# Patient Record
Sex: FEMALE | Race: BLACK OR AFRICAN AMERICAN | Employment: FULL TIME | URBAN - METROPOLITAN AREA
[De-identification: names, ages, dates, MRNs, and addresses within clinical notes are randomized per-mention and may not be internally consistent; named-entity substitution may affect disease eponyms.]

---

## 2017-01-10 ENCOUNTER — MYC MEDICAL ADVICE (OUTPATIENT)
Dept: FAMILY MEDICINE | Facility: CLINIC | Age: 37
End: 2017-01-10

## 2017-01-10 DIAGNOSIS — I26.99 OTHER ACUTE PULMONARY EMBOLISM WITHOUT ACUTE COR PULMONALE (H): Primary | ICD-10-CM

## 2017-01-10 ASSESSMENT — ENCOUNTER SYMPTOMS
COUGH DISTURBING SLEEP: 0
DYSPNEA ON EXERTION: 1
RESPIRATORY PAIN: 0
SMELL DISTURBANCE: 0
COUGH: 0
BRUISES/BLEEDS EASILY: 1
TROUBLE SWALLOWING: 0
HOARSE VOICE: 0
SORE THROAT: 0
SWOLLEN GLANDS: 0
TASTE DISTURBANCE: 0
HOT FLASHES: 0
SHORTNESS OF BREATH: 1
SINUS CONGESTION: 0
WHEEZING: 0
SPUTUM PRODUCTION: 0
NECK MASS: 0
SINUS PAIN: 0
SNORES LOUDLY: 0
POSTURAL DYSPNEA: 0
HEMOPTYSIS: 0
DECREASED LIBIDO: 0

## 2017-01-10 ASSESSMENT — ANXIETY QUESTIONNAIRES
GAD7 TOTAL SCORE: 0
7. FEELING AFRAID AS IF SOMETHING AWFUL MIGHT HAPPEN: 0 = NOT AT ALL
GAD7 TOTAL SCORE: 0

## 2017-01-10 ASSESSMENT — PATIENT HEALTH QUESTIONNAIRE - PHQ9
10. IF YOU CHECKED OFF ANY PROBLEMS, HOW DIFFICULT HAVE THESE PROBLEMS MADE IT FOR YOU TO DO YOUR WORK, TAKE CARE OF THINGS AT HOME, OR GET ALONG WITH OTHER PEOPLE: SOMEWHAT DIFFICULT
SUM OF ALL RESPONSES TO PHQ QUESTIONS 1-9: 1

## 2017-01-11 ASSESSMENT — PATIENT HEALTH QUESTIONNAIRE - PHQ9: SUM OF ALL RESPONSES TO PHQ QUESTIONS 1-9: 1

## 2017-01-11 ASSESSMENT — ANXIETY QUESTIONNAIRES: GAD7 TOTAL SCORE: 0

## 2017-01-16 ENCOUNTER — OFFICE VISIT (OUTPATIENT)
Dept: HEMATOLOGY | Facility: CLINIC | Age: 37
End: 2017-01-16
Attending: INTERNAL MEDICINE
Payer: COMMERCIAL

## 2017-01-16 ENCOUNTER — OFFICE VISIT (OUTPATIENT)
Dept: HEMATOLOGY | Facility: CLINIC | Age: 37
End: 2017-01-16
Attending: GENETIC COUNSELOR, MS
Payer: COMMERCIAL

## 2017-01-16 VITALS
WEIGHT: 154.5 LBS | TEMPERATURE: 98.3 F | BODY MASS INDEX: 28.43 KG/M2 | HEART RATE: 87 BPM | DIASTOLIC BLOOD PRESSURE: 74 MMHG | HEIGHT: 62 IN | SYSTOLIC BLOOD PRESSURE: 131 MMHG

## 2017-01-16 DIAGNOSIS — Z86.711 HISTORY OF PULMONARY EMBOLISM: Primary | ICD-10-CM

## 2017-01-16 DIAGNOSIS — I26.99 OTHER ACUTE PULMONARY EMBOLISM WITHOUT ACUTE COR PULMONALE (H): Primary | ICD-10-CM

## 2017-01-16 PROCEDURE — 99213 OFFICE O/P EST LOW 20 MIN: CPT

## 2017-01-16 PROCEDURE — 99204 OFFICE O/P NEW MOD 45 MIN: CPT | Mod: GC | Performed by: INTERNAL MEDICINE

## 2017-01-16 ASSESSMENT — PAIN SCALES - GENERAL: PAINLEVEL: NO PAIN (0)

## 2017-01-16 NOTE — PROGRESS NOTES
2017  Presenting Information: Millicent Celaya was seen for an initial evaluation at [facility]. I met with her per the request of Dr. Bhavin Keita to obtain a family history.  Personal History:   Millicent is a 36 year old woman with a history of pulmonary embolism in . She sustained a leg injury after being chased by a dog in March the same year. She was on oral contraceptives at the time of her clot, but had been on them for about 8 years. Please see Dr. Keita's note for additional details regarding her personal history.   Family History: A three generation pedigree, specific to clotting was obtained today and scanned into the EMR. The following information is significant:     One maternal half brother, age 42, no clotting.    Mother, age 65, no history of clotting. She had used birth control pills in the past.    Maternal uncle who  from cancer in his 40s.  Millicent believes this may have been pancreatic cancer, but cannot remember exactly.      No clotting in any of her three livings maternal uncles.    Maternal grandmother is 88 and has not had clotting.    Maternal grandfather  at 80 of a heart attack.    Father  at 53 from complications of juvenile diabetes.     Paternal grandfather  of a heart attack in his 80s.     Paternal grandmother  in her 70s of a stroke.     The family history is otherwise negative for clotting.    Maternal ancestry is .  Paternal ancestry is .  There is no reported consanguinity.  Plan:   1) A three generation pedigree was obtained and scanned into the EMR.  2) She will meet with Dr. Keita today  3) Contact information was provided to Millicent and additional questions or concerns were denied.    Approximate Time Spent in Consultation: 10 minutes.   CC: No Letter   Cydney Hurt MS, [facility] [place]  Certified Genetic Counselor  P. [phone]  F. [phone]

## 2017-01-16 NOTE — MR AVS SNAPSHOT
"              After Visit Summary   1/16/2017    Millicent Celaya    MRN: [id number]           Patient Information     Date Of Birth          1980        Visit Information        Provider Department      1/16/2017 1:30 PM Bhavin Keita MD [facility]        Care Instructions    1.  Stay on Xarelto 20 mg daily long-term.  2.  See Dr. Keita back in 6 months.        Follow-ups after your visit        Who to contact     If you have questions or need follow up information about today's clinic visit or your schedule please contact [facility] directly at [phone].  Normal or non-critical lab and imaging results will be communicated to you by Kliquehart, letter or phone within 4 business days after the clinic has received the results. If you do not hear from us within 7 days, please contact the clinic through [employer] or phone. If you have a critical or abnormal lab result, we will notify you by phone as soon as possible.  Submit refill requests through [employer] or call your pharmacy and they will forward the refill request to us. Please allow 3 business days for your refill to be completed.          Additional Information About Your Visit        MyChart Information     [employer] gives you secure access to your electronic health record. If you see a primary care provider, you can also send messages to your care team and make appointments. If you have questions, please call your primary care clinic.  If you do not have a primary care provider, please call [phone] and they will assist you.        Care EveryWhere ID     This is your Care EveryWhere ID. This could be used by other organizations to access your [place] medical records  [id number]        Your Vitals Were     Pulse Temperature Height BMI (Body Mass Index)          87 98.3  F (36.8  C) (Oral) 1.575 m (5' 2\") 28.25 kg/m2         Blood Pressure from Last 3 Encounters:   01/16/17 131/74   12/13/16 112/68 "   11/18/16 108/66    Weight from Last 3 Encounters:   01/16/17 70.081 kg (154 lb 8 oz)   12/13/16 69.4 kg (153 lb)   11/18/16 69.763 kg (153 lb 12.8 oz)              Today, you had the following     No orders found for display       Primary Care Provider    None Specified       No primary provider on file.        Thank you!     Thank you for choosing [facility]  for your care. Our goal is always to provide you with excellent care. Hearing back from our patients is one way we can continue to improve our services. Please take a few minutes to complete the written survey that you may receive in the mail after your visit with us. Thank you!             Your Updated Medication List - Protect others around you: Learn how to safely use, store and throw away your medicines at www.disposemymeds.org.          This list is accurate as of: 1/16/17  2:59 PM.  Always use your most recent med list.                   Brand Name Dispense Instructions for use    rivaroxaban ANTICOAGULANT 20 MG Tabs tablet    XARELTO    30 tablet    Take 1 tablet (20 mg) by mouth daily (with dinner)

## 2017-01-16 NOTE — NURSING NOTE
"Chief Complaint   Patient presents with     Consult     consult with porsha MILES cma       Initial /74 mmHg  Pulse 87  Temp(Src) 98.3  F (36.8  C) (Oral)  Ht 1.575 m (5' 2\")  Wt 70.081 kg (154 lb 8 oz)  BMI 28.25 kg/m2 Estimated body mass index is 28.25 kg/(m^2) as calculated from the following:    Height as of this encounter: 1.575 m (5' 2\").    Weight as of this encounter: 70.081 kg (154 lb 8 oz).  BP completed using cuff size: regular    "

## 2017-01-16 NOTE — PROGRESS NOTES
[facility]  Outpatient Clinic Visit      Date: 01/16/2017     Reason for Consult: Pulmonary Embolism    HISTORY OF PRESENT ILLNESS: Ms. Celaya is a 36 year old female with no significant past medical history who had an episode of VTE in July 2016.  She states that she had a tear in her right calf muscle in March of 2016. She had been undergoing physical therapy and rehab for it and it had largely resolved by June 2016. In addition, she had been on birth control pills for 8 years prior due to heavy periods from endometriosis. On 07/23/2016 she woke up around 2AM with chest pain, she went back to bed, but when she got up again at 7AM the pain continued and progressively got worse throughout the day until she called an ambulance and went to the ED. She had an elevated D-dimer and was found to have bilateral pulmonary emboli and was admitted to initiate anticoagulation. She was switched to rivaroxiban and discharged home. She does not believe she had an ultrasound of her legs at that time.  The oral estrogen was stopped.    She has been on the rivaroxiban now for about 6 months and has tolerated it fairly well with the exception of significantly heavier menstrual periods that are quite bothersome to her. They last much longer and are heavier likely due to the combination of anticoagulation and being off estrogen. She also has occasional bruises but this is nothing new for her. She otherwise has no blood in the urine, stool, nose bleeds, or other bleeding problems.    She has noted that she has continued to have shortness of breath, particularly if she climbs stairs, she has noticed this for the last several months. She has pushed her self to do more in terms of climbing more flights, but she has to take this slower than normal. She has been seeing a pulmonologist through [facility] and states she was found to have abnormal PFTs (records not available) and there are plans to work up  "further for possible pulmonary hypertension.    A complete ROS is otherwise negative.    PMH: endometriosis    PSH: salpingectomy    FMH: no family history of bleeding or clotting disorders    SocHx: Lifetime nonsmoker, rare alcohol use, no other drug use. 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      No Known Allergies    PHYSICAL EXAM:  /74 mmHg  Pulse 87  Temp(Src) 98.3  F (36.8  C) (Oral)  Ht 1.575 m (5' 2\")  Wt 70.081 kg (154 lb 8 oz)  BMI 28.25 kg/m2  Wt Readings from Last 3 Encounters:   01/16/17 70.081 kg (154 lb 8 oz)   12/13/16 69.4 kg (153 lb)   11/18/16 69.763 kg (153 lb 12.8 oz)   Gen: well appearing female in no acute distress  HEENT: anicteric sclera, oropharynx normal  Lungs: CTA B/L, no wheezing  Heart: RRR, no murmur  Abd: soft, nontender, nondistended  Ext: no lower extremity edema  Neuro: CN II-XII grossly intact      ASSESSMENT: Ms. Celaya is a 36 year old female who presents to discuss long term management of her pulmonary embolism. I reviewed with her the biology and natural history of venous thromboembolic disease. I reviewed with her the difference between provoked and unprovoked clots. Her situation is challenging; though she has potential risk factors with her musculoskeletal injury and oral estrogen, neither of these are clear reasons for her blood clot. She had the calf injury 4 months before her clot and she had been on estrogen stably for several years. Therefore, we cannot with certainty call this a provoked clot.  I did discuss that at baseline, given her history of a blood clot, she would be at increased risk of developing another blood clot compared to the general population.  The other concern is that of her pulmonary symptoms, she has only had PFTs but she states that the pulmonologist,  " Royce, plans to also do an echocardiogram at some point in the future to further work this up. Her primary complaint from the Rivaroxiban is her heavy periods.  There has been talk of possible hysterectomy or an IUD. We did review that she will need to avoid estrogen containing products in the future.     At this point, given that she does not have a clear provoking factor I would recommend that she continue on anticoagulation at this time. She understands this. I think a priority is to get her menorrhagia under control, this would certainly improve her quality of life as it is the primary side effect she is having from the anticoagulation. From our stand point and IUD such as Mirena would be appropriate. This contains progestin and should not increase her risk of developing a blood clot. In addition, she could consider endometrial ablation or hysterectomy if deemed appropriate by her gynecologist. We did indicate to her that ibuprofen intermittently should not cause significant problems and if she does develop a headache it would be okay to take this. If she has regular use she should inform us to discuss other options.    PLAN:  - Continue Rivaroxiban 20 mg daily  - She will discuss with gynecology regarding IUD vs. Endometrial ablation  - RTC in 6 months for ongoing risk/benefit assessment of anticoagulation.    Patient seen and discussed with Dr. Keita.    Emmanuel Santamaria  Hematology/Oncology Fellow        HEMATOLOGY STAFF:  Seen with fellow, whose note reflects our joint evaluation, assessment, and plan.  Healthy 37 yo woman with no previous personal or family history of VTE.  Had bilateral PE in July 2016.  Had been on oral estrogen for several years and injured her leg 4 months prior.  We cannot confidently call this a clearly provoked clot -- injury was more than 12 weeks prior, and had been on estrogen much longer than we would expect for that as a primary provoking factor.  At this point, would strongly  favor long term anticoagulation, and she is accepting of this.  Work-up for genetic (and acquired) thrombophilia likely to be unrevealing given absent FH, and results would not alter our recommendations.  Doing well on rivaroxaban, except for heavy menstrual bleeding.  Recommended that she discuss options with GYN -- Mirena IUD or endometrial ablation both appropriate options from our perspective.  RTC 6 months to re-evaluate.    Bhavin Keita MD  Associate Professor of Medicine  Division of Hematology, Oncology, and Transplantation  Director, [facility]

## 2017-01-17 NOTE — NURSING NOTE
Millicent Celaya  MRN:  [id number]  Female, 36 year old, 1980    Teaching Flowsheet   Relevant Diagnosis: Unprovoked PE 7/23/16; taking Xarelto    Teaching Topic: High risk times for venous clots, Unprovoked vs provoked clots & duration of anticoagulation, Xarelto    Plan:  Continue on Xarelto & see Dr. Neela harvey in 6 months.     Person(s) involved in teaching:   Patient     Motivation Level: good  Asks Questions: Yes      Eager to Learn: Yes  Cooperative: Yes    Receptive (willing/able to accept information): Yes     Patient demonstrates understanding of the following:  Reason for the appointment, diagnosis and treatment plan: Yes  Knowledge of proper use of medications and conditions for which they are ordered (with special attention to potential side effects or drug interactions): Yes  Which situations necessitate calling provider and whom to contact: Yes      Nutritional needs and diet plan: NA  Pain management techniques: NA  Wound Care: NA  How and/when to access community resources: NA    Educated patient about the signs, symptoms of and risk factors for venous thrombosis (VTE) and provided the National Blood Clot [place] book francisco, which reviews these facts.    Patient educated about benefits and potential complications of anticoagulation.       Patient verbalized understanding of the above information.  Reviewed and discussed the patient instructions point by point and patient verbalized understanding.They deny further questions at this time.    Copy of the After Visit Summary (AVS) given to patient for future reference. Patient given the contact card for the [facility] and has the appropriate numbers to call with any questions.    Gladis Adrian, RN, MSN -Nurse Clinician, [facility]

## 2017-02-13 ENCOUNTER — OFFICE VISIT (OUTPATIENT)
Dept: HEMATOLOGY | Facility: CLINIC | Age: 37
End: 2017-02-13
Attending: INTERNAL MEDICINE
Payer: COMMERCIAL

## 2017-02-13 VITALS
SYSTOLIC BLOOD PRESSURE: 143 MMHG | DIASTOLIC BLOOD PRESSURE: 81 MMHG | TEMPERATURE: 98 F | HEART RATE: 99 BPM | BODY MASS INDEX: 28.95 KG/M2 | WEIGHT: 157.3 LBS | HEIGHT: 62 IN

## 2017-02-13 DIAGNOSIS — Z86.711 HISTORY OF PULMONARY EMBOLISM: Primary | ICD-10-CM

## 2017-02-13 PROCEDURE — 99213 OFFICE O/P EST LOW 20 MIN: CPT | Mod: GC | Performed by: INTERNAL MEDICINE

## 2017-02-13 PROCEDURE — 99213 OFFICE O/P EST LOW 20 MIN: CPT

## 2017-02-13 ASSESSMENT — ENCOUNTER SYMPTOMS
DECREASED LIBIDO: 0
JAUNDICE: 0
DIARRHEA: 0
NAUSEA: 0
POLYDIPSIA: 0
BLOOD IN STOOL: 0
INCREASED ENERGY: 1
BRUISES/BLEEDS EASILY: 0
SWOLLEN GLANDS: 0
BOWEL INCONTINENCE: 0
CONSTIPATION: 0
WEIGHT GAIN: 0
VOMITING: 0
NIGHT SWEATS: 0
DECREASED APPETITE: 0
ALTERED TEMPERATURE REGULATION: 1
POLYPHAGIA: 0
HEARTBURN: 0
RECTAL BLEEDING: 0
FATIGUE: 1
ABDOMINAL PAIN: 0
BLOATING: 0
RECTAL PAIN: 0
HALLUCINATIONS: 0
CHILLS: 0
FEVER: 0
WEIGHT LOSS: 0
HOT FLASHES: 0

## 2017-02-13 ASSESSMENT — PAIN SCALES - GENERAL: PAINLEVEL: NO PAIN (0)

## 2017-02-13 NOTE — MR AVS SNAPSHOT
"              After Visit Summary   2/13/2017    Millicent Celaya    MRN: [id number]           Patient Information     Date Of Birth          1980        Visit Information        Provider Department      2/13/2017 1:30 PM Bhavin Keita MD [facility] Clot[facility]        Today's Diagnoses     History of pulmonary embolism    -  1       Follow-ups after your visit        Who to contact     If you have questions or need follow up information about today's clinic visit or your schedule please contact [facility] directly at [phone].  Normal or non-critical lab and imaging results will be communicated to you by Ocarina Networkshart, letter or phone within 4 business days after the clinic has received the results. If you do not hear from us within 7 days, please contact the clinic through [employer]finance [employer] or phone. If you have a critical or abnormal lab result, we will notify you by phone as soon as possible.  Submit refill requests through [employer] or call your pharmacy and they will forward the refill request to us. Please allow 3 business days for your refill to be completed.          Additional Information About Your Visit        MyChart Information     [employer] gives you secure access to your electronic health record. If you see a primary care provider, you can also send messages to your care team and make appointments. If you have questions, please call your primary care clinic.  If you do not have a primary care provider, please call [phone] and they will assist you.        Care EveryWhere ID     This is your Care EveryWhere ID. This could be used by other organizations to access your [place] medical records  [id number]        Your Vitals Were     Pulse Temperature Height BMI (Body Mass Index)          99 98  F (36.7  C) (Oral) 1.575 m (5' 2\") 28.77 kg/m2         Blood Pressure from Last 3 Encounters:   02/13/17 143/81   01/16/17 131/74   12/13/16 112/68    Weight from Last 3 Encounters: "   02/13/17 71.4 kg (157 lb 4.8 oz)   01/16/17 70.1 kg (154 lb 8 oz)   12/13/16 69.4 kg (153 lb)              Today, you had the following     No orders found for display       Primary Care Provider    None Specified       No primary provider on file.        Thank you!     Thank you for choosing [facility]  for your care. Our goal is always to provide you with excellent care. Hearing back from our patients is one way we can continue to improve our services. Please take a few minutes to complete the written survey that you may receive in the mail after your visit with us. Thank you!             Your Updated Medication List - Protect others around you: Learn how to safely use, store and throw away your medicines at www.disposemymeds.org.          This list is accurate as of: 2/13/17 11:59 PM.  Always use your most recent med list.                   Brand Name Dispense Instructions for use    rivaroxaban ANTICOAGULANT 20 MG Tabs tablet    XARELTO    30 tablet    Take 1 tablet (20 mg) by mouth daily (with dinner)

## 2017-02-13 NOTE — PROGRESS NOTES
"[facility]  Outpatient Clinic Visit    Date: 02/13/2017     Reason for Visit: Follow for DVT.    Interval History: Ms. Celaya is a 36 year old female with a history of a DVT. See note from 01/16/2017 for further details but briefly she had an injury to her calf. Since then she has had some recollections of her prior history. She says the actual date of her injury was June 23, 2016 and not in March as she had previously thought, with the subsequent diagnosis of PE in July 2016 approximately 4 weeks after (not 4 months as previously thought).    She was seen in the ED on 01/23/2016 due to abdominal pain from a known ovarian cyst. She was noted to have a hemoglobin of 6.9 with no signs of  bleeding other than menorrhagia. This was treated with a blood transfusion and Hgb responded to 8.0. She feels significantly better since. She has been off her Xarelto since that time and does not want to get back on it. While there, repeat CT imaging and and ultrasound of her legs showed resolution of clots.    She has had no other major issues and no other bleeding while she had been on the Xarelto.     Complete ROS is otherwise negative.    PHYSICAL EXAM:  /81  Pulse 99  Temp 98  F (36.7  C) (Oral)  Ht 1.575 m (5' 2\")  Wt 71.4 kg (157 lb 4.8 oz)  BMI 28.77 kg/m2  Wt Readings from Last 3 Encounters:   02/13/17 71.4 kg (157 lb 4.8 oz)   01/16/17 70.1 kg (154 lb 8 oz)   12/13/16 69.4 kg (153 lb)   Gen: well appearing female in no acute distress  HEENT: anicteric sclera, oropharynx normal  Lungs: CTA B/L, no wheezing  Heart: RRR, no murmur  Abd: soft, nontender, nondistended  Ext: no lower extremity edema  Neuro: CN II-XII grossly intact    ASSESSMENT: Ms. Celaya is a 36 year old female with a history of a DVT.  In light of the new information she provided it makes her case of a blood clot a little more convincing for a provoked cause. She was only 4 weeks out from her injury rather than 4 " months as she had originally thought. I discussed with her in detail that given this is a provoked clot we would be more comfortable with her stopping her anticoagulation. I did review that given that she has had one clot with a somewhat minor injury, she is certainly at increased risk of clotting over the general population and if she were to have surgeries, trauma, or prolonged immobility she should inform the clinic so appropriate plans can be made for adequate prophylaxis for these situations.    She is also likely iron deficient in the setting of her menorrhagia. Her MCV decreased from 90s to 70 over the last six months. She is following with her PCP for this and has elected to increase her iron intake rather than take iron supplements. She will have repeat labs at her PCP.     PLAN:  - Okay to stay off of Xarelto  - Contact the clinic in the future if she has any surgery, procedure, trauma, or prolonged immobility in order to adequately plan prophylaxis.     Patient seen and discussed with Dr. Keita.    Emmanuel Santamaria  Hematology/Oncology Fellow      HEMATOLOGY STAFF:  Seen with fellow, whose note reflects our joint evaluation, assessment, and plan.      Bhavin Keita MD  Associate Professor of Medicine  Division of Hematology, Oncology, and Transplantation  Director, [facility]

## 2017-02-13 NOTE — NURSING NOTE
"Chief Complaint   Patient presents with     RECHECK     follow up with clotting disorder, tzimmer cma       Initial /81  Pulse 99  Temp 98  F (36.7  C) (Oral)  Ht 1.575 m (5' 2\")  Wt 71.4 kg (157 lb 4.8 oz)  BMI 28.77 kg/m2 Estimated body mass index is 28.77 kg/(m^2) as calculated from the following:    Height as of this encounter: 1.575 m (5' 2\").    Weight as of this encounter: 71.4 kg (157 lb 4.8 oz).  Medication Reconciliation: complete    "

## 2017-07-14 ENCOUNTER — OFFICE VISIT (OUTPATIENT)
Dept: FAMILY MEDICINE | Facility: CLINIC | Age: 37
End: 2017-07-14
Payer: COMMERCIAL

## 2017-07-14 VITALS
WEIGHT: 149 LBS | BODY MASS INDEX: 27.42 KG/M2 | SYSTOLIC BLOOD PRESSURE: 136 MMHG | DIASTOLIC BLOOD PRESSURE: 88 MMHG | TEMPERATURE: 98.4 F | HEART RATE: 78 BPM | RESPIRATION RATE: 16 BRPM | HEIGHT: 62 IN | OXYGEN SATURATION: 100 %

## 2017-07-14 DIAGNOSIS — L70.0 CYSTIC ACNE: Primary | ICD-10-CM

## 2017-07-14 PROBLEM — D50.9 IRON DEFICIENCY ANEMIA: Status: ACTIVE | Noted: 2017-01-27

## 2017-07-14 PROCEDURE — 99214 OFFICE O/P EST MOD 30 MIN: CPT | Performed by: FAMILY MEDICINE

## 2017-07-14 RX ORDER — SPIRONOLACTONE 25 MG/1
25 TABLET ORAL DAILY
Qty: 60 TABLET | Refills: 1 | Status: SHIPPED | OUTPATIENT
Start: 2017-07-14 | End: 2017-07-14

## 2017-07-14 RX ORDER — CLINDAMYCIN PHOSPHATE, BENZOYL PEROXIDE 25; 10 MG/G; MG/G
GEL TOPICAL DAILY
Qty: 50 G | Refills: 1 | Status: SHIPPED | OUTPATIENT
Start: 2017-07-14 | End: 2017-07-14

## 2017-07-14 RX ORDER — CLINDAMYCIN PHOSPHATE, BENZOYL PEROXIDE 25; 10 MG/G; MG/G
GEL TOPICAL DAILY
Qty: 50 G | Refills: 1 | Status: SHIPPED | OUTPATIENT
Start: 2017-07-14 | End: 2017-12-12

## 2017-07-14 RX ORDER — SPIRONOLACTONE 25 MG/1
25 TABLET ORAL DAILY
Qty: 60 TABLET | Refills: 1 | Status: SHIPPED | OUTPATIENT
Start: 2017-07-14 | End: 2017-08-15

## 2017-07-14 NOTE — MR AVS SNAPSHOT
After Visit Summary   7/14/2017    Millicent Celaya    MRN: [id number]           Patient Information     Date Of Birth          1980        Visit Information        Provider Department      7/14/2017 2:20 PM Francesca Padron MD [facility]        Today's Diagnoses     Cystic acne    -  1      Care Instructions    1) Start spironolactone once a day.  2) If you don't see an improvement in 1 month, can increase to 1 in the morning and 1 at night.  3) If no improvement then, see a dermatologist!  [facility] -[phone]  Dr. Sofi Lima    4) For spot treatment, use clindagel that I sent.  5) Continue acne.org face wash regimen.  6) NO POTASSIUM SUPPLEMENTS while on spironolactone.              Follow-ups after your visit        Who to contact     If you have questions or need follow up information about today's clinic visit or your schedule please contact [facility] directly at [phone].  Normal or non-critical lab and imaging results will be communicated to you by datapinehart, letter or phone within 4 business days after the clinic has received the results. If you do not hear from us within 7 days, please contact the clinic through Discoverly[employer] or phone. If you have a critical or abnormal lab result, we will notify you by phone as soon as possible.  Submit refill requests through [employer] [facility] [employer] [facility] or call your pharmacy and they will forward the refill request to us. Please allow 3 business days for your refill to be completed.          Additional Information About Your Visit        MyChart Information     [facility] [employer] [facility] gives you secure access to your electronic health record. If you see a primary care provider, you can also send messages to your care team and make appointments. If you have questions, please call your primary care clinic.  If you do not have a primary care provider, please call  "[phone] and they will assist you.        Care EveryWhere ID     This is your Care EveryWhere ID. This could be used by other organizations to access your [place] medical records  [id number]        Your Vitals Were     Pulse Temperature Respirations Height Last Period Pulse Oximetry    78 98.4  F (36.9  C) (Tympanic) 16 5' 2\" (1.575 m) 06/16/2017 (Approximate) 100%    BMI (Body Mass Index)                   27.25 kg/m2            Blood Pressure from Last 3 Encounters:   07/14/17 136/88   02/13/17 143/81   01/16/17 131/74    Weight from Last 3 Encounters:   07/14/17 149 lb (67.6 kg)   02/13/17 157 lb 4.8 oz (71.4 kg)   01/16/17 154 lb 8 oz (70.1 kg)              Today, you had the following     No orders found for display         Today's Medication Changes          These changes are accurate as of: 7/14/17  3:08 PM.  If you have any questions, ask your nurse or doctor.               Start taking these medicines.        Dose/Directions    Clindamycin Phos-benzoyl Perox 1.2-2.5 % Gel   Used for:  Cystic acne   Started by:  Francesca Padron MD        Externally apply topically daily   Quantity:  50 g   Refills:  1       spironolactone 25 MG tablet   Commonly known as:  ALDACTONE   Used for:  Cystic acne   Started by:  Francesca Padron MD        Dose:  25 mg   Take 1 tablet (25 mg) by mouth daily .  If no better in 1 month, increase to  25 mg BID.   Quantity:  60 tablet   Refills:  1         Stop taking these medicines if you haven't already. Please contact your care team if you have questions.     rivaroxaban ANTICOAGULANT 20 MG Tabs tablet   Commonly known as:  XARELTO   Stopped by:  Francesca Padron MD                Where to get your medicines      These medications were sent to [facility] [address] [place] [address] AT [address] [place] [id number]     Phone:  [phone]     Clindamycin Phos-benzoyl Perox 1.2-2.5 % Gel    " spironolactone 25 MG tablet                Primary Care Provider    None Specified       No primary provider on file.        Equal Access to Services     PRAMOD GUEVARA : Hadii aad ku hadjasonchaitanya Nehamelany, wabhaskarda abbyemileeha, frenchta kapollysuzette morrisrosesuzette, fatimah abadadamtien farrar. So [facility] [phone].    ATENCIÓN: Si habla español, tiene a evans disposición servicios gratuitos de asistencia lingüística. Llame al [phone].    We comply with applicable federal civil rights laws and [place] laws. We do not discriminate on the basis of race, color, national origin, age, disability sex, sexual orientation or gender identity.            Thank you!     Thank you for choosing [facility]  for your care. Our goal is always to provide you with excellent care. Hearing back from our patients is one way we can continue to improve our services. Please take a few minutes to complete the written survey that you may receive in the mail after your visit with us. Thank you!             Your Updated Medication List - Protect others around you: Learn how to safely use, store and throw away your medicines at www.disposemymeds.org.          This list is accurate as of: 7/14/17  3:08 PM.  Always use your most recent med list.                   Brand Name Dispense Instructions for use Diagnosis    Clindamycin Phos-benzoyl Perox 1.2-2.5 % Gel     50 g    Externally apply topically daily    Cystic acne       spironolactone 25 MG tablet    ALDACTONE    60 tablet    Take 1 tablet (25 mg) by mouth daily .  If no better in 1 month, increase to  25 mg BID.    Cystic acne

## 2017-07-14 NOTE — PROGRESS NOTES
"  SUBJECTIVE:                                                    Millicent Celaya is a 37 year old female who presents to clinic today for the following health issues:      Cystic acne      Duration: 2 months    Description (location/character/radiation): jaw line left side    Intensity:  moderate    Accompanying signs and symptoms: painful bumps    History (similar episodes/previous evaluation): None    Precipitating or alleviating factors: None    Therapies tried and outcome: None     37 year old with history of endometriosis, anemia, and PE last year here today for acne.  Hard dark nodules.  Could barely wash face. Never has had cystic acne in past.  Gone down now, but feels like restarting with period.  Has happened monthly for the last several months.  Uses proactiv daily (or acne.com version of this).  Not on any hormones.      Has not noted irregular periods.  No abnormal or new hair growth.    Problem list and histories reviewed & adjusted, as indicated.  Additional history: as documented    Reviewed and updated as needed this visit by clinical staff  Tobacco  Allergies  Med Hx  Surg Hx  Fam Hx  Soc Hx      Reviewed and updated as needed this visit by Provider         ROS:  Constitutional, HEENT, cardiovascular, pulmonary, gi and gu systems are negative, except as otherwise noted.    OBJECTIVE:     /88  Pulse 78  Temp 98.4  F (36.9  C) (Tympanic)  Resp 16  Ht 5' 2\" (1.575 m)  Wt 149 lb (67.6 kg)  LMP 06/16/2017 (Approximate)  SpO2 100%  BMI 27.25 kg/m2  Body mass index is 27.25 kg/(m^2).  GENERAL: healthy, alert and no distress  EYES: Eyes grossly normal to inspection, PERRL and conjunctivae and sclerae normal  MS: no gross musculoskeletal defects noted, no edema  SKIN: cystic acne left chin and jaw line on right side, skin otherwise clear.  No hirsutism noted.  No acne on back or shoulders.  NEURO: Normal strength and tone, mentation intact and speech normal  PSYCH: mentation appears " normal, affect normal/bright    ASSESSMENT/PLAN:     Millicent was seen today for derm problem.    Diagnoses and all orders for this visit:    Cystic acne  -     Discontinue: spironolactone (ALDACTONE) 25 MG tablet; Take 1 tablet (25 mg) by mouth daily .  If no better in 1 month, increase to  25 mg BID.  -     Discontinue: Clindamycin Phos-benzoyl Perox 1.2-2.5 % GEL; Externally apply topically daily  -     spironolactone (ALDACTONE) 25 MG tablet; Take 1 tablet (25 mg) by mouth daily .  If no better in 1 month, increase to  25 mg BID.  -     Clindamycin Phos-benzoyl Perox 1.2-2.5 % GEL; Externally apply topically daily        Patient Instructions   1) Start spironolactone once a day.  2) If you don't see an improvement in 1 month, can increase to 1 in the morning and 1 at night.  3) If no improvement then, see a dermatologist!  [facility] -[phone]  Dr. Sofi Lima    4) For spot treatment, use clindagel that I sent.  5) Continue acne.org face wash regimen.  6) NO POTASSIUM SUPPLEMENTS while on spironolactone.        Greater than 25 minutes total were spent on this encounter, of which more than 50% was spent in direct communication with the patient including history, exam, counseling and coordination of care.       Francesca Padron MD  [facility]

## 2017-07-14 NOTE — NURSING NOTE
"Chief Complaint   Patient presents with     Derm Problem       Initial /88  Pulse 78  Temp 98.4  F (36.9  C) (Tympanic)  Resp 16  Ht 5' 2\" (1.575 m)  Wt 149 lb (67.6 kg)  LMP 06/16/2017 (Approximate)  SpO2 100%  BMI 27.25 kg/m2 Estimated body mass index is 27.25 kg/(m^2) as calculated from the following:    Height as of this encounter: 5' 2\" (1.575 m).    Weight as of this encounter: 149 lb (67.6 kg).  Medication Reconciliation: complete   .Joie NUGENT      "

## 2017-07-14 NOTE — PATIENT INSTRUCTIONS
1) Start spironolactone once a day.  2) If you don't see an improvement in 1 month, can increase to 1 in the morning and 1 at night.  3) If no improvement then, see a dermatologist!  [facility] -[phone]  Dr. Sofi Lima    4) For spot treatment, use clindagel that I sent.  5) Continue acne.org face wash regimen.  6) NO POTASSIUM SUPPLEMENTS while on spironolactone.

## 2017-08-02 ENCOUNTER — MYC MEDICAL ADVICE (OUTPATIENT)
Dept: FAMILY MEDICINE | Facility: CLINIC | Age: 37
End: 2017-08-02

## 2017-08-02 ENCOUNTER — TELEPHONE (OUTPATIENT)
Dept: FAMILY MEDICINE | Facility: CLINIC | Age: 37
End: 2017-08-02

## 2017-08-02 NOTE — TELEPHONE ENCOUNTER
I called [facility] and unfortunately there are no formulary alternatives for this medication. Please advise.

## 2017-08-02 NOTE — TELEPHONE ENCOUNTER
Prior Authorization Request    Prior Authorization for the medication Acanya 1.2-2.5% gel  1.         Requesting Provider: No primary care provider on file.          Pt name: Millicent Celaya        Pt : 1980        Pt MRN: [id number]        Last Office Visit: Visit date not found           Insurance: Payor: BCBS / Plan: BCBS OUT OF STATE / Product Type: Indemnity /         Insurance ID Number: [id number]        Prior Auth Contact Phone number: [phone]     BIN#:   PCN#:     PA started on CMM       To be completed by provider:     2.   Refuse or Start Prior Auth:  Do not start Prior Auth, medication change required.  Call patient and have them ask their insurance company for a list of replacements on formulary.

## 2017-08-02 NOTE — TELEPHONE ENCOUNTER
Millicent has a few options:  --Pay OOP for this.  --Go see a derm and see if they can get her free samples.  Could you let her know?  I'm sorry that they won't cover this for her.  Dr. Francesca Padron MD/[facility]

## 2017-08-02 NOTE — TELEPHONE ENCOUNTER
Called patient, could not leave VM as there is no room to leave one. I sent the patient a [employer] message instead.

## 2017-08-15 ENCOUNTER — MYC MEDICAL ADVICE (OUTPATIENT)
Dept: FAMILY MEDICINE | Facility: CLINIC | Age: 37
End: 2017-08-15

## 2017-08-15 DIAGNOSIS — L70.0 CYSTIC ACNE: ICD-10-CM

## 2017-08-15 RX ORDER — SPIRONOLACTONE 25 MG/1
25 TABLET ORAL DAILY
Qty: 60 TABLET | Refills: 1 | Status: SHIPPED | OUTPATIENT
Start: 2017-08-15 | End: 2017-10-16

## 2017-08-15 NOTE — TELEPHONE ENCOUNTER
Spironolactone      Last Written Prescription Date: 7-14-17  Last Fill Quantity: 60, # refills: 0  [place] [facility] or [facility] prescribing provider: 7-14-17       No results found for: POTASSIUM  No results found for: CR  BP Readings from Last 3 Encounters:   07/14/17 136/88   02/13/17 143/81   01/16/17 131/74

## 2017-10-15 ENCOUNTER — MYC MEDICAL ADVICE (OUTPATIENT)
Dept: FAMILY MEDICINE | Facility: CLINIC | Age: 37
End: 2017-10-15

## 2017-10-15 ENCOUNTER — MYC REFILL (OUTPATIENT)
Dept: FAMILY MEDICINE | Facility: CLINIC | Age: 37
End: 2017-10-15

## 2017-10-15 DIAGNOSIS — L70.0 CYSTIC ACNE: ICD-10-CM

## 2017-10-15 RX ORDER — SPIRONOLACTONE 25 MG/1
25 TABLET ORAL DAILY
Qty: 60 TABLET | Refills: 1 | Status: CANCELLED | OUTPATIENT
Start: 2017-10-15

## 2017-10-16 RX ORDER — SPIRONOLACTONE 25 MG/1
TABLET ORAL
Qty: 60 TABLET | Refills: 5 | Status: CANCELLED | OUTPATIENT
Start: 2017-10-16

## 2017-10-16 NOTE — TELEPHONE ENCOUNTER
Please advice on refill for aldactone.  Does she need lab appointment for creatinine or potassium check or would provider like follow up OV?

## 2017-10-16 NOTE — TELEPHONE ENCOUNTER
spironolactone (ALDACTONE) 25 MG tablet     Last Written Prescription Date: 8/15/17  Last Fill Quantity: 60, # refills: 1  Last Office Visit with [facility], [facility] or [facility] prescribing provider: 7/14/17       No results found for: POTASSIUM  No results found for: CR  BP Readings from Last 3 Encounters:   07/14/17 136/88   02/13/17 143/81   01/16/17 131/74     Prescription approved per [facility] Refill Protocol for 12 months of refills since last appointment, which was 7/14/17, however, directions need to be clarified with the patient. Is she taking it QD? Or BID?

## 2017-10-17 RX ORDER — SPIRONOLACTONE 25 MG/1
25 TABLET ORAL 2 TIMES DAILY
Qty: 180 TABLET | Refills: 1 | Status: SHIPPED | OUTPATIENT
Start: 2017-10-17 | End: 2018-04-21

## 2017-10-17 NOTE — TELEPHONE ENCOUNTER
Lab only works great.  I placed order.  Thanks,  Dr. Francesca Padron MD/[facility]

## 2017-10-17 NOTE — TELEPHONE ENCOUNTER
I already did this fill - lab only, please.  Thanks!  Dr. Francesca Padron MD/[facility]

## 2017-11-07 DIAGNOSIS — L70.0 CYSTIC ACNE: ICD-10-CM

## 2017-11-07 PROCEDURE — 80048 BASIC METABOLIC PNL TOTAL CA: CPT | Performed by: FAMILY MEDICINE

## 2017-11-07 PROCEDURE — 36415 COLL VENOUS BLD VENIPUNCTURE: CPT | Performed by: FAMILY MEDICINE

## 2017-11-08 LAB
ANION GAP SERPL CALCULATED.3IONS-SCNC: 8 MMOL/L (ref 3–14)
BUN SERPL-MCNC: 14 MG/DL (ref 7–30)
CALCIUM SERPL-MCNC: 8.9 MG/DL (ref 8.5–10.1)
CHLORIDE SERPL-SCNC: 104 MMOL/L (ref 94–109)
CO2 SERPL-SCNC: 25 MMOL/L (ref 20–32)
CREAT SERPL-MCNC: 0.63 MG/DL (ref 0.52–1.04)
GFR SERPL CREATININE-BSD FRML MDRD: >90 ML/MIN/1.7M2
GLUCOSE SERPL-MCNC: 90 MG/DL (ref 70–99)
POTASSIUM SERPL-SCNC: 4 MMOL/L (ref 3.4–5.3)
SODIUM SERPL-SCNC: 137 MMOL/L (ref 133–144)

## 2017-11-08 NOTE — PROGRESS NOTES
Good news, Millicent!   Your results are normal.  Let me know if you have any questions.  Dr. Francesca Padron MD  [facility]  [phone]

## 2017-12-12 ENCOUNTER — TELEPHONE (OUTPATIENT)
Dept: FAMILY MEDICINE | Facility: CLINIC | Age: 37
End: 2017-12-12

## 2017-12-12 ENCOUNTER — OFFICE VISIT (OUTPATIENT)
Dept: FAMILY MEDICINE | Facility: CLINIC | Age: 37
End: 2017-12-12
Payer: COMMERCIAL

## 2017-12-12 VITALS
OXYGEN SATURATION: 99 % | WEIGHT: 151 LBS | BODY MASS INDEX: 27.79 KG/M2 | HEART RATE: 79 BPM | DIASTOLIC BLOOD PRESSURE: 74 MMHG | RESPIRATION RATE: 16 BRPM | HEIGHT: 62 IN | SYSTOLIC BLOOD PRESSURE: 110 MMHG

## 2017-12-12 DIAGNOSIS — Z00.00 ROUTINE GENERAL MEDICAL EXAMINATION AT A HEALTH CARE FACILITY: Primary | ICD-10-CM

## 2017-12-12 PROCEDURE — 99395 PREV VISIT EST AGE 18-39: CPT | Mod: 25 | Performed by: FAMILY MEDICINE

## 2017-12-12 PROCEDURE — 90471 IMMUNIZATION ADMIN: CPT | Performed by: FAMILY MEDICINE

## 2017-12-12 PROCEDURE — 90715 TDAP VACCINE 7 YRS/> IM: CPT | Performed by: FAMILY MEDICINE

## 2017-12-12 PROCEDURE — 80061 LIPID PANEL: CPT | Performed by: FAMILY MEDICINE

## 2017-12-12 PROCEDURE — 36415 COLL VENOUS BLD VENIPUNCTURE: CPT | Performed by: FAMILY MEDICINE

## 2017-12-12 PROCEDURE — 82947 ASSAY GLUCOSE BLOOD QUANT: CPT | Performed by: FAMILY MEDICINE

## 2017-12-12 NOTE — TELEPHONE ENCOUNTER
Reason for Call:  Form, our goal is to have forms completed with 72 hours, however, some forms may require a visit or additional information.    Type of letter, form or note:  employer    Who is the form from?: [employer]  (if other please explain)    Where did the form come from: Patient or family brought in       What clinic location was the form placed at?: [facility]    Where the form was placed: on Rula's desk    What number is listed as a contact on the form?: fax [phone]       Additional comments: physician screening form [facility]    Call taken on 12/12/2017 at 2:06 PM by Rula Busby

## 2017-12-12 NOTE — NURSING NOTE
"Chief Complaint   Patient presents with     Physical       Initial /74  Pulse 79  Resp 16  Ht 5' 2\" (1.575 m)  Wt 151 lb (68.5 kg)  SpO2 99%  BMI 27.62 kg/m2 Estimated body mass index is 27.62 kg/(m^2) as calculated from the following:    Height as of this encounter: 5' 2\" (1.575 m).    Weight as of this encounter: 151 lb (68.5 kg).  Medication Reconciliation: allie Busby CMA      "

## 2017-12-12 NOTE — MR AVS SNAPSHOT
After Visit Summary   12/12/2017    Millicent Celaya    MRN: [id number]           Patient Information     Date Of Birth          1980        Visit Information        Provider Department      12/12/2017 1:20 PM Francesca Padron MD [facility]        Today's Diagnoses     Routine general medical examination at a health care facility    -  1      Care Instructions      Preventive Health Recommendations  Female Ages 26 - 39  Yearly exam:   See your health care provider every year in order to    Review health changes.     Discuss preventive care.      Review your medicines if you your doctor has prescribed any.    Until age 30: Get a Pap test every three years (more often if you have had an abnormal result).    After age 30: Talk to your doctor about whether you should have a Pap test every 3 years or have a Pap test with HPV screening every 5 years.   You do not need a Pap test if your uterus was removed (hysterectomy) and you have not had cancer.  You should be tested each year for STDs (sexually transmitted diseases), if you're at risk.   Talk to your provider about how often to have your cholesterol checked.  If you are at risk for diabetes, you should have a diabetes test (fasting glucose).  Shots: Get a flu shot each year. Get a tetanus shot every 10 years.   Nutrition:     Eat at least 5 servings of fruits and vegetables each day.    Eat whole-grain bread, whole-wheat pasta and brown rice instead of white grains and rice.    Talk to your provider about Calcium and Vitamin D.     Lifestyle    Exercise at least 150 minutes a week (30 minutes a day, 5 days of the week). This will help you control your weight and prevent disease.    Limit alcohol to one drink per day.    No smoking.     Wear sunscreen to prevent skin cancer.    See your dentist every six months for an exam and cleaning.            Follow-ups after your visit        Who to contact     If you have  "questions or need follow up information about today's clinic visit or your schedule please contact [facility] directly at [phone].  Normal or non-critical lab and imaging results will be communicated to you by MyChart, letter or phone within 4 business days after the clinic has received the results. If you do not hear from us within 7 days, please contact the clinic through [employer] or phone. If you have a critical or abnormal lab result, we will notify you by phone as soon as possible.  Submit refill requests through [employer] [facility] or call your pharmacy and they will forward the refill request to us. Please allow 3 business days for your refill to be completed.          Additional Information About Your Visit        [employer] Information     [facility] [employer] [facility] gives you secure access to your electronic health record. If you see a primary care provider, you can also send messages to your care team and make appointments. If you have questions, please call your primary care clinic.  If you do not have a primary care provider, please call [phone] and they will assist you.        Care EveryWhere ID     This is your Care EveryWhere ID. This could be used by other organizations to access your [place] medical records  [id number]        Your Vitals Were     Pulse Respirations Height Pulse Oximetry BMI (Body Mass Index)       79 16 5' 2\" (1.575 m) 99% 27.62 kg/m2        Blood Pressure from Last 3 Encounters:   12/12/17 110/74   07/14/17 136/88   02/13/17 143/81    Weight from Last 3 Encounters:   12/12/17 151 lb (68.5 kg)   07/14/17 149 lb (67.6 kg)   02/13/17 157 lb 4.8 oz (71.4 kg)              We Performed the Following     Glucose     Lipid panel reflex to direct LDL Fasting     TDAP VACCINE (ADACEL)          Today's Medication Changes          These changes are accurate as of: 12/12/17  2:14 PM.  If you have any questions, ask your nurse or doctor.               Stop taking these medicines if " you haven't already. Please contact your care team if you have questions.     Clindamycin Phos-benzoyl Perox 1.2-2.5 % Gel   Stopped by:  Francesca Padron MD                    Primary Care Provider Office Phone # Fax #    Francesca Padron [phone] [address] [facility] [address]        Equal Access to Services     [facility]: Hadii aad ku hadasho Soomaali, waaxda luqadaha, qaybta kaalmada adeegyada, waxay idiin hayaan adeeg kharash la'aan ah. So [facility] [phone].    ATENCIÓN: Si habla español, tiene a evans disposición servicios gratuitos de asistencia lingüística. Kaylah al [phone].    We comply with applicable federal civil rights laws and [place] laws. We do not discriminate on the basis of race, color, national origin, age, disability, sex, sexual orientation, or gender identity.            Thank you!     Thank you for choosing [facility]  for your care. Our goal is always to provide you with excellent care. Hearing back from our patients is one way we can continue to improve our services. Please take a few minutes to complete the written survey that you may receive in the mail after your visit with us. Thank you!             Your Updated Medication List - Protect others around you: Learn how to safely use, store and throw away your medicines at www.disposemymeds.org.          This list is accurate as of: 12/12/17  2:14 PM.  Always use your most recent med list.                   Brand Name Dispense Instructions for use Diagnosis    spironolactone 25 MG tablet    ALDACTONE    180 tablet    Take 1 tablet (25 mg) by mouth 2 times daily    Cystic acne

## 2017-12-12 NOTE — PROGRESS NOTES
SUBJECTIVE:   CC: Millicent Celaya is an 37 year old woman who presents for preventive health visit.     Physical   Annual:     Getting at least 3 servings of Calcium per day::  Yes    Bi-annual eye exam::  Yes    Dental care twice a year::  NO    Sleep apnea or symptoms of sleep apnea::  None    Diet::  Regular (no restrictions)    Frequency of exercise::  None    Taking medications regularly::  Yes    Medication side effects::  None    Additional concerns today::  No      Today's PHQ-2 Score:   PHQ-2 ( 1999 Pf[employer]) 12/12/2017   Q1: Little interest or pleasure in doing things 0   Q2: Feeling down, depressed or hopeless 0   PHQ-2 Score 0   Q1: Little interest or pleasure in doing things Not at all   Q2: Feeling down, depressed or hopeless Not at all   PHQ-2 Score 0       Abuse: Current or Past(Physical, Sexual or Emotional)- No  Do you feel safe in your environment - Yes    Social History   Substance Use Topics     Smoking status: Never Smoker     Smokeless tobacco: Never Used     Alcohol use Yes     The patient does not drink >3 drinks per day nor >7 drinks per week.    Reviewed orders with patient.  Reviewed health maintenance and updated orders accordingly - Yes  BP Readings from Last 3 Encounters:   12/12/17 110/74   07/14/17 136/88   02/13/17 143/81    Wt Readings from Last 3 Encounters:   12/12/17 151 lb (68.5 kg)   07/14/17 149 lb (67.6 kg)   02/13/17 157 lb 4.8 oz (71.4 kg)             Mammogram not appropriate for this patient based on age.    Pertinent mammograms are reviewed under the imaging tab.  History of abnormal Pap smear: NO - age 30-65 PAP every 5 years with negative HPV co-testing recommended    Reviewed and updated as needed this visit by clinical staff  Tobacco  Allergies  Meds  Med Hx  Surg Hx  Fam Hx  Soc Hx        Reviewed and updated as needed this visit by Provider          Review of Systems  C: NEGATIVE for fever, chills, change in weight  I: NEGATIVE for worrisome rashes, moles  "or lesions  E: NEGATIVE for vision changes or irritation  ENT: NEGATIVE for ear, mouth and throat problems  R: NEGATIVE for significant cough or SOB  B: NEGATIVE for masses, tenderness or discharge  CV: NEGATIVE for chest pain, palpitations or peripheral edema  GI: NEGATIVE for nausea, abdominal pain, heartburn, or change in bowel habits  : NEGATIVE for unusual urinary or vaginal symptoms. Periods are regular.  M: NEGATIVE for significant arthralgias or myalgia  N: NEGATIVE for weakness, dizziness or paresthesias  P: NEGATIVE for changes in mood or affect     OBJECTIVE:   /74  Pulse 79  Resp 16  Ht 5' 2\" (1.575 m)  Wt 151 lb (68.5 kg)  SpO2 99%  BMI 27.62 kg/m2  Physical Exam  GENERAL: healthy, alert and no distress  EYES: Eyes grossly normal to inspection, PERRL and conjunctivae and sclerae normal  HENT: ear canals and TM's normal, nose and mouth without ulcers or lesions  NECK: no adenopathy, no asymmetry, masses, or scars and thyroid normal to palpation  RESP: lungs clear to auscultation - no rales, rhonchi or wheezes  CV: regular rate and rhythm, normal S1 S2, no S3 or S4, no murmur, click or rub, no peripheral edema and peripheral pulses strong  ABDOMEN: soft, nontender, no hepatosplenomegaly, no masses and bowel sounds normal  MS: no gross musculoskeletal defects noted, no edema  SKIN: no suspicious lesions or rashes  NEURO: Normal strength and tone, mentation intact and speech normal  PSYCH: mentation appears normal, affect normal/bright    ASSESSMENT/PLAN:       ICD-10-CM    1. Routine general medical examination at a health care facility Z00.00 Lipid panel reflex to direct LDL Fasting     Glucose     TDAP VACCINE (ADACEL)       COUNSELING:  Reviewed preventive health counseling, as reflected in patient instructions         reports that she has never smoked. She has never used smokeless tobacco.    Estimated body mass index is 27.62 kg/(m^2) as calculated from the following:    Height as of " "this encounter: 5' 2\" (1.575 m).    Weight as of this encounter: 151 lb (68.5 kg).   Weight management plan: Discussed healthy diet and exercise guidelines and patient will follow up in 12 months in clinic to re-evaluate.    Counseling Resources:  ATP IV Guidelines  Pooled Cohorts Equation Calculator  Breast Cancer Risk Calculator  FRAX Risk Assessment  ICSI Preventive Guidelines  Dietary Guidelines for Americans, 2010  USDA's MyPlate  ASA Prophylaxis  Lung CA Screening    Francesca Padron MD  [facility]  "

## 2017-12-13 LAB
CHOLEST SERPL-MCNC: 135 MG/DL
GLUCOSE SERPL-MCNC: 82 MG/DL (ref 70–99)
HDLC SERPL-MCNC: 72 MG/DL
LDLC SERPL CALC-MCNC: 57 MG/DL
NONHDLC SERPL-MCNC: 63 MG/DL
TRIGL SERPL-MCNC: 31 MG/DL

## 2017-12-13 NOTE — TELEPHONE ENCOUNTER
Form faxed to [phone] and email to pt at pt request to [email or web address].  Put in provider fax folder.

## 2017-12-13 NOTE — PROGRESS NOTES
Good news, Millicent!   Your results are normal.  Let me know if you have any questions.  Dr. Francesca Padron MD  [facility]  [phone]

## 2018-04-22 ENCOUNTER — MYC MEDICAL ADVICE (OUTPATIENT)
Dept: FAMILY MEDICINE | Facility: CLINIC | Age: 38
End: 2018-04-22

## 2018-07-25 ENCOUNTER — OFFICE VISIT (OUTPATIENT)
Dept: FAMILY MEDICINE | Facility: CLINIC | Age: 38
End: 2018-07-25
Payer: COMMERCIAL

## 2018-07-25 VITALS
SYSTOLIC BLOOD PRESSURE: 112 MMHG | DIASTOLIC BLOOD PRESSURE: 70 MMHG | WEIGHT: 164 LBS | HEART RATE: 69 BPM | TEMPERATURE: 97.7 F | OXYGEN SATURATION: 100 % | BODY MASS INDEX: 30 KG/M2

## 2018-07-25 DIAGNOSIS — I26.99 OTHER ACUTE PULMONARY EMBOLISM WITHOUT ACUTE COR PULMONALE (H): ICD-10-CM

## 2018-07-25 DIAGNOSIS — N92.4 EXCESSIVE BLEEDING IN PREMENOPAUSAL PERIOD: Primary | ICD-10-CM

## 2018-07-25 DIAGNOSIS — D50.0 IRON DEFICIENCY ANEMIA DUE TO CHRONIC BLOOD LOSS: ICD-10-CM

## 2018-07-25 DIAGNOSIS — I82.4Y9 DEEP VEIN THROMBOSIS (DVT) OF PROXIMAL LOWER EXTREMITY, UNSPECIFIED CHRONICITY, UNSPECIFIED LATERALITY (H): ICD-10-CM

## 2018-07-25 LAB
ERYTHROCYTE [DISTWIDTH] IN BLOOD BY AUTOMATED COUNT: 14.1 % (ref 10–15)
HCT VFR BLD AUTO: 34.5 % (ref 35–47)
HGB BLD-MCNC: 11 G/DL (ref 11.7–15.7)
MCH RBC QN AUTO: 28.6 PG (ref 26.5–33)
MCHC RBC AUTO-ENTMCNC: 31.9 G/DL (ref 31.5–36.5)
MCV RBC AUTO: 90 FL (ref 78–100)
PLATELET # BLD AUTO: 234 10E9/L (ref 150–450)
RBC # BLD AUTO: 3.84 10E12/L (ref 3.8–5.2)
WBC # BLD AUTO: 7.5 10E9/L (ref 4–11)

## 2018-07-25 PROCEDURE — 82728 ASSAY OF FERRITIN: CPT | Performed by: FAMILY MEDICINE

## 2018-07-25 PROCEDURE — 36415 COLL VENOUS BLD VENIPUNCTURE: CPT | Performed by: FAMILY MEDICINE

## 2018-07-25 PROCEDURE — 85027 COMPLETE CBC AUTOMATED: CPT | Performed by: FAMILY MEDICINE

## 2018-07-25 PROCEDURE — 99214 OFFICE O/P EST MOD 30 MIN: CPT | Performed by: FAMILY MEDICINE

## 2018-07-25 NOTE — MR AVS SNAPSHOT
After Visit Summary   7/25/2018    Millicent Celaya    MRN: [id number]           Patient Information     Date Of Birth          1980        Visit Information        Provider Department      7/25/2018 4:20 PM Francesca Padron MD [facility]        Care Instructions    1. Your periods are unsustainable!  I agree with you.  We can help make this better.    2. I'd like you to see Dr. Purnima Chapman.  She can talk to you about your options including the pill versus an ablation procedure (safer!).    [facility]:  [phone].    3. I'll send your question about restarting the pill to Dr. Chapman and your hematologist.  I'll let you know if they give you the okay and send a prescription.            Follow-ups after your visit        Who to contact     If you have questions or need follow up information about today's clinic visit or your schedule please contact [facility] directly at [phone].  Normal or non-critical lab and imaging results will be communicated to you by MyChart, letter or phone within 4 business days after the clinic has received the results. If you do not hear from us within 7 days, please contact the clinic through HAULhart or phone. If you have a critical or abnormal lab result, we will notify you by phone as soon as possible.  Submit refill requests through Mamaherb or call your pharmacy and they will forward the refill request to us. Please allow 3 business days for your refill to be completed.          Additional Information About Your Visit        HAULhart Information     Mamaherb gives you secure access to your electronic health record. If you see a primary care provider, you can also send messages to your care team and make appointments. If you have questions, please call your primary care clinic.  If you do not have a primary care provider, please call [phone] and they will  assist you.        Care EveryWhere ID     This is your Care EveryWhere ID. This could be used by other organizations to access your [facility] medical records  [id number]        Your Vitals Were     Pulse Temperature Last Period Pulse Oximetry BMI (Body Mass Index)       69 97.7  F (36.5  C) (Tympanic) 07/21/2018 (Approximate) 100% 30 kg/m2        Blood Pressure from Last 3 Encounters:   07/25/18 112/70   12/12/17 110/74   07/14/17 136/88    Weight from Last 3 Encounters:   07/25/18 164 lb (74.4 kg)   12/12/17 151 lb (68.5 kg)   07/14/17 149 lb (67.6 kg)              Today, you had the following     No orders found for display       Primary Care Provider Office Phone # Fax #    Francesca Padron [phone] [address] [facility] [address]        Equal Access to Services     PRAMOD GUEVARA : Hadii lynn schneider hadasho Soomaali, waaxda luqadaha, qaybta kaalmada adeegyada, waxay pagein francheska reynolds . So [facility] [phone].    ATENCIÓN: Si habla español, tiene a evans disposición servicios gratuitos de asistencia lingüística. Llame al [phone].    We comply with applicable federal civil rights laws and [place] laws. We do not discriminate on the basis of race, color, national origin, age, disability, sex, sexual orientation, or gender identity.            Thank you!     Thank you for choosing [facility]  for your care. Our goal is always to provide you with excellent care. Hearing back from our patients is one way we can continue to improve our services. Please take a few minutes to complete the written survey that you may receive in the mail after your visit with us. Thank you!             Your Updated Medication List - Protect others around you: Learn how to safely use, store and throw away your medicines at www.disposemymeds.org.          This list is accurate as of 7/25/18  4:33 PM.  Always use your most recent med list.                   Brand  Name Dispense Instructions for use Diagnosis    spironolactone 25 MG tablet    ALDACTONE    180 tablet    TAKE 1 TABLET(25 MG) BY MOUTH TWICE DAILY    Cystic acne

## 2018-07-25 NOTE — PATIENT INSTRUCTIONS
1. Your periods are unsustainable!  I agree with you.  We can help make this better.    2. I'd like you to see Dr. Purnima Chapman.  She can talk to you about your options including the pill versus an ablation procedure (safer!).    [facility]:  [phone].    3. I'll send your question about restarting the pill to Dr. Chapman and your hematologist.  I'll let you know if they give you the okay to send a prescription for oral contraceptive pills (unlikely).

## 2018-07-25 NOTE — Clinical Note
Andrea Delcid and Katie: This patient will be establishing care with you Katie, and saw you last year Bhavin.   Quick recap - ganga 38 year old with provoked bilateral PE after calf injury and immobilization while on oral contraceptive pills.  She also has OPAL and menorrhagia.  She saw me today asking to go back on combined OCPs - I told her I didn't feel comfortable given her history of PE.   Quick question for you Bhavin: I also told her I'd run her case by you Bhavin as you saw her, in case given that her PEs were provoked you thought it would be okay to restart her oral contraceptive pills (I told her unlikely you would say that).  Can you just give me a quick yes or no to this? And for you Katie: I'm sending her to you see what your thoughts for her menorrhagia are (she doesn't want IUD or depo), and if she'd be a good candidate for ablation.  Thanks for you help!    Francesca Sylvester Dr., MD/[facility]

## 2018-07-25 NOTE — PROGRESS NOTES
"  SUBJECTIVE:   Millicent Celaya is a 38 year old female who presents to clinic today for the following health issues:      Pt here requesting birth control wants to discuss.    38-year-old female with history of menorrhagia and DVT and pulmonary embolus.  She had suspected provoked bilateral PE in July 2016 after calf injury.  She had been on the birth control pill for 8 years at that time and so after finding PE this was discontinued.  Now she wants to get back on the pill.  Essentially at this point she feels it is not worth it to have such heavy bleeding every month, and she liked the way the pill made her feel and regulated her cycles.  She is aware that she has not would have an increased risk of blood clots with the birth control pill but she says \"I am willing to risk it at this point because my periods have been so helacious\".    Doesn't want to do hysterectomy.  Does not want to do IUD.  Doesn't like the idea of something inside her.  Life was great on the pill; regulated it.  Lasted no more than 4 days.  Clotting insane with period now.  Has to change pad 9+ times a day; and OPAL with menorrhagia despite iron rich diet.      Problem list and histories reviewed & adjusted, as indicated.  Additional history: as documented    Reviewed and updated as needed this visit by clinical staff  Tobacco  Allergies  Meds  Med Hx  Surg Hx  Fam Hx  Soc Hx      Reviewed and updated as needed this visit by Provider         ROS:  Constitutional, HEENT, cardiovascular, pulmonary, gi and gu systems are negative, except as otherwise noted.    OBJECTIVE:     /70 (Cuff Size: Adult Large)  Pulse 69  Temp 97.7  F (36.5  C) (Tympanic)  Wt 164 lb (74.4 kg)  LMP 07/21/2018 (Approximate)  SpO2 100%  BMI 30 kg/m2  Body mass index is 30 kg/(m^2).  GENERAL: healthy, alert and no distress  MS: no gross musculoskeletal defects noted, no edema  SKIN: no suspicious lesions or rashes  PSYCH: mentation appears normal, affect " normal/bright, judgement and insight intact, appearance well groomed and tearful when we discuss her periods    ASSESSMENT/PLAN:       ICD-10-CM    1. Excessive bleeding in premenopausal period N92.4 CBC with platelets     Ferritin   2. Other acute pulmonary embolism without acute cor pulmonale (H) I26.99    3. Deep vein thrombosis (DVT) of proximal lower extremity, unspecified chronicity, unspecified laterality (H) I82.4Y9    4. Iron deficiency anemia due to chronic blood loss D50.0      38-year-old with history of provoked bilateral  pulmonary embolus and menorrhagia with associated iron deficiency anemia here today in the hopes that I will put her back on the birth control pill despite her increased risks of blood clots.  After long and somewhat difficult discussion she was in agreement that this is not the best way to go forward at this point.  We agreed that instead I would double check with her hematologist about the risk profile of restarting OCPs (I don't think this is an option for her), and that she should see an gynecologist to discuss other options for managing her heavy menstrual flow.  As she does not plan on pregnancy she might be a great candidate for an ablation which would be much safer for her overall.   She is not interested in IUD or Depo shots so these are not an option.    Discussed with patient, all questions answered, in agreement with this plan, will return or seek further care if not improving or worsening.    Patient Instructions   1. Your periods are unsustainable!  I agree with you.  We can help make this better.    2. I'd like you to see Dr. Purnima Chapman.  She can talk to you about your options including the pill versus an ablation procedure (safer!).    [facility]:  [phone].    3. I'll send your question about restarting the pill to Dr. Chapman and your hematologist.  I'll let you know if they give you the okay and send a prescription.        Francesca  Gianna Padron MD  [facility]

## 2018-07-26 LAB — FERRITIN SERPL-MCNC: 8 NG/ML (ref 12–150)

## 2018-07-27 NOTE — PROGRESS NOTES
Barrett Ricks:  Your ferritin and hemoglobin are low!  You do need additional iron - I know you don't like the supplements but have you tried taking a Marks's vitamin with iron daily?  Or a prenatal with iron daily?  These are often much easier on your stomach and could help bring your iron stores up to normal.    Also, I heard back from your hematologist Dr. Keita and Dr. Chapman (the gynecologist I recommended for you).  Unfortunately they both agree that restarting birth control pills would be too dangerous for you.  But Dr. Chapman is looking forward to meeting you and discussing options to help control your bleeding.    Let me know if you have any questions about this.  Best,  Dr. Francesca Padron MD  [facility]  [phone]

## 2018-09-21 ENCOUNTER — OFFICE VISIT (OUTPATIENT)
Dept: OBGYN | Facility: CLINIC | Age: 38
End: 2018-09-21
Payer: COMMERCIAL

## 2018-09-21 VITALS
DIASTOLIC BLOOD PRESSURE: 79 MMHG | BODY MASS INDEX: 29.26 KG/M2 | SYSTOLIC BLOOD PRESSURE: 114 MMHG | OXYGEN SATURATION: 100 % | WEIGHT: 160 LBS | HEART RATE: 66 BPM

## 2018-09-21 DIAGNOSIS — N92.4 EXCESSIVE BLEEDING IN PREMENOPAUSAL PERIOD: Primary | ICD-10-CM

## 2018-09-21 PROCEDURE — 99243 OFF/OP CNSLTJ NEW/EST LOW 30: CPT | Performed by: OBSTETRICS & GYNECOLOGY

## 2018-09-21 NOTE — PATIENT INSTRUCTIONS
Call [phone] to schedule ultrasound for check uterus for polyps or fibroids.  Listen to prompts for the surgery and ultrasound .    mirena IUD--check the web site    [place] single mothers by choice

## 2018-09-21 NOTE — MR AVS SNAPSHOT
After Visit Summary   9/21/2018    Millicent Celaya    MRN: [id number]           Patient Information     Date Of Birth          1980        Visit Information        Provider Department      9/21/2018 9:30 AM Purnima Chapman MD [facility]        Today's Diagnoses     Excessive bleeding in premenopausal period    -  1      Care Instructions    Call [phone] to schedule ultrasound for check uterus for polyps or fibroids.  Listen to prompts for the surgery and ultrasound .    mirena IUD--check the web site    [place] single mothers by choice          Follow-ups after your visit        Future tests that were ordered for you today     Open Future Orders        Priority Expected Expires Ordered    US Pelvic Complete with Transvaginal Routine 12/20/2018 3/20/2019 9/21/2018            Who to contact     If you have questions or need follow up information about today's clinic visit or your schedule please contact [facility] directly at [phone].  Normal or non-critical lab and imaging results will be communicated to you by MySQLhart, letter or phone within 4 business days after the clinic has received the results. If you do not hear from us within 7 days, please contact the clinic through [employer] or phone. If you have a critical or abnormal lab result, we will notify you by phone as soon as possible.  Submit refill requests through [employer] or call your pharmacy and they will forward the refill request to us. Please allow 3 business days for your refill to be completed.          Additional Information About Your Visit        MyChart Information     [facility] gives you secure access to your electronic health record. If you see a primary care provider, you can also send messages to your care team and make appointments. If you have questions, please call your primary care clinic.  If you do not have a primary care provider, please call [phone] and they  will assist you.        Care EveryWhere ID     This is your Care EveryWhere ID. This could be used by other organizations to access your [place] medical records  [id number]        Your Vitals Were     Pulse Pulse Oximetry BMI (Body Mass Index)             66 100% 29.26 kg/m2          Blood Pressure from Last 3 Encounters:   09/21/18 114/79   07/25/18 112/70   12/12/17 110/74    Weight from Last 3 Encounters:   09/21/18 160 lb (72.6 kg)   07/25/18 164 lb (74.4 kg)   12/12/17 151 lb (68.5 kg)               Primary Care Provider Office Phone # Fax #    Francesca Padron [phone] [address] [facility] [address]        Equal Access to Services     PRAMOD GUEVARA : Hadii lynn webbero Somelany, waaxda luqadaha, qaybta kaalmada adeegyada, fatimah reynolds . So [facility] [phone].    ATENCIÓN: Si habla español, tiene a evans disposición servicios gratuitos de asistencia lingüística. Llame al [phone].    We comply with applicable federal civil rights laws and [place] laws. We do not discriminate on the basis of race, color, national origin, age, disability, sex, sexual orientation, or gender identity.            Thank you!     Thank you for choosing [facility]  for your care. Our goal is always to provide you with excellent care. Hearing back from our patients is one way we can continue to improve our services. Please take a few minutes to complete the written survey that you may receive in the mail after your visit with us. Thank you!             Your Updated Medication List - Protect others around you: Learn how to safely use, store and throw away your medicines at www.disposemymeds.org.          This list is accurate as of 9/21/18 10:35 AM.  Always use your most recent med list.                   Brand Name Dispense Instructions for use Diagnosis    spironolactone 25 MG tablet    ALDACTONE    180 tablet    TAKE 1 TABLET(25 MG) BY MOUTH TWICE  DAILY    Cystic acne

## 2018-09-21 NOTE — PROGRESS NOTES
CC:  Consult from Dr. Padron for mennorhagia  HPI:  Millicent Celaya is a 38 year old female P0. No LMP recorded. Menses are monthly, lasting 10 days.  She has 5 heavy days per cycle where she uses pads and can be changing every hour and passing large clots from size.  She can go through one pack of pads in 3 days.. Her menstrual flow is limiting her clothing choices and lifestyle/activites.  Periods have been heavy her entire life, she went on birth control pill and that helped a lot.  Very upset that she cannot take the pill at this point.  Had an ultrasound last done in September 2010 that was normal without any fibroids seen.    Patients records are available and reviewed at today's visit.  Cannot take birth control pill due to history of pulmonary embolism and really prefers not to have an IUD, Depo-Provera or hysterectomy done.    Tearful at today's visit in discussing possible surgical options as she currently does not have a partner, but did not want to rule out possibility of having children in the future.  Aware she is older and unlikely to have children.  Is having a lot of trouble dealing with this possibility.      Past Medical History:   Diagnosis Date     Endometriosis      Menorrhagia     whole life     PE (pulmonary thromboembolism) (H) 2016       Past Surgical History:   Procedure Laterality Date     LAPAROSCOPIC SALPINGECTOMY  9/23/2010    left side       Family History   Problem Relation Age of Onset     Diabetes Father        Current Outpatient Prescriptions   Medication Sig Dispense Refill     spironolactone (ALDACTONE) 25 MG tablet TAKE 1 TABLET(25 MG) BY MOUTH TWICE DAILY 180 tablet 2       Allergies: Review of patient's allergies indicates no known allergies.    ROS:  C: NEGATIVE for fever, chills, change in weight  R: NEGATIVE for significant cough or SOB  CV: NEGATIVE for chest pain, palpitations or peripheral edema  GI: NEGATIVE for nausea, abdominal pain, heartburn, or change in bowel  habits  : NEGATIVE for frequency, dysuria, hematuria, vaginal discharge  P: NEGATIVE for changes in mood or affect      EXAM:  Blood pressure 114/79, pulse 66, weight 160 lb (72.6 kg), SpO2 100 %.  BMI= Body mass index is 29.26 kg/(m^2).  No LMP recorded.  General - pleasant female in no acute distress.  Neurological - alert and oriented X 3  Psychiatric - normal mood and affect    No other physical examination was performed today as we spent over 50% of today's 30 minute visit in face-to-face discussion and counseling about menorrhagia and treatment options.    ASSESSMENT/PLAN:  (N92.4) Excessive bleeding in premenopausal period  (primary encounter diagnosis)  Comment: No prior children  Plan: US Pelvic Complete with Transvaginal        Since has not had an ultrasound since 2010, will order an ultrasound and then I will plan to see her back after this.  Spent a long time discussing Mirena IUD as this would be a great option to help with her menorrhagia and is reversible so could still have children in the future if she desires.  Discussed the [place] single mother's by choice group and sperm donor possibility.  With her history of endometriosis she is not even sure if she can get pregnant.  Discussed if we did an endometrial ablation, pregnancy is not an option.    Mirena IUD was briefly discussed today and side effects.  Also discussed ablation, the procedure and recovery time.  Questions were answered about both.    A copy of the chart will be sent to the referring provider.    Purnima Chapman MD

## 2018-09-21 NOTE — Clinical Note
Barrett Ma!  Thanks for the referral.  She is a really nice lady and I will get an ultrasound and then probably do the Mirena IUD. :) I am trying to convince her.... Katie

## 2019-01-09 DIAGNOSIS — L70.0 CYSTIC ACNE: ICD-10-CM

## 2019-01-09 NOTE — TELEPHONE ENCOUNTER
"Requested Prescriptions   Pending Prescriptions Disp Refills     spironolactone (ALDACTONE) 25 MG tablet [Pharmacy Med Name: SPIRONOLACTONE 25MG TABLETS]  Last Written Prescription Date:  4/23/2018  Last Fill Quantity: 180 tablet,  # refills: 2   Last office visit: 7/25/2018 with prescribing provider:  LEA Padron   Future Office Visit:     180 tablet 0     Sig: TAKE 1 TABLET(25 MG) BY MOUTH TWICE DAILY    Diuretics (Including Combos) Protocol Failed - 1/9/2019 10:13 AM       Failed - Normal serum creatinine on file in past 12 months    Recent Labs   Lab Test 11/07/17  1310   CR 0.63             Failed - Normal serum potassium on file in past 12 months    Recent Labs   Lab Test 11/07/17  1310   POTASSIUM 4.0                   Failed - Normal serum sodium on file in past 12 months    Recent Labs   Lab Test 11/07/17  1310                Passed - Blood pressure under 140/90 in past 12 months    BP Readings from Last 3 Encounters:   09/21/18 114/79   07/25/18 112/70   12/12/17 110/74                Passed - Recent (12 mo) or future (30 days) visit within the authorizing provider's specialty    Patient had office visit in the last 12 months or has a visit in the next 30 days with authorizing provider or within the authorizing provider's specialty.  See \"Patient Info\" tab in inbasket, or \"Choose Columns\" in Meds & Orders section of the refill encounter.             Passed - Medication is active on med list       Passed - Patient is age 18 or older       Passed - No active pregancy on record       Passed - No positive pregnancy test in past 12 months           "

## 2019-01-14 RX ORDER — SPIRONOLACTONE 25 MG/1
TABLET ORAL
Qty: 180 TABLET | Refills: 1 | Status: SHIPPED | OUTPATIENT
Start: 2019-01-14 | End: 2019-03-12

## 2019-01-14 NOTE — TELEPHONE ENCOUNTER
Routing refill request to provider for review/approval because:  Labs not current:  ESTEPHANIA Salvador, Na    DARIANA FloresN, RN  [employer] [facility]

## 2019-01-15 NOTE — TELEPHONE ENCOUNTER
Prescription approved for short term supply, as labs due.  Could you let pt know, and have them schedule lab only?    Thank you!  SJ

## 2019-03-12 ENCOUNTER — OFFICE VISIT (OUTPATIENT)
Dept: FAMILY MEDICINE | Facility: CLINIC | Age: 39
End: 2019-03-12
Payer: COMMERCIAL

## 2019-03-12 VITALS
TEMPERATURE: 97.6 F | HEART RATE: 75 BPM | DIASTOLIC BLOOD PRESSURE: 80 MMHG | BODY MASS INDEX: 30 KG/M2 | OXYGEN SATURATION: 97 % | SYSTOLIC BLOOD PRESSURE: 120 MMHG | WEIGHT: 164 LBS

## 2019-03-12 DIAGNOSIS — L70.0 CYSTIC ACNE: ICD-10-CM

## 2019-03-12 DIAGNOSIS — D50.0 IRON DEFICIENCY ANEMIA DUE TO CHRONIC BLOOD LOSS: ICD-10-CM

## 2019-03-12 DIAGNOSIS — Z83.3 FAMILY HISTORY OF DIABETES MELLITUS: ICD-10-CM

## 2019-03-12 DIAGNOSIS — N92.4 EXCESSIVE BLEEDING IN PREMENOPAUSAL PERIOD: ICD-10-CM

## 2019-03-12 DIAGNOSIS — R20.0 NUMBNESS OF RIGHT FOOT: Primary | ICD-10-CM

## 2019-03-12 DIAGNOSIS — I26.99 OTHER ACUTE PULMONARY EMBOLISM WITHOUT ACUTE COR PULMONALE (H): ICD-10-CM

## 2019-03-12 LAB — HGB BLD-MCNC: 12 G/DL (ref 11.7–15.7)

## 2019-03-12 PROCEDURE — 99214 OFFICE O/P EST MOD 30 MIN: CPT | Performed by: FAMILY MEDICINE

## 2019-03-12 PROCEDURE — 85018 HEMOGLOBIN: CPT | Performed by: FAMILY MEDICINE

## 2019-03-12 PROCEDURE — 36415 COLL VENOUS BLD VENIPUNCTURE: CPT | Performed by: FAMILY MEDICINE

## 2019-03-12 PROCEDURE — 82728 ASSAY OF FERRITIN: CPT | Performed by: FAMILY MEDICINE

## 2019-03-12 PROCEDURE — 80048 BASIC METABOLIC PNL TOTAL CA: CPT | Performed by: FAMILY MEDICINE

## 2019-03-12 RX ORDER — SPIRONOLACTONE 50 MG/1
TABLET, FILM COATED ORAL
Qty: 90 TABLET | Refills: 3 | Status: SHIPPED | OUTPATIENT
Start: 2019-03-12 | End: 2019-03-12

## 2019-03-12 NOTE — PROGRESS NOTES
SUBJECTIVE:   Millicent Celaya is a 38 year old female who presents to clinic today for the following health issues:      numbness      Duration: off/on few weeks    Description (location/character/radiation): right foot    Intensity:  moderate    Accompanying signs and symptoms: pins and needles, low sensation    History (similar episodes/previous evaluation): None    Precipitating or alleviating factors: None    Therapies tried and outcome: None       Pt needs potasium for medication.    Very pleasant 38-year-old with history embolus, menorrhagia, endometriosis, iron deficiency anemia and strong family history of diabetes here today for several concerns.    1.  Patient takes spironolactone for acne.  This is worked very well for her.  She would like to continue it.  She is hoping for a refill of this today.  She is taking 25 mg twice a day.  Wonders if it can be adjusted specialist to take it once a day.    2.  Patient has noted numbness of her right foot.  It started when she was at work.  Got worse throughout the day.  Lasted for about 4 days.  Was just in her last 3 toes.  Now has been better for the last few days.  No history of similar.  No weakness.  No new shoes or injury.    3.  Patient has a history of menorrhagia and iron deficiency anemia.  Also has family history of diabetes.  Would like some lab test to evaluate this.    Problem list and histories reviewed & adjusted, as indicated.  Additional history: as documented    Reviewed and updated as needed this visit by clinical staff  Tobacco  Allergies  Meds  Med Hx  Surg Hx  Fam Hx  Soc Hx      Reviewed and updated as needed this visit by Provider         ROS:  Constitutional, HEENT, cardiovascular, pulmonary, gi and gu systems are negative, except as otherwise noted.    OBJECTIVE:     /80 (Cuff Size: Adult Large)   Pulse 75   Temp 97.6  F (36.4  C) (Tympanic)   Wt 74.4 kg (164 lb)   SpO2 97%   BMI 30.00 kg/m    Body mass index is 30  kg/m .  GENERAL: healthy, alert and no distress  MS: no gross musculoskeletal defects noted, no edema  SKIN: no suspicious lesions or rashes  NEURO: Normal strength and tone, mentation intact and speech normal  NEURO: Normal strength and tone, sensory exam grossly normal, mentation intact, DTR's normal and symmetric FOOT: normal monofilament exam, 2+ pedal pulses, no signs of ulcers or other lesions    ASSESSMENT/PLAN:       ICD-10-CM    1. Numbness of right foot R20.0    2. Family history of diabetes mellitus Z83.3 Basic metabolic panel     Hemoglobin     Ferritin   3. Cystic acne L70.0 spironolactone (ALDACTONE) 50 MG tablet   4. History of acute pulmonary embolism without acute cor pulmonale (H) I26.99    5. Excessive bleeding in premenopausal period N92.4    6. Iron deficiency anemia due to chronic blood loss D50.0      1. For numbness in right foot.  I suspect this is a nerve injury.  It has resolved on its own.  As she has had no systemic or long-term problems and is unilateral I do not feel concerned about this at this point.  If it returns or she develops weakness or other issues let us know and we can do further evaluation.  2. For family history of diabetes.  Patient is concerned that numbness represents diabetes.  I reassured her that unilateral numbness does not represent diabetes.  However she would feel better with a blood test today we will do this today.  3. For acne well controlled on spironolactone.  We will go ahead and switch her to the 50 mg tablet.  She will take this once a day.  4. For history of pulmonary embolus.  She has had no recurrence.  Is watchful of symptoms.  5. For history of iron deficiency anemia.  We will go ahead and check a hemoglobin and ferritin.  She is continuing to bleed rather heavily.  She is not followed up on treatment for this.  She is not interested in most of her options.    Discussed with patient, all questions answered, in agreement with this plan, will return or  seek further care if not improving or worsening.      Francesca Padron MD  [facility]

## 2019-03-13 LAB
ANION GAP SERPL CALCULATED.3IONS-SCNC: 5 MMOL/L (ref 3–14)
BUN SERPL-MCNC: 14 MG/DL (ref 7–30)
CALCIUM SERPL-MCNC: 9.3 MG/DL (ref 8.5–10.1)
CHLORIDE SERPL-SCNC: 106 MMOL/L (ref 94–109)
CO2 SERPL-SCNC: 27 MMOL/L (ref 20–32)
CREAT SERPL-MCNC: 0.68 MG/DL (ref 0.52–1.04)
FERRITIN SERPL-MCNC: 11 NG/ML (ref 12–150)
GFR SERPL CREATININE-BSD FRML MDRD: >90 ML/MIN/{1.73_M2}
GLUCOSE SERPL-MCNC: 88 MG/DL (ref 70–99)
POTASSIUM SERPL-SCNC: 3.7 MMOL/L (ref 3.4–5.3)
SODIUM SERPL-SCNC: 138 MMOL/L (ref 133–144)

## 2019-03-13 NOTE — TELEPHONE ENCOUNTER
"Requested Prescriptions   Pending Prescriptions Disp Refills     spironolactone (ALDACTONE) 50 MG tablet [Pharmacy Med Name: SPIRONOLACTONE 50MG TABLETS]  Last Written Prescription Date:  3/12/2019  Last Fill Quantity: 90 tablet,  # refills: 3   Last office visit: 3/12/2019 with prescribing provider:  LEA Padron   Future Office Visit:     180 tablet 3     Sig: TAKE 1 TABLET BY MOUTH TWICE DAILY    Diuretics (Including Combos) Protocol Failed - 3/12/2019  5:17 PM       Failed - Normal serum creatinine on file in past 12 months    Recent Labs   Lab Test 11/07/17  1310   CR 0.63             Failed - Normal serum potassium on file in past 12 months    Recent Labs   Lab Test 11/07/17  1310   POTASSIUM 4.0                   Failed - Normal serum sodium on file in past 12 months    Recent Labs   Lab Test 11/07/17  1310                Passed - Blood pressure under 140/90 in past 12 months    BP Readings from Last 3 Encounters:   03/12/19 120/80   09/21/18 114/79   07/25/18 112/70                Passed - Recent (12 mo) or future (30 days) visit within the authorizing provider's specialty    Patient had office visit in the last 12 months or has a visit in the next 30 days with authorizing provider or within the authorizing provider's specialty.  See \"Patient Info\" tab in inbasket, or \"Choose Columns\" in Meds & Orders section of the refill encounter.             Passed - Medication is active on med list       Passed - Patient is age 18 or older       Passed - No active pregancy on record       Passed - No positive pregnancy test in past 12 months           "

## 2019-03-14 RX ORDER — SPIRONOLACTONE 50 MG/1
TABLET, FILM COATED ORAL
Qty: 60 TABLET | Refills: 0 | Status: SHIPPED | OUTPATIENT
Start: 2019-03-14 | End: 2019-07-10

## 2019-03-16 NOTE — RESULT ENCOUNTER NOTE
Barrett Ricks:  Your hemoglobin is improved, although your iron stores (ferritin) are still low.  Keep up the increased dietary iron intake!  Everything else is normal.  Let me know if you have any questions about this.  Dr. Francesca Padron MD  [facility]  [phone]

## 2019-06-21 ENCOUNTER — TELEPHONE (OUTPATIENT)
Dept: FAMILY MEDICINE | Facility: CLINIC | Age: 39
End: 2019-06-21

## 2019-06-21 NOTE — LETTER
June 21, 2019    Millicent Celaya  [address]  [place] [address]    Dear Gerson Izquierdo cares about your health and your health plan.  I have reviewed your medical conditions, medication list and lab results, and am making recommendations based on this review to better manage your health.    You are in particular need of attention regarding:  -Wellness (Physical) Visit     I am recommending that you:     -schedule a WELLNESS (Physical) APPOINTMENT with me.   I will check fasting labs the same day - nothing to eat except water and meds for 8-10 hours prior.    -schedule a PAP SMEAR EXAM which is due.  Please disregard this reminder if you have had this exam elsewhere within the last year.  It would be helpful for us to have a copy of your recent pap smear report in our file so that we can best coordinate your care.    If you are under/uninsured, we recommend you contact the [facility]. They offer pap smears at no charge or on a sliding fee charge. You can schedule with them at [phone]. Please have them send us the results.      Please call us at the [place] location:  [phone] or use [employer] to address the above recommendations.     Thank you for trusting [facility].  We appreciate the opportunity to serve you and look forward to supporting your healthcare in the future.    If you have (or plan to have) any of these tests done at a facility other than a [facility] or a [facility], please have the results sent to the [facility] location noted above.      Best Regards,    Francesca Padron MD

## 2019-06-21 NOTE — TELEPHONE ENCOUNTER
Panel Management Review      Patient has the following on her problem list: None      Composite cancer screening  Chart review shows that this patient is due/due soon for the following Pap Smear  Summary:    Patient is due/failing the following:   PAP and PHYSICAL    Action needed:   Patient needs office visit for physical.    Type of outreach:    Sent letter.    Questions for provider review:    None                                                                                                                                    .Joie NUGENT       Chart routed to  .

## 2019-07-10 ENCOUNTER — TELEPHONE (OUTPATIENT)
Dept: NURSING | Facility: CLINIC | Age: 39
End: 2019-07-10

## 2019-07-10 ENCOUNTER — NURSE TRIAGE (OUTPATIENT)
Dept: NURSING | Facility: CLINIC | Age: 39
End: 2019-07-10

## 2019-07-10 DIAGNOSIS — L70.0 CYSTIC ACNE: ICD-10-CM

## 2019-07-10 RX ORDER — SPIRONOLACTONE 50 MG/1
50 TABLET, FILM COATED ORAL DAILY
Qty: 90 TABLET | Refills: 0 | Status: SHIPPED | OUTPATIENT
Start: 2019-07-10 | End: 2019-07-12

## 2019-07-10 NOTE — TELEPHONE ENCOUNTER
Spoke to pt and let her know she should be taking 50mg once daily.  Pt doesn't get physicals done elsewhere[facility]

## 2019-07-10 NOTE — TELEPHONE ENCOUNTER
Patient wants to take the spironolactone medicaiton once a day, not tiwce a day.  She was previously taking it once a day and just saw that the bottle says twice a day.  Should she take 50 mg or 100 mg? Can she take it all at once?   She's unclear based on last discussion with MD and thinks the MD may have meant it to be 50mg once a day. Please call patient.  June Huang RN-[facility]

## 2019-07-10 NOTE — TELEPHONE ENCOUNTER
Patient wants to take the spironolactone medicaiton once a day, not tiwce a day.  She was previously taking it once a day and just saw that the bottle says twice a day.  Should she take 50 mg or 100 mg? Can she take it all at once?   She's unclear based on last discussion with MD and thinks the MD may have meant it to be 50mg once a day. Please call patient.  Epic encounter sent to the patient's clinic.    June Huang RN-[facility]

## 2019-07-10 NOTE — TELEPHONE ENCOUNTER
She's correct - it should read 50 mg once daily.  I'll correct her med list, can you call her and let her know?  Also, while you have her on the phone, can you let her know she's due for her yearly physical and pap, or if she gets at GYN find out where and we can send for records?  Thanks,  Dr. Francesca Padron MD/[facility]

## 2019-07-11 ENCOUNTER — TELEPHONE (OUTPATIENT)
Dept: FAMILY MEDICINE | Facility: CLINIC | Age: 39
End: 2019-07-11

## 2019-07-11 NOTE — TELEPHONE ENCOUNTER
Order from 3/12/2019    Fax from [facility] this afternoon. Please clarify order.     Should they be on 50 mg once a day  Should they be on 25 mg twice a day   Or another form of the medication?

## 2019-07-12 DIAGNOSIS — L70.0 CYSTIC ACNE: ICD-10-CM

## 2019-07-12 RX ORDER — SPIRONOLACTONE 50 MG/1
50 TABLET, FILM COATED ORAL DAILY
Qty: 90 TABLET | Refills: 0 | Status: SHIPPED | OUTPATIENT
Start: 2019-07-12 | End: 2020-05-11

## 2019-07-12 NOTE — TELEPHONE ENCOUNTER
Sorry, the old sig just got tacked on to the end when I refilled.  50 mg once a day.  Dr. Francesca Padron MD/[facility]

## 2019-09-29 ENCOUNTER — HEALTH MAINTENANCE LETTER (OUTPATIENT)
Age: 39
End: 2019-09-29

## 2020-03-15 ENCOUNTER — HEALTH MAINTENANCE LETTER (OUTPATIENT)
Age: 40
End: 2020-03-15

## 2020-05-11 ENCOUNTER — TELEPHONE (OUTPATIENT)
Dept: PODIATRY | Facility: CLINIC | Age: 40
End: 2020-05-11

## 2020-05-11 ENCOUNTER — VIRTUAL VISIT (OUTPATIENT)
Dept: PODIATRY | Facility: CLINIC | Age: 40
End: 2020-05-11
Payer: COMMERCIAL

## 2020-05-11 ENCOUNTER — VIRTUAL VISIT (OUTPATIENT)
Dept: FAMILY MEDICINE | Facility: CLINIC | Age: 40
End: 2020-05-11
Payer: COMMERCIAL

## 2020-05-11 DIAGNOSIS — Z83.3 FAMILY HISTORY OF DIABETES MELLITUS: ICD-10-CM

## 2020-05-11 DIAGNOSIS — L70.0 CYSTIC ACNE: ICD-10-CM

## 2020-05-11 DIAGNOSIS — R20.0 NUMBNESS OF FOOT: Primary | ICD-10-CM

## 2020-05-11 DIAGNOSIS — M79.672 LEFT FOOT PAIN: ICD-10-CM

## 2020-05-11 DIAGNOSIS — M25.552 PAIN OF LEFT HIP JOINT: ICD-10-CM

## 2020-05-11 DIAGNOSIS — R20.0 NUMBNESS OF LEFT FOOT: Primary | ICD-10-CM

## 2020-05-11 DIAGNOSIS — Z00.00 PREVENTATIVE HEALTH CARE: ICD-10-CM

## 2020-05-11 PROCEDURE — 99213 OFFICE O/P EST LOW 20 MIN: CPT | Mod: 95 | Performed by: FAMILY MEDICINE

## 2020-05-11 PROCEDURE — 99203 OFFICE O/P NEW LOW 30 MIN: CPT | Mod: TEL | Performed by: PODIATRIST

## 2020-05-11 RX ORDER — SPIRONOLACTONE 50 MG/1
50 TABLET, FILM COATED ORAL DAILY
Qty: 90 TABLET | Refills: 0 | Status: SHIPPED | OUTPATIENT
Start: 2020-05-11 | End: 2020-07-20

## 2020-05-11 NOTE — PATIENT INSTRUCTIONS
Follow up with Neurology.  Continue use of NSAIDs, topical pain reliever cream, stretches as needed.

## 2020-05-11 NOTE — PROGRESS NOTES
"Millicent Celaya is a 39 year old female who is being evaluated via a billable telephone visit.      The patient has been notified of following:     \"This telephone visit will be conducted via a call between you and your physician/provider. We have found that certain health care needs can be provided without the need for a physical exam.  This service lets us provide the care you need with a short phone conversation.  If a prescription is necessary we can send it directly to your pharmacy.  If lab work is needed we can place an order for that and you can then stop by our lab to have the test done at a later time.    Telephone visits are billed at different rates depending on your insurance coverage. During this emergency period, for some insurers they may be billed the same as an in-person visit.  Please reach out to your insurance provider with any questions.    If during the course of the call the physician/provider feels a telephone visit is not appropriate, you will not be charged for this service.\"    Patient has given verbal consent for Telephone visit?  Yes     What phone number would you like to be contacted at?     How would you like to obtain your AVS? 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  Mom and Grandmother are at a senior care facility in [place] - that's stressful.  But otherwise doing okay.    So appointment today is for numbness of foot.  " Excruciating pain at night and intermittent during the day.  Makes it hard to sleep.  Just on left foot.      Worries about diabetes.  Strong on her family - dad, grandparents on that side.    Just recent flare.  Started this past Thursday night.  Took aleve PM which helped.  Got aspercreme with lidocaine.  Foot massage.  Also found exercises on youtube which helped.    Better on Friday.  Okay for the weekend. Comes and goes.      Not in right foot at all.    Numbness is on side of big toe and on the top of foot.  Pain radiates up leg a bit.  Gets it in heel as well.      Reviewed and updated as needed this visit by Provider  Tobacco  Allergies  Meds  Problems  Med Hx  Surg Hx  Fam Hx         Review of Systems   Constitutional, HEENT, cardiovascular, pulmonary, gi and gu systems are negative, except as otherwise noted.       Objective   Reported vitals:  There were no vitals taken for this visit.   healthy, alert and no distress  PSYCH: Alert and oriented times 3; coherent speech, normal   rate and volume, able to articulate logical thoughts, able   to abstract reason, no tangential thoughts, no hallucinations   or delusions  Her affect is normal  RESP: No cough, no audible wheezing, able to talk in full sentences  Remainder of exam unable to be completed due to telephone visits    Diagnostic Test Results:  Labs reviewed in Epic        Assessment/Plan:  Millicent was seen today for musculoskeletal problem.    Diagnoses and all orders for this visit:    Numbness of left foot  Comments:  1. Waxing and waning - now worse and waking at night due to pain.  2. I'd like her to see podiatry.  3. They have appt today at 11 - made appt    Family history of diabetes mellitus  Comments:  1. Pt worried with numbness of foot.  2. Reassured as unilateral less likely.  3. However, with strong fam hx is due for check.  4. Ordered lab only A1C and glu  Orders:  -     **A1C FUTURE anytime; Future  -     **Basic metabolic panel  FUTURE anytime; Future    Cystic acne  Comments:  1. Sprironolactone helps.  2. Due for BMP.  3. Ordered both today, will come in lab only  Orders:  -     spironolactone (ALDACTONE) 50 MG tablet; Take 1 tablet (50 mg) by mouth daily    Preventative health care  Comments:  1. Overdue for CPE and pap.  2. We'll call to make appt for post-covid - July          Return in about 1 month (around 6/11/2020) for Post-Covid, Preventative Annual Visit, Pap.      Phone call duration:  19 minutes    Francesca Padron MD

## 2020-05-11 NOTE — PROGRESS NOTES
"Millicent Celaya is a 39 year old female who is being evaluated via a billable telephone visit.      The patient has been notified of following:     \"This telephone visit will be conducted via a call between you and your physician/provider. We have found that certain health care needs can be provided without the need for a physical exam.  This service lets us provide the care you need with a short phone conversation.  If a prescription is necessary we can send it directly to your pharmacy.  If lab work is needed we can place an order for that and you can then stop by our lab to have the test done at a later time.    Telephone visits are billed at different rates depending on your insurance coverage. During this emergency period, for some insurers they may be billed the same as an in-person visit.  Please reach out to your insurance provider with any questions.    If during the course of the call the physician/provider feels a telephone visit is not appropriate, you will not be charged for this service.\"    Patient has given verbal consent for Telephone visit?  Yes    How would you like to obtain your AVS? Codefasthart    Phone call duration: 14 minutes  Video visit attempt failed, so this was converted to telephone visit.      PATIENT HISTORY:  Millicent Celaya is a 39 year old female who presents as a phone visit for L foot numbness, pain.  Pt reports 1 week of L foot intense pain at night when laying down.  L hip pain also reported. No pain during day and with activity.  Reports 6-7 months of L foot dorsal aspect and medial 1st toe numbness.  No weakness/foot drop.  Pain is difficult to localize per pt but sometimes in plantar heel.  Mostly \"all over\" the foot.  Some radiation into lower leg.  Denies low back pain, radiating pain up or down entire leg.  NSAIDs, exercises, and Aspercreme have helped a bit.  Denies current pain while on the phone.    I was requested to see this patient for this issue by Dr Cydney Padron, " added on today.    Review of Systems:  No injury, fevers, swelling.       PAST MEDICAL HISTORY:   Past Medical History:   Diagnosis Date     Endometriosis      Menorrhagia     whole life     PE (pulmonary thromboembolism) (H) 2016        PAST SURGICAL HISTORY:   Past Surgical History:   Procedure Laterality Date     LAPAROSCOPIC SALPINGECTOMY  9/23/2010    left side        MEDICATIONS:   Current Outpatient Medications:      spironolactone (ALDACTONE) 50 MG tablet, Take 1 tablet (50 mg) by mouth daily, Disp: 90 tablet, Rfl: 0     ALLERGIES:  No Known Allergies     SOCIAL HISTORY:   Social History     Socioeconomic History     Marital status: Single     Spouse name: Not on file     Number of children: 0     Years of education: Not on file     Highest education level: Not on file   Occupational History     Occupation: manager     Employer: [employer]   Social Needs     Financial resource strain: Not on file     Food insecurity     Worry: Not on file     Inability: Not on file     Transportation needs     Medical: Not on file     Non-medical: Not on file   Tobacco Use     Smoking status: Never Smoker     Smokeless tobacco: Never Used   Substance and Sexual Activity     Alcohol use: Yes     Drug use: No     Sexual activity: Yes     Partners: Male     Birth control/protection: Condom   Lifestyle     Physical activity     Days per week: Not on file     Minutes per session: Not on file     Stress: Not on file   Relationships     Social connections     Talks on phone: Not on file     Gets together: Not on file     Attends Restorationism service: Not on file     Active member of club or organization: Not on file     Attends meetings of clubs or organizations: Not on file     Relationship status: Not on file     Intimate partner violence     Fear of current or ex partner: Not on file     Emotionally abused: Not on file     Physically abused: Not on file     Forced sexual activity: Not on file   Other Topics Concern      Parent/sibling w/ CABG, MI or angioplasty before 65F 55M? Not Asked   Social History Narrative     Not on file        FAMILY HISTORY:   Family History   Problem Relation Age of Onset     Diabetes Father         OBJECTIVE:  No vitals obtained.  GEN:  NAD.  PSYCH: Coherent speech, normal   rate and volume, able to articulate logical thoughts, able   to abstract reason, no tangential thoughts, no hallucinations   or delusions   RESP: No cough, no audible wheezing, able to talk in full sentences  Remainder of exam unable to be completed due to telephone visit    ASSESSMENT: L foot pain, numbness, L hip pain     PLAN:  Reviewed patient's chart in epic.  Discussed condition and treatment options including pros and cons.    Etiology of foot pain/symptoms not entirely clear.  DM neuropathy less likely (pt being screened for DM).  Pt has difficulty localizing problem.  ? Plantar fasciitis component, but no pain with activity or after rest.  This seems positional, worse at night.  This combined with concurrent numbness/hip pain suggests nerve issue, possibly proximal impingement.  No low back pain reported however.      I am doubtful x-ray of her foot would be helpful, but this was offered.  Pt deferred for now.    I advised f/u with Neurology, referral placed.  Consider office visit with imaging if Neurology visit is not revealing, if pt is not improving.  Sports Med may also be helpful.  Continue NSAIDs, stretching, Aspercreme prn.  Also discussed experimenting with sleeping position such as varying pillow height to see if this helps alleviate symptoms.    Pt agrees with plan and was appreciative.      F/u prn with me.    Clive Jenkins, DONAVON, FACFAS

## 2020-07-20 ENCOUNTER — MYC MEDICAL ADVICE (OUTPATIENT)
Dept: FAMILY MEDICINE | Facility: CLINIC | Age: 40
End: 2020-07-20

## 2020-07-20 DIAGNOSIS — L70.0 CYSTIC ACNE: ICD-10-CM

## 2020-07-20 RX ORDER — SPIRONOLACTONE 50 MG/1
50 TABLET, FILM COATED ORAL DAILY
Qty: 30 TABLET | Refills: 0 | Status: SHIPPED | OUTPATIENT
Start: 2020-07-20 | End: 2020-08-24

## 2020-07-20 NOTE — TELEPHONE ENCOUNTER
Patient needs updated blood work--had to reschedule    Patient asking for one more refill of medication in the interim.    Please advise.    Thanks! Aditi Cotton RN

## 2020-07-31 ENCOUNTER — MYC MEDICAL ADVICE (OUTPATIENT)
Dept: FAMILY MEDICINE | Facility: CLINIC | Age: 40
End: 2020-07-31

## 2020-08-05 DIAGNOSIS — Z83.3 FAMILY HISTORY OF DIABETES MELLITUS: ICD-10-CM

## 2020-08-05 LAB
ANION GAP SERPL CALCULATED.3IONS-SCNC: 4 MMOL/L (ref 3–14)
BUN SERPL-MCNC: 11 MG/DL (ref 7–30)
CALCIUM SERPL-MCNC: 8.5 MG/DL (ref 8.5–10.1)
CHLORIDE SERPL-SCNC: 107 MMOL/L (ref 94–109)
CO2 SERPL-SCNC: 27 MMOL/L (ref 20–32)
CREAT SERPL-MCNC: 0.75 MG/DL (ref 0.52–1.04)
GFR SERPL CREATININE-BSD FRML MDRD: >90 ML/MIN/{1.73_M2}
GLUCOSE SERPL-MCNC: 86 MG/DL (ref 70–99)
HBA1C MFR BLD: 4.7 % (ref 0–5.6)
POTASSIUM SERPL-SCNC: 4.1 MMOL/L (ref 3.4–5.3)
SODIUM SERPL-SCNC: 138 MMOL/L (ref 133–144)

## 2020-08-05 PROCEDURE — 83036 HEMOGLOBIN GLYCOSYLATED A1C: CPT | Performed by: FAMILY MEDICINE

## 2020-08-05 PROCEDURE — 80048 BASIC METABOLIC PNL TOTAL CA: CPT | Performed by: FAMILY MEDICINE

## 2020-08-05 PROCEDURE — 36415 COLL VENOUS BLD VENIPUNCTURE: CPT | Performed by: FAMILY MEDICINE

## 2020-08-05 NOTE — RESULT ENCOUNTER NOTE
Barrett Izquierdo,  Good news!  Your results are normal.  Please let me know if you have any further questions or concerns.  Sincerely,  Dr. Francesca Padron MD    Thank you for choosing the [facility] as your health care provider. Please don't hesitate to call with any questions or concerns [phone].

## 2020-08-22 ENCOUNTER — MYC MEDICAL ADVICE (OUTPATIENT)
Dept: FAMILY MEDICINE | Facility: CLINIC | Age: 40
End: 2020-08-22

## 2020-08-22 DIAGNOSIS — L70.0 CYSTIC ACNE: ICD-10-CM

## 2020-08-24 DIAGNOSIS — L70.0 CYSTIC ACNE: ICD-10-CM

## 2020-08-24 RX ORDER — SPIRONOLACTONE 50 MG/1
50 TABLET, FILM COATED ORAL DAILY
Qty: 30 TABLET | Refills: 1 | Status: SHIPPED | OUTPATIENT
Start: 2020-08-24 | End: 2020-11-09

## 2020-08-24 NOTE — TELEPHONE ENCOUNTER
I sent spironolactone for her.  The biggest thing is that she is overdue for her pelvic exam/pap smear which is a really important cancer screening test!  If she does that with me, can you help her make appointment?  If she sees OB/GYN we can just remind her and ask her to send records.    Thanks,  Dr. Francesca Padron MD / [facility]

## 2020-08-25 ENCOUNTER — OFFICE VISIT (OUTPATIENT)
Dept: OBGYN | Facility: CLINIC | Age: 40
End: 2020-08-25
Payer: COMMERCIAL

## 2020-08-25 VITALS
DIASTOLIC BLOOD PRESSURE: 92 MMHG | SYSTOLIC BLOOD PRESSURE: 131 MMHG | BODY MASS INDEX: 29.39 KG/M2 | WEIGHT: 159.7 LBS | HEIGHT: 62 IN | OXYGEN SATURATION: 99 % | HEART RATE: 94 BPM

## 2020-08-25 DIAGNOSIS — N92.4 EXCESSIVE BLEEDING IN PREMENOPAUSAL PERIOD: Primary | ICD-10-CM

## 2020-08-25 LAB
ERYTHROCYTE [DISTWIDTH] IN BLOOD BY AUTOMATED COUNT: 13.6 % (ref 10–15)
HCT VFR BLD AUTO: 33.8 % (ref 35–47)
HGB BLD-MCNC: 10.9 G/DL (ref 11.7–15.7)
MCH RBC QN AUTO: 28.5 PG (ref 26.5–33)
MCHC RBC AUTO-ENTMCNC: 32.2 G/DL (ref 31.5–36.5)
MCV RBC AUTO: 89 FL (ref 78–100)
PLATELET # BLD AUTO: 287 10E9/L (ref 150–450)
RBC # BLD AUTO: 3.82 10E12/L (ref 3.8–5.2)
WBC # BLD AUTO: 6 10E9/L (ref 4–11)

## 2020-08-25 PROCEDURE — 85027 COMPLETE CBC AUTOMATED: CPT | Performed by: OBSTETRICS & GYNECOLOGY

## 2020-08-25 PROCEDURE — 99214 OFFICE O/P EST MOD 30 MIN: CPT | Performed by: OBSTETRICS & GYNECOLOGY

## 2020-08-25 PROCEDURE — 36415 COLL VENOUS BLD VENIPUNCTURE: CPT | Performed by: OBSTETRICS & GYNECOLOGY

## 2020-08-25 PROCEDURE — 84443 ASSAY THYROID STIM HORMONE: CPT | Performed by: OBSTETRICS & GYNECOLOGY

## 2020-08-25 ASSESSMENT — MIFFLIN-ST. JEOR: SCORE: 1342.64

## 2020-08-26 LAB — TSH SERPL DL<=0.005 MIU/L-ACNC: 2.56 MU/L (ref 0.4–4)

## 2020-08-26 RX ORDER — SPIRONOLACTONE 50 MG/1
TABLET, FILM COATED ORAL
Qty: 90 TABLET | OUTPATIENT
Start: 2020-08-26

## 2020-08-27 NOTE — PROGRESS NOTES
"CC: Discuss hysterectomy  HPI:  Millicent Celaya is a 40 year old female Patient's last menstrual period was 08/20/2020.  I last saw patient September 2018 and at that time her cycles were monthly lasting up to 10 days.  She had been tearful discussing surgical options because she did not want to rule out possibility of children. (Does not want examination today as she is bleeding)    She returns to clinic today to discuss hysterectomy as she is tired of hemorrhaging each month and is no longer interested in childbearing.  Over the last 6 months, cycles are lasting 7 to 8 days where she cannot leave the house and has cramps that doubled her over, sometimes cannot get out of bed.  During this time she will go through a box and a half of pads, 45?  She also passes golf ball size clots and has waterfall bleeding.  She is decided she does not ever want to have another menses again.    Did not get ultrasound after her last visit and is due for a Pap smear.    Has history of pulmonary embolism so cannot take birth control pill and she does not want to have endometrial ablation, IUD or any hormones.    Has history of endometriosis with laparoscopic left salpingectomy, lysis of adhesions and fulguration of endometriosis performed September 23, 2010.  Op note is reviewed with the patient at today's visit in [facility].  She was told \"it is a real mass in there\".  Wants to discuss how hysterectomy could take place?    Allergies: Patient has no known allergies.    The patient's past medical history, social history and family history is reviewed at our visit and updated in EPIC.    EXAM:  Blood pressure (!) 131/92, pulse 94, height 1.575 m (5' 2\"), weight 72.4 kg (159 lb 11.2 oz), last menstrual period 08/20/2020, SpO2 99 %.  BMI= Body mass index is 29.21 kg/m .  Patient's last menstrual period was 08/20/2020.  General - pleasant female in no acute distress.  Neurological - alert and oriented X 3  Psychiatric - normal mood and " affect    No other physical examination was performed today as we spent over 50% of today's 30 minute visit in face-to-face discussion and counseling about hysterectomy and her history of endometriosis.    ASSESSMENT/PLAN:  (N92.4) Excessive bleeding in premenopausal period  (primary encounter diagnosis)  Comment: History of endometriosis with prior op note describing adhesions  Plan: US Pelvic Complete with Transvaginal, CBC with         platelets, TSH with free T4 reflex        Reviewed that she still needs to get ultrasound done so that we can determine size and shape of the uterus.  We will check labs today and I will plan to see her after the ultrasound for Pap smear and endometrial biopsy.    Discussed laparoscopic versus abdominal hysterectomy.  I think we can start laparoscopically and looking through her prior operative note, may need to convert to open if there is severe adhesions.  Discussed leaving the ovaries in place and possibly needing to do supracervical if bowel is attached to the lower part of the cervix.  Discussed higher risk for complications including bowel, bladder and ureter injury.  Will give preoperative Pyridium and discussed cystoscopy.  Briefly discussed recovery period and need for 6 weeks of no heavy lifting.  Even if able to complete this laparoscopically, expect her pain to be more given the adhesions and endometriosis.      When I see her back, we can discuss routes of surgery after her examination-- reviewed the uterus may be fixed or frozen in place versus mobile.  ,    Purnima Chapman MD

## 2020-09-01 ENCOUNTER — MYC MEDICAL ADVICE (OUTPATIENT)
Dept: OBGYN | Facility: CLINIC | Age: 40
End: 2020-09-01

## 2020-09-01 NOTE — TELEPHONE ENCOUNTER
Patient sent Lenda message. Saw you on 8/25 to discuss excessive bleeding and cramping with cycles. Plan is for US, pap, and EMB, then likely hyst.     She c/o severe pelvic pains starting late last week (towards the end of her cycle), lasted 2 days and now pain is back again. Moving and walking hurt. She has a hx of a ruptured cyst in her 20's. No fever or discharge. Pain is acute and localized to her pelvic region. US is scheduled for 10/7, followed by appointment with you. Patient wanted to get your take on her symptoms.     She is also wondering how soon she will be able to schedule her hysterectomy following her US. Please advise. Veronica Ngo RN    **After asking a few more questions to patient, she says she usually takes Aleve and that typically help with the cramps, but hasn't done anything for this pain. Patient doesn't really feel the pain when she is sitting, it is primarily when she moves. Most uncomfortable when she walks. Veronica Ngo RN

## 2020-09-01 NOTE — TELEPHONE ENCOUNTER
Definitely could be ovarian cyst rupture as those are very common.  They happen every month you are not on birth control.    Can take ibuprofen and use heat as needed.  Should resolve.    We will be doing a Pap smear and endometrial biopsy the same day as her ultrasound.  Then I will enter the order for her to schedule hysterectomy.  Depends on Madeline schedule if it will be scheduled that day or the following day... :)    Purnima Chapman MD

## 2020-09-01 NOTE — TELEPHONE ENCOUNTER
Patient notified of recommendation for heat and Ibuprofen. Should resolve on it's own. Will keep appointments as scheduled. Veronica Ngo RN

## 2020-10-07 ENCOUNTER — OFFICE VISIT (OUTPATIENT)
Dept: OBGYN | Facility: CLINIC | Age: 40
End: 2020-10-07
Attending: OBSTETRICS & GYNECOLOGY
Payer: COMMERCIAL

## 2020-10-07 ENCOUNTER — ANCILLARY PROCEDURE (OUTPATIENT)
Dept: ULTRASOUND IMAGING | Facility: CLINIC | Age: 40
End: 2020-10-07
Attending: OBSTETRICS & GYNECOLOGY
Payer: COMMERCIAL

## 2020-10-07 VITALS
SYSTOLIC BLOOD PRESSURE: 125 MMHG | HEART RATE: 96 BPM | WEIGHT: 160.3 LBS | BODY MASS INDEX: 29.5 KG/M2 | OXYGEN SATURATION: 97 % | HEIGHT: 62 IN | DIASTOLIC BLOOD PRESSURE: 82 MMHG

## 2020-10-07 DIAGNOSIS — N92.4 EXCESSIVE BLEEDING IN PREMENOPAUSAL PERIOD: Primary | ICD-10-CM

## 2020-10-07 DIAGNOSIS — N92.4 EXCESSIVE BLEEDING IN PREMENOPAUSAL PERIOD: ICD-10-CM

## 2020-10-07 DIAGNOSIS — Z12.4 PAP SMEAR FOR CERVICAL CANCER SCREENING: ICD-10-CM

## 2020-10-07 PROCEDURE — 58100 BIOPSY OF UTERUS LINING: CPT | Performed by: OBSTETRICS & GYNECOLOGY

## 2020-10-07 PROCEDURE — 76830 TRANSVAGINAL US NON-OB: CPT

## 2020-10-07 PROCEDURE — 88305 TISSUE EXAM BY PATHOLOGIST: CPT | Performed by: PATHOLOGY

## 2020-10-07 PROCEDURE — G0145 SCR C/V CYTO,THINLAYER,RESCR: HCPCS | Performed by: OBSTETRICS & GYNECOLOGY

## 2020-10-07 PROCEDURE — 87624 HPV HI-RISK TYP POOLED RSLT: CPT | Performed by: OBSTETRICS & GYNECOLOGY

## 2020-10-07 PROCEDURE — 99213 OFFICE O/P EST LOW 20 MIN: CPT | Mod: 25 | Performed by: OBSTETRICS & GYNECOLOGY

## 2020-10-07 ASSESSMENT — MIFFLIN-ST. JEOR: SCORE: 1345.37

## 2020-10-08 ENCOUNTER — TELEPHONE (OUTPATIENT)
Dept: OBGYN | Facility: CLINIC | Age: 40
End: 2020-10-08

## 2020-10-08 PROBLEM — N92.4 EXCESSIVE BLEEDING IN PREMENOPAUSAL PERIOD: Status: ACTIVE | Noted: 2020-10-08

## 2020-10-08 NOTE — PROGRESS NOTES
"CC: Discuss ultrasound results  HPI:  Millicent Celaya is a 40 year old female Patient's last menstrual period was 10/07/2020.  Patient was last seen in August in clinic and note is reviewed from that time.  Her cycles are monthly lasting up to 10 days and cannot leave the house for approximately 1 week.  Has a towel and does get up at night to change her pad multiple times.  Wants hysterectomy at this point.    Ultrasound today shows a fibroid uterus with at least 3 small fibroids measured.  Still images are reviewed with the patient at today's visit.  Normal ovaries.      Allergies: Patient has no known allergies.    The patient's past medical history, social history and family history is reviewed at our visit and updated in EPIC.    EXAM:  Blood pressure 125/82, pulse 96, height 1.575 m (5' 2\"), weight 72.7 kg (160 lb 4.8 oz), last menstrual period 10/07/2020, SpO2 97 %.  General - pleasant female in no acute distress.  Abdomen - soft, nontender, nondistended, no hepatosplenomegaly.  Pelvic - EG: normal adult female, BUS: within normal limits, Vagina: well rugated, no discharge, Cervix: no lesions or CMT, Uterus: firm, slightly enlarged and globular but feels mobile  Rectovaginal - deferred.  Psychiactric - appropriate mood and affect  Neurological - alert and oriented X 3    PROCEDURE:  After informed consent was obtained from the patient, a speculum was placed in the vagina to visualize the cervix.  The cervix was then swabbed with a betadine prep and Xylocaine jelly applied.  Endometrial biopsy pipelle was passed through the cervical os and tissue obtained.  Uterus sounded to 8 cm.  There were no complications. The patient tolerated the procedure well with a minimal amount of cramping noted.  Specimen was sent to pathology.    ASSESSMENT/PLAN:  (N92.4) Excessive bleeding in premenopausal period  (primary encounter diagnosis)  Comment: Fibroids and endometriosis  Plan: Case Request: HYSTERECTOMY, TOTAL,         " LAPAROSCOPIC, WITH RIGHT  SALPINGECTOMY,         LAPAROTOMY, Surgical pathology exam,         ENDOMETRIAL BIOPSY W/O CERVICAL DILATION        Hysterectomy was discussed with the patient in detail.  A cog brochure was given.  laprascopic and abdominal hysterectomy were reviewed. We discussed keeping the ovaries in place and pt is agreeable. Discussed decreased risk of ovarian cancer if fallopian tubes are removed.  Risks and recovery period were discussed, specifically risk of bleeding, infection, damage to bowel, bladder, ureters, nerves, blood vessels. Outpatient surgery was discussed. Supracervical hyst was discussed.  Pt questions were answered.      As the uterus is not large, discussed laparoscopic approach and if many adhesions noted would then convert to laparotomy.  Plan to remove the cervix but if bowel is adherent and this seems dangerous, will leave cervix.  Discussed removing fallopian tubes but again if dangerous, would leave the tubes.  Discussed outpatient versus observation pending route of hysterectomy.  Discussed 6-week no heavy lifting regardless.  All questions were answered and she will schedule.  Discussed possible cystoscopy.    (Z12.4) Pap smear for cervical cancer screening  Plan: HPV High Risk Types DNA Cervical, Pap imaged         thin layer screen with HPV - recommended age 30        - 65 years (select HPV order below)        Pap smear was done today preop and plan to remove cervix.  As we are not sure about adhesions from prior surgery and endometriosis, did discuss possibility of supracervical hysterectomy as well.      Purnima Chapman MD

## 2020-10-08 NOTE — TELEPHONE ENCOUNTER
Type of surgery: gyn   Location of surgery: [facility] OR  Date and time of surgery: 12/4/20 730a  Surgeon: Ari  Pre-Op Appt Date: 11/27/20  Post-Op Appt Date: 6 weeks, pt will call to schedule   Packet sent out: Yes  Pre-cert/Authorization completed:  No  Date: 10/08/20  Madeline Padron, Surgery Coordinator

## 2020-10-10 DIAGNOSIS — Z11.59 ENCOUNTER FOR SCREENING FOR OTHER VIRAL DISEASES: Primary | ICD-10-CM

## 2020-10-10 LAB — COPATH REPORT: NORMAL

## 2020-10-12 LAB
COPATH REPORT: NORMAL
PAP: NORMAL

## 2020-10-13 LAB
FINAL DIAGNOSIS: ABNORMAL
HPV HR 12 DNA CVX QL NAA+PROBE: POSITIVE
HPV16 DNA SPEC QL NAA+PROBE: NEGATIVE
HPV18 DNA SPEC QL NAA+PROBE: NEGATIVE
SPECIMEN DESCRIPTION: ABNORMAL
SPECIMEN SOURCE CVX/VAG CYTO: ABNORMAL

## 2020-10-14 PROBLEM — R87.810 CERVICAL HIGH RISK HPV (HUMAN PAPILLOMAVIRUS) TEST POSITIVE: Status: ACTIVE | Noted: 2020-10-07

## 2020-10-15 ENCOUNTER — PATIENT OUTREACH (OUTPATIENT)
Dept: OBGYN | Facility: CLINIC | Age: 40
End: 2020-10-15

## 2020-10-15 NOTE — TELEPHONE ENCOUNTER
2006: NIL Pap  2009: NIL Pap  08/31/12: NIL Pap, Neg HR HPV.  above in care everywhere  10/07/20: NIL Pap, + HR HPV (not 16 or 18), EMB neg for dysplasia. Plan cotest in 1 year due 10/07/21.

## 2020-10-20 ENCOUNTER — MYC MEDICAL ADVICE (OUTPATIENT)
Dept: OBGYN | Facility: CLINIC | Age: 40
End: 2020-10-20

## 2020-10-20 NOTE — TELEPHONE ENCOUNTER
7 min phone call with pt about NIL pap with other hr HPV    She had thought this was possibly herpes.  Discussed HPV is much different than HSV and answered questions.  We plan to remove the cervix with her hysterectomy in December and then pathology would examine it.  Discussed if cervix has to remain due to adhesions, etc. then she would need repeat Pap smear in a year and typically the HPV resolves within 6 years.  Questions were answered and she felt very reassured after our phone call.    Purnima Chapman MD

## 2020-10-20 NOTE — TELEPHONE ENCOUNTER
Patient sent Freenomt message. She is hoping to have a short discussion with you in regards to her HPV results. After trying to ask what specific questions she has, patient states she hasn't really formulated them yet. Was hoping for a phone call today. Let me know if you'd rather I schedule her for a telephone appointment. Veronica Ngo RN

## 2020-10-21 ENCOUNTER — MYC MEDICAL ADVICE (OUTPATIENT)
Dept: OBGYN | Facility: CLINIC | Age: 40
End: 2020-10-21

## 2020-10-22 NOTE — TELEPHONE ENCOUNTER
Please let the patient know Dr. Chapman will be in touch next week when she returns.  Thanks    REMY GUERRA MD

## 2020-10-22 NOTE — TELEPHONE ENCOUNTER
"Patient aware.  Will need to send to [facility] again so that she is able to review as [employer] messages have been marked \"done\" before provider is able to see. IT working on issue. Veronica Ngo RN    "

## 2020-10-22 NOTE — TELEPHONE ENCOUNTER
For Dr. Chapman only:    Do you want patient to get compression stockings prior to her hysterectomy in December? History of PE a few years ago. Any other recs? Veronica Ngo RN

## 2020-10-26 NOTE — TELEPHONE ENCOUNTER
I think regular compression hose (thigh high) would be good and I think we will do lovenox daily shots for 4-6 weeks after surgery to make sure no clotting issues.    Purnima Chapman MD

## 2020-11-08 ENCOUNTER — MYC MEDICAL ADVICE (OUTPATIENT)
Dept: FAMILY MEDICINE | Facility: CLINIC | Age: 40
End: 2020-11-08

## 2020-11-08 DIAGNOSIS — L70.0 CYSTIC ACNE: ICD-10-CM

## 2020-11-09 DIAGNOSIS — L70.0 CYSTIC ACNE: ICD-10-CM

## 2020-11-09 RX ORDER — SPIRONOLACTONE 50 MG/1
50 TABLET, FILM COATED ORAL DAILY
Qty: 30 TABLET | Refills: 0 | Status: SHIPPED | OUTPATIENT
Start: 2020-11-09 | End: 2021-03-01

## 2020-11-11 ENCOUNTER — TELEPHONE (OUTPATIENT)
Dept: OBGYN | Facility: CLINIC | Age: 40
End: 2020-11-11

## 2020-11-11 RX ORDER — SPIRONOLACTONE 50 MG/1
TABLET, FILM COATED ORAL
Qty: 90 TABLET | OUTPATIENT
Start: 2020-11-11

## 2020-11-11 NOTE — TELEPHONE ENCOUNTER
Forms received and filled out. Placed in provider basket to be reviewed and signed.  Madeline Padron, Surgery Coordinator

## 2020-11-12 NOTE — TELEPHONE ENCOUNTER
Forms signed and returned. Faxed to number provided and sent to HIM.  Madeline Padron, Surgery Coordinator

## 2020-11-27 ENCOUNTER — OFFICE VISIT (OUTPATIENT)
Dept: FAMILY MEDICINE | Facility: CLINIC | Age: 40
End: 2020-11-27
Payer: COMMERCIAL

## 2020-11-27 VITALS
WEIGHT: 162 LBS | DIASTOLIC BLOOD PRESSURE: 85 MMHG | BODY MASS INDEX: 29.63 KG/M2 | SYSTOLIC BLOOD PRESSURE: 133 MMHG | HEART RATE: 93 BPM | TEMPERATURE: 98.3 F | RESPIRATION RATE: 16 BRPM

## 2020-11-27 DIAGNOSIS — Z01.818 PREOP GENERAL PHYSICAL EXAM: Primary | ICD-10-CM

## 2020-11-27 DIAGNOSIS — N92.4 EXCESSIVE BLEEDING IN PREMENOPAUSAL PERIOD: ICD-10-CM

## 2020-11-27 LAB
ANION GAP SERPL CALCULATED.3IONS-SCNC: 4 MMOL/L (ref 3–14)
BASOPHILS # BLD AUTO: 0 10E9/L (ref 0–0.2)
BASOPHILS NFR BLD AUTO: 0.2 %
BUN SERPL-MCNC: 13 MG/DL (ref 7–30)
CALCIUM SERPL-MCNC: 9.2 MG/DL (ref 8.5–10.1)
CHLORIDE SERPL-SCNC: 106 MMOL/L (ref 94–109)
CO2 SERPL-SCNC: 27 MMOL/L (ref 20–32)
CREAT SERPL-MCNC: 0.73 MG/DL (ref 0.52–1.04)
DIFFERENTIAL METHOD BLD: ABNORMAL
EOSINOPHIL # BLD AUTO: 0.2 10E9/L (ref 0–0.7)
EOSINOPHIL NFR BLD AUTO: 1.8 %
ERYTHROCYTE [DISTWIDTH] IN BLOOD BY AUTOMATED COUNT: 13.6 % (ref 10–15)
GFR SERPL CREATININE-BSD FRML MDRD: >90 ML/MIN/{1.73_M2}
GLUCOSE SERPL-MCNC: 91 MG/DL (ref 70–99)
HCT VFR BLD AUTO: 34.1 % (ref 35–47)
HGB BLD-MCNC: 10.7 G/DL (ref 11.7–15.7)
LYMPHOCYTES # BLD AUTO: 1.4 10E9/L (ref 0.8–5.3)
LYMPHOCYTES NFR BLD AUTO: 16.3 %
MCH RBC QN AUTO: 26.8 PG (ref 26.5–33)
MCHC RBC AUTO-ENTMCNC: 31.4 G/DL (ref 31.5–36.5)
MCV RBC AUTO: 85 FL (ref 78–100)
MONOCYTES # BLD AUTO: 0.7 10E9/L (ref 0–1.3)
MONOCYTES NFR BLD AUTO: 7.8 %
NEUTROPHILS # BLD AUTO: 6.5 10E9/L (ref 1.6–8.3)
NEUTROPHILS NFR BLD AUTO: 73.9 %
PLATELET # BLD AUTO: 250 10E9/L (ref 150–450)
POTASSIUM SERPL-SCNC: 4.3 MMOL/L (ref 3.4–5.3)
RBC # BLD AUTO: 4 10E12/L (ref 3.8–5.2)
SODIUM SERPL-SCNC: 137 MMOL/L (ref 133–144)
WBC # BLD AUTO: 8.8 10E9/L (ref 4–11)

## 2020-11-27 PROCEDURE — 99214 OFFICE O/P EST MOD 30 MIN: CPT | Performed by: PHYSICIAN ASSISTANT

## 2020-11-27 PROCEDURE — 36415 COLL VENOUS BLD VENIPUNCTURE: CPT | Performed by: PHYSICIAN ASSISTANT

## 2020-11-27 PROCEDURE — 80048 BASIC METABOLIC PNL TOTAL CA: CPT | Performed by: PHYSICIAN ASSISTANT

## 2020-11-27 PROCEDURE — 85025 COMPLETE CBC W/AUTO DIFF WBC: CPT | Performed by: PHYSICIAN ASSISTANT

## 2020-11-27 NOTE — PATIENT INSTRUCTIONS

## 2020-11-27 NOTE — PROGRESS NOTES
[facility] [address] [place] [address]  Phone: [phone]  Primary Provider: Francesca Padron  Pre-op Performing Provider: VERONICA BOLANOS    PREOPERATIVE EVALUATION:  Today's date: 11/27/2020    Millicent Celaya is a 40 year old female who presents for a preoperative evaluation.    Surgical Information:  Surgery/Procedure: Hysterctomy  Surgery Location: [facility]  Surgeon: Ari  Surgery Date: 12-4-20  Time of Surgery: 7:30   Where patient plans to recover: At home with family  Fax number for surgical facility: Note does not need to be faxed, will be available electronically in Epic.    Type of Anesthesia Anticipated: General    Subjective     HPI related to upcoming procedure:     Millicent Celaya is a pleasant 40 year old female with a history of acne, provoked pulmonary embolism, menorrhagia, and endometriosis presenting for preoperative exam. She reports heavy, painful menstrual cycles each month for years. She is tired of dealing with hemorrhaging and living in discomfort. No longer interested in childbearing. Feeling excited and optimistic about upcoming procedure.       Preop Questions 11/26/2020   1. Have you ever had a heart attack or stroke? No   2. Have you ever had surgery on your heart or blood vessels, such as a stent placement, a coronary artery bypass, or surgery on an artery in your head, neck, heart, or legs? No   3. Do you have chest pain with activity? No   4. Do you have a history of  heart failure? No   5. Do you currently have a cold, bronchitis or symptoms of other infection? No   6. Do you have a cough, shortness of breath, or wheezing? No   7. Do you or anyone in your family have previous history of blood clots? YES - history of PE in 2016 thought to be provoked due to left calf injury. Was on Xarelto for 6-12 months (cannot recall entirely) then off without further issues.    8. Do you or does anyone in your family have a serious bleeding  problem such as prolonged bleeding following surgeries or cuts? No   9. Have you ever had problems with anemia or been told to take iron pills? YES - heavy menstrual bleeding   10. Have you had any abnormal blood loss such as black, tarry or bloody stools, or abnormal vaginal bleeding? YES - heavy menstrual bleeding   11. Have you ever had a blood transfusion? YES    11a. Have you ever had a transfusion reaction? No   12. Are you willing to have a blood transfusion if it is medically needed before, during, or after your surgery? Yes   13. Have you or any of your relatives ever had problems with anesthesia? No   14. Do you have sleep apnea, excessive snoring or daytime drowsiness? No   15. Do you have any artifical heart valves or other implanted medical devices like a pacemaker, defibrillator, or continuous glucose monitor? No   16. Do you have artificial joints? No   17. Are you allergic to latex? No   18. Is there any chance that you may be pregnant? No       Health Care Directive:  Patient does not have a Health Care Directive or Living Will: Discussed advance care planning with patient; however, patient declined at this time.    Preoperative Review of :   reviewed - no record of controlled substances prescribed.        Review of Systems  CONSTITUTIONAL: NEGATIVE for fever, chills, change in weight  INTEGUMENTARY/SKIN: NEGATIVE for worrisome rashes, moles or lesions  EYES: NEGATIVE for vision changes or irritation  ENT/MOUTH: NEGATIVE for ear, mouth and throat problems  RESP: NEGATIVE for significant cough or SOB  BREAST: NEGATIVE for masses, tenderness or discharge  CV: NEGATIVE for chest pain, palpitations or peripheral edema  GI: NEGATIVE for nausea, abdominal pain, heartburn, or change in bowel habits  : NEGATIVE for frequency, dysuria, or hematuria  MUSCULOSKELETAL: POSITIVE for right hand tingling managed with splint has been diagnosed with possible carpal tunnel syndrome on the right   NEURO:  NEGATIVE for weakness, dizziness or paresthesias  ENDOCRINE: NEGATIVE for temperature intolerance, skin/hair changes  HEME: POSITIVE for heavy menstrual bleeding and history of PE in 2016  PSYCHIATRIC: NEGATIVE for changes in mood or affect    Patient Active Problem List    Diagnosis Date Noted     Excessive bleeding in premenopausal period 10/08/2020     Priority: Medium     Added automatically from request for surgery [id number]       Cervical high risk HPV (human papillomavirus) test positive 10/07/2020     Priority: Medium     2006: NIL Pap  2009: NIL Pap  08/31/12: NIL Pap, Neg HR HPV.  above in care everywhere  10/07/20: NIL Pap, + HR HPV (not 16 or 18), EMB neg for dysplasia. Plan cotest in 1 year due 10/07/21.  10/15/20:Msg left.  10/19/20:Pt was advised.         Cystic acne 03/12/2019     Priority: Medium     Iron deficiency anemia 01/27/2017     Priority: Medium     Other acute pulmonary embolism without acute cor pulmonale (H) 11/18/2016     Priority: Medium     Endometriosis 11/18/2016     Priority: Medium     Family history of diabetes mellitus 11/18/2016     Priority: Medium     mom/grandma type 2 (darnell)  dad type 1       Hydrosalpinx 09/16/2010     Priority: Medium     Menorrhagia 05/11/2009     Priority: Medium      Past Medical History:   Diagnosis Date     Cervical high risk HPV (human papillomavirus) test positive 10/07/2020    10/07/20     Endometriosis      Menorrhagia     whole life     PE (pulmonary thromboembolism) (H) 2016     Past Surgical History:   Procedure Laterality Date     LAPAROSCOPIC SALPINGECTOMY  9/23/2010    left side     Current Outpatient Medications   Medication Sig Dispense Refill     spironolactone (ALDACTONE) 50 MG tablet Take 1 tablet (50 mg) by mouth daily 30 tablet 0       No Known Allergies     Social History     Tobacco Use     Smoking status: Never Smoker     Smokeless tobacco: Never Used   Substance Use Topics     Alcohol use: Yes       History   Drug Use No          Objective     There were no vitals taken for this visit.    Physical Exam    GENERAL APPEARANCE: healthy, alert and no distress     EYES: EOMI, PERRL     HENT: ear canals and TM's normal and nose and mouth without ulcers or lesions     NECK: no adenopathy, no asymmetry, masses, or scars and thyroid normal to palpation     RESP: lungs clear to auscultation - no rales, rhonchi or wheezes     CV: regular rates and rhythm, normal S1 S2, no S3 or S4 and no murmur, click or rub     ABDOMEN:  soft, nontender, no HSM or masses and bowel sounds normal     MS: extremities normal- no gross deformities noted, no evidence of inflammation in joints, FROM in all extremities.     SKIN: no suspicious lesions or rashes     NEURO: Normal strength and tone, sensory exam grossly normal, mentation intact and speech normal     PSYCH: mentation appears normal. and affect normal/bright     LYMPHATICS: No cervical adenopathy    Recent Labs   Lab Test 08/25/20  1528 08/05/20  0804 03/12/19  1704 03/12/19  1701   HGB 10.9*  --  12.0  --      --   --   --    NA  --  138  --  138   POTASSIUM  --  4.1  --  3.7   CR  --  0.75  --  0.68   A1C  --  4.7  --   --         Diagnostics:  Recent Results (from the past 24 hour(s))   CBC with platelets and differential    Collection Time: 11/27/20  2:45 PM   Result Value Ref Range    WBC 8.8 4.0 - 11.0 10e9/L    RBC Count 4.00 3.8 - 5.2 10e12/L    Hemoglobin 10.7 (L) 11.7 - 15.7 g/dL    Hematocrit 34.1 (L) 35.0 - 47.0 %    MCV 85 78 - 100 fl    MCH 26.8 26.5 - 33.0 pg    MCHC 31.4 (L) 31.5 - 36.5 g/dL    RDW 13.6 10.0 - 15.0 %    Platelet Count 250 150 - 450 10e9/L    Diff Method Automated Method     % Neutrophils 73.9 %    % Lymphocytes 16.3 %    % Monocytes 7.8 %    % Eosinophils 1.8 %    % Basophils 0.2 %    Absolute Neutrophil 6.5 1.6 - 8.3 10e9/L    Absolute Lymphocytes 1.4 0.8 - 5.3 10e9/L    Absolute Monocytes 0.7 0.0 - 1.3 10e9/L    Absolute Eosinophils 0.2 0.0 - 0.7 10e9/L    Absolute  Basophils 0.0 0.0 - 0.2 10e9/L      No EKG required, no history of coronary heart disease, significant arrhythmia, peripheral arterial disease or other structural heart disease.    Revised Cardiac Risk Index (RCRI):  The patient has the following serious cardiovascular risks for perioperative complications:   - No serious cardiac risks = 0 points     RCRI Interpretation: 0 points: Class I (very low risk - 0.4% complication rate)           Assessment & Plan   The proposed surgical procedure is considered INTERMEDIATE risk.    Problem List Items Addressed This Visit        Urinary    Excessive bleeding in premenopausal period    Relevant Orders    CBC with platelets and differential (Completed)    Basic metabolic panel  (Ca, Cl, CO2, Creat, Gluc, K, Na, BUN) (Completed)      Other Visit Diagnoses     Preop general physical exam    -  Primary    Relevant Orders    CBC with platelets and differential (Completed)    Basic metabolic panel  (Ca, Cl, CO2, Creat, Gluc, K, Na, BUN) (Completed)             Risks and Recommendations:  The patient has the following additional risks and recommendations for perioperative complications:   - Consult Hospitalist / IM to assist with post-op medical management  Anemia/Bleeding/Clotting:    - Significant bleeding history   - History of DVT or PE, consider DVT prevention postoperatively   - Anemia and does not require treatment prior to surgery. Monitor hemoglobin postoperatively    Medication Instructions:  Patient is to take all scheduled medications on the day of surgery EXCEPT for modifications listed below:   - naproxen (Aleve, Naprosyn): HOLD 4 days before surgery.     RECOMMENDATION:  APPROVAL GIVEN to proceed with proposed procedure, without further diagnostic evaluation.    Signed Electronically by: Vaibhav Montoya PA-C    Copy of this evaluation report is provided to requesting physician.    Preop [facility] Preop Guidelines    Revised Cardiac Risk Index

## 2020-11-30 DIAGNOSIS — Z01.818 PRE-OP EXAM: Primary | ICD-10-CM

## 2020-11-30 DIAGNOSIS — N92.4 EXCESSIVE BLEEDING IN PREMENOPAUSAL PERIOD: ICD-10-CM

## 2020-12-01 DIAGNOSIS — N92.4 EXCESSIVE BLEEDING IN PREMENOPAUSAL PERIOD: ICD-10-CM

## 2020-12-01 DIAGNOSIS — Z01.818 PRE-OP EXAM: ICD-10-CM

## 2020-12-01 DIAGNOSIS — Z11.59 ENCOUNTER FOR SCREENING FOR OTHER VIRAL DISEASES: ICD-10-CM

## 2020-12-01 LAB
ABO + RH BLD: NORMAL
ABO + RH BLD: NORMAL
BLD GP AB SCN SERPL QL: NORMAL
BLOOD BANK CMNT PATIENT-IMP: NORMAL
ERYTHROCYTE [DISTWIDTH] IN BLOOD BY AUTOMATED COUNT: 14.6 % (ref 10–15)
HCT VFR BLD AUTO: 34 % (ref 35–47)
HGB BLD-MCNC: 10.8 G/DL (ref 11.7–15.7)
MCH RBC QN AUTO: 27.1 PG (ref 26.5–33)
MCHC RBC AUTO-ENTMCNC: 31.8 G/DL (ref 31.5–36.5)
MCV RBC AUTO: 85 FL (ref 78–100)
PLATELET # BLD AUTO: 253 10E9/L (ref 150–450)
RBC # BLD AUTO: 3.98 10E12/L (ref 3.8–5.2)
SPECIMEN EXP DATE BLD: NORMAL
WBC # BLD AUTO: 4.9 10E9/L (ref 4–11)

## 2020-12-01 PROCEDURE — 36415 COLL VENOUS BLD VENIPUNCTURE: CPT | Performed by: OBSTETRICS & GYNECOLOGY

## 2020-12-01 PROCEDURE — 86900 BLOOD TYPING SEROLOGIC ABO: CPT | Performed by: OBSTETRICS & GYNECOLOGY

## 2020-12-01 PROCEDURE — U0003 INFECTIOUS AGENT DETECTION BY NUCLEIC ACID (DNA OR RNA); SEVERE ACUTE RESPIRATORY SYNDROME CORONAVIRUS 2 (SARS-COV-2) (CORONAVIRUS DISEASE [COVID-19]), AMPLIFIED PROBE TECHNIQUE, MAKING USE OF HIGH THROUGHPUT TECHNOLOGIES AS DESCRIBED BY CMS-2020-01-R: HCPCS | Performed by: OBSTETRICS & GYNECOLOGY

## 2020-12-01 PROCEDURE — 86850 RBC ANTIBODY SCREEN: CPT | Performed by: OBSTETRICS & GYNECOLOGY

## 2020-12-01 PROCEDURE — 86901 BLOOD TYPING SEROLOGIC RH(D): CPT | Performed by: OBSTETRICS & GYNECOLOGY

## 2020-12-01 PROCEDURE — 85027 COMPLETE CBC AUTOMATED: CPT | Performed by: OBSTETRICS & GYNECOLOGY

## 2020-12-02 LAB
SARS-COV-2 RNA SPEC QL NAA+PROBE: NOT DETECTED
SPECIMEN SOURCE: NORMAL

## 2020-12-04 ENCOUNTER — ANESTHESIA (OUTPATIENT)
Dept: SURGERY | Facility: CLINIC | Age: 40
End: 2020-12-04
Payer: COMMERCIAL

## 2020-12-04 ENCOUNTER — HOSPITAL ENCOUNTER (OUTPATIENT)
Facility: CLINIC | Age: 40
Discharge: HOME | End: 2020-12-04
Attending: OBSTETRICS & GYNECOLOGY | Admitting: OBSTETRICS & GYNECOLOGY
Payer: COMMERCIAL

## 2020-12-04 ENCOUNTER — ANESTHESIA EVENT (OUTPATIENT)
Dept: SURGERY | Facility: CLINIC | Age: 40
End: 2020-12-04
Payer: COMMERCIAL

## 2020-12-04 VITALS
TEMPERATURE: 97.9 F | HEART RATE: 97 BPM | BODY MASS INDEX: 29.82 KG/M2 | WEIGHT: 162.04 LBS | RESPIRATION RATE: 16 BRPM | SYSTOLIC BLOOD PRESSURE: 127 MMHG | OXYGEN SATURATION: 97 % | DIASTOLIC BLOOD PRESSURE: 83 MMHG | HEIGHT: 62 IN

## 2020-12-04 DIAGNOSIS — N92.4 EXCESSIVE BLEEDING IN PREMENOPAUSAL PERIOD: ICD-10-CM

## 2020-12-04 DIAGNOSIS — Z90.710 S/P HYSTERECTOMY: Primary | ICD-10-CM

## 2020-12-04 LAB
GLUCOSE SERPL-MCNC: 95 MG/DL (ref 70–99)
HCG UR QL: NEGATIVE
POTASSIUM SERPL-SCNC: 3.6 MMOL/L (ref 3.4–5.3)

## 2020-12-04 PROCEDURE — 88309 TISSUE EXAM BY PATHOLOGIST: CPT | Mod: 26 | Performed by: PATHOLOGY

## 2020-12-04 PROCEDURE — 250N000011 HC RX IP 250 OP 636: Performed by: OBSTETRICS & GYNECOLOGY

## 2020-12-04 PROCEDURE — 84132 ASSAY OF SERUM POTASSIUM: CPT | Performed by: ANESTHESIOLOGY

## 2020-12-04 PROCEDURE — 360N000029 HC SURGERY LEVEL 4 EA 15 ADDTL MIN - UMMC: Performed by: OBSTETRICS & GYNECOLOGY

## 2020-12-04 PROCEDURE — 761N000007 HC RECOVERY PHASE 2 EACH 15 MINS: Performed by: OBSTETRICS & GYNECOLOGY

## 2020-12-04 PROCEDURE — 250N000013 HC RX MED GY IP 250 OP 250 PS 637: Performed by: OBSTETRICS & GYNECOLOGY

## 2020-12-04 PROCEDURE — 250N000009 HC RX 250: Performed by: ANESTHESIOLOGY

## 2020-12-04 PROCEDURE — 761N000003 HC RECOVERY PHASE 1 LEVEL 2 FIRST HR: Performed by: OBSTETRICS & GYNECOLOGY

## 2020-12-04 PROCEDURE — 272N000001 HC OR GENERAL SUPPLY STERILE: Performed by: OBSTETRICS & GYNECOLOGY

## 2020-12-04 PROCEDURE — 88309 TISSUE EXAM BY PATHOLOGIST: CPT | Mod: TC | Performed by: OBSTETRICS & GYNECOLOGY

## 2020-12-04 PROCEDURE — 58571 TLH W/T/O 250 G OR LESS: CPT | Mod: 62 | Performed by: OBSTETRICS & GYNECOLOGY

## 2020-12-04 PROCEDURE — 82947 ASSAY GLUCOSE BLOOD QUANT: CPT | Performed by: ANESTHESIOLOGY

## 2020-12-04 PROCEDURE — 370N000001 HC ANESTHESIA TECHNICAL FEE, 1ST 30 MIN: Performed by: OBSTETRICS & GYNECOLOGY

## 2020-12-04 PROCEDURE — 250N000011 HC RX IP 250 OP 636: Performed by: ANESTHESIOLOGY

## 2020-12-04 PROCEDURE — 250N000011 HC RX IP 250 OP 636: Performed by: NURSE ANESTHETIST, CERTIFIED REGISTERED

## 2020-12-04 PROCEDURE — 250N000009 HC RX 250: Performed by: NURSE ANESTHETIST, CERTIFIED REGISTERED

## 2020-12-04 PROCEDURE — C1765 ADHESION BARRIER: HCPCS | Performed by: OBSTETRICS & GYNECOLOGY

## 2020-12-04 PROCEDURE — 370N000002 HC ANESTHESIA TECHNICAL FEE, EACH ADDTL 15 MIN: Performed by: OBSTETRICS & GYNECOLOGY

## 2020-12-04 PROCEDURE — 58571 TLH W/T/O 250 G OR LESS: CPT | Mod: 62 | Performed by: SURGERY

## 2020-12-04 PROCEDURE — 761N000004 HC RECOVERY PHASE 1 LEVEL 2 EA ADDTL HR: Performed by: OBSTETRICS & GYNECOLOGY

## 2020-12-04 PROCEDURE — 258N000003 HC RX IP 258 OP 636: Performed by: NURSE ANESTHETIST, CERTIFIED REGISTERED

## 2020-12-04 PROCEDURE — 250N000013 HC RX MED GY IP 250 OP 250 PS 637: Performed by: STUDENT IN AN ORGANIZED HEALTH CARE EDUCATION/TRAINING PROGRAM

## 2020-12-04 PROCEDURE — 36415 COLL VENOUS BLD VENIPUNCTURE: CPT | Performed by: ANESTHESIOLOGY

## 2020-12-04 PROCEDURE — 58571 TLH W/T/O 250 G OR LESS: CPT | Mod: 80 | Performed by: OBSTETRICS & GYNECOLOGY

## 2020-12-04 PROCEDURE — 81025 URINE PREGNANCY TEST: CPT | Performed by: OBSTETRICS & GYNECOLOGY

## 2020-12-04 PROCEDURE — 258N000003 HC RX IP 258 OP 636: Performed by: ANESTHESIOLOGY

## 2020-12-04 PROCEDURE — 360N000028 HC SURGERY LEVEL 4 1ST 30 MIN - UMMC: Performed by: OBSTETRICS & GYNECOLOGY

## 2020-12-04 PROCEDURE — 999N000139 HC STATISTIC PRE-PROCEDURE ASSESSMENT II: Performed by: OBSTETRICS & GYNECOLOGY

## 2020-12-04 PROCEDURE — 258N000001 HC RX 258: Performed by: OBSTETRICS & GYNECOLOGY

## 2020-12-04 PROCEDURE — 250N000003 HC SEVOFLURANE, EA 15 MIN: Performed by: OBSTETRICS & GYNECOLOGY

## 2020-12-04 RX ORDER — ACETAMINOPHEN 325 MG/1
650 TABLET ORAL EVERY 6 HOURS PRN
Qty: 100 TABLET | Refills: 0 | Status: SHIPPED | OUTPATIENT
Start: 2020-12-04 | End: 2021-12-04

## 2020-12-04 RX ORDER — LIDOCAINE 40 MG/G
CREAM TOPICAL
Status: DISCONTINUED | OUTPATIENT
Start: 2020-12-04 | End: 2020-12-04 | Stop reason: HOSPADM

## 2020-12-04 RX ORDER — CEFAZOLIN SODIUM 2 G/100ML
2 INJECTION, SOLUTION INTRAVENOUS
Status: COMPLETED | OUTPATIENT
Start: 2020-12-04 | End: 2020-12-04

## 2020-12-04 RX ORDER — SODIUM CHLORIDE, SODIUM LACTATE, POTASSIUM CHLORIDE, CALCIUM CHLORIDE 600; 310; 30; 20 MG/100ML; MG/100ML; MG/100ML; MG/100ML
INJECTION, SOLUTION INTRAVENOUS CONTINUOUS
Status: DISCONTINUED | OUTPATIENT
Start: 2020-12-04 | End: 2020-12-04 | Stop reason: HOSPADM

## 2020-12-04 RX ORDER — AMOXICILLIN 250 MG
1 CAPSULE ORAL DAILY
Qty: 100 TABLET | Refills: 0 | Status: SHIPPED | OUTPATIENT
Start: 2020-12-04 | End: 2021-01-13

## 2020-12-04 RX ORDER — DEXAMETHASONE SODIUM PHOSPHATE 4 MG/ML
INJECTION, SOLUTION INTRA-ARTICULAR; INTRALESIONAL; INTRAMUSCULAR; INTRAVENOUS; SOFT TISSUE PRN
Status: DISCONTINUED | OUTPATIENT
Start: 2020-12-04 | End: 2020-12-04

## 2020-12-04 RX ORDER — ONDANSETRON 2 MG/ML
INJECTION INTRAMUSCULAR; INTRAVENOUS PRN
Status: DISCONTINUED | OUTPATIENT
Start: 2020-12-04 | End: 2020-12-04

## 2020-12-04 RX ORDER — PHENAZOPYRIDINE HYDROCHLORIDE 200 MG/1
200 TABLET, FILM COATED ORAL ONCE
Status: COMPLETED | OUTPATIENT
Start: 2020-12-04 | End: 2020-12-04

## 2020-12-04 RX ORDER — HYDROMORPHONE HYDROCHLORIDE 1 MG/ML
.3-.5 INJECTION, SOLUTION INTRAMUSCULAR; INTRAVENOUS; SUBCUTANEOUS EVERY 5 MIN PRN
Status: DISCONTINUED | OUTPATIENT
Start: 2020-12-04 | End: 2020-12-04 | Stop reason: HOSPADM

## 2020-12-04 RX ORDER — LIDOCAINE HYDROCHLORIDE 20 MG/ML
INJECTION, SOLUTION INFILTRATION; PERINEURAL PRN
Status: DISCONTINUED | OUTPATIENT
Start: 2020-12-04 | End: 2020-12-04

## 2020-12-04 RX ORDER — NALOXONE HYDROCHLORIDE 0.4 MG/ML
0.2 INJECTION, SOLUTION INTRAMUSCULAR; INTRAVENOUS; SUBCUTANEOUS
Status: DISCONTINUED | OUTPATIENT
Start: 2020-12-04 | End: 2020-12-04 | Stop reason: HOSPADM

## 2020-12-04 RX ORDER — DEXAMETHASONE SODIUM PHOSPHATE 10 MG/ML
INJECTION, SOLUTION INTRAMUSCULAR; INTRAVENOUS PRN
Status: DISCONTINUED | OUTPATIENT
Start: 2020-12-04 | End: 2020-12-04

## 2020-12-04 RX ORDER — IBUPROFEN 600 MG/1
600 TABLET, FILM COATED ORAL EVERY 6 HOURS PRN
Qty: 60 TABLET | Refills: 0 | Status: SHIPPED | OUTPATIENT
Start: 2020-12-04 | End: 2021-12-04

## 2020-12-04 RX ORDER — PROPOFOL 10 MG/ML
INJECTION, EMULSION INTRAVENOUS PRN
Status: DISCONTINUED | OUTPATIENT
Start: 2020-12-04 | End: 2020-12-04

## 2020-12-04 RX ORDER — KETOROLAC TROMETHAMINE 30 MG/ML
INJECTION, SOLUTION INTRAMUSCULAR; INTRAVENOUS PRN
Status: DISCONTINUED | OUTPATIENT
Start: 2020-12-04 | End: 2020-12-04

## 2020-12-04 RX ORDER — BUPIVACAINE HYDROCHLORIDE AND EPINEPHRINE 2.5; 5 MG/ML; UG/ML
INJECTION, SOLUTION INFILTRATION; PERINEURAL PRN
Status: DISCONTINUED | OUTPATIENT
Start: 2020-12-04 | End: 2020-12-04

## 2020-12-04 RX ORDER — CEFAZOLIN SODIUM 1 G/3ML
1 INJECTION, POWDER, FOR SOLUTION INTRAMUSCULAR; INTRAVENOUS SEE ADMIN INSTRUCTIONS
Status: DISCONTINUED | OUTPATIENT
Start: 2020-12-04 | End: 2020-12-04 | Stop reason: HOSPADM

## 2020-12-04 RX ORDER — OXYCODONE HYDROCHLORIDE 5 MG/1
5 TABLET ORAL EVERY 4 HOURS PRN
Status: DISCONTINUED | OUTPATIENT
Start: 2020-12-04 | End: 2020-12-04 | Stop reason: HOSPADM

## 2020-12-04 RX ORDER — FENTANYL CITRATE 50 UG/ML
INJECTION, SOLUTION INTRAMUSCULAR; INTRAVENOUS PRN
Status: DISCONTINUED | OUTPATIENT
Start: 2020-12-04 | End: 2020-12-04

## 2020-12-04 RX ORDER — ONDANSETRON 4 MG/1
4 TABLET, ORALLY DISINTEGRATING ORAL EVERY 6 HOURS PRN
Qty: 20 TABLET | Refills: 0 | Status: SHIPPED | OUTPATIENT
Start: 2020-12-04 | End: 2021-01-13

## 2020-12-04 RX ORDER — PROPOFOL 10 MG/ML
INJECTION, EMULSION INTRAVENOUS CONTINUOUS PRN
Status: DISCONTINUED | OUTPATIENT
Start: 2020-12-04 | End: 2020-12-04

## 2020-12-04 RX ORDER — OXYCODONE HYDROCHLORIDE 5 MG/1
5 TABLET ORAL EVERY 6 HOURS PRN
Qty: 6 TABLET | Refills: 0 | Status: SHIPPED | OUTPATIENT
Start: 2020-12-04 | End: 2020-12-07

## 2020-12-04 RX ORDER — ONDANSETRON 4 MG/1
4 TABLET, ORALLY DISINTEGRATING ORAL
Status: DISCONTINUED | OUTPATIENT
Start: 2020-12-04 | End: 2020-12-04 | Stop reason: HOSPADM

## 2020-12-04 RX ORDER — ONDANSETRON 4 MG/1
4 TABLET, ORALLY DISINTEGRATING ORAL EVERY 30 MIN PRN
Status: DISCONTINUED | OUTPATIENT
Start: 2020-12-04 | End: 2020-12-04 | Stop reason: HOSPADM

## 2020-12-04 RX ORDER — NALOXONE HYDROCHLORIDE 0.4 MG/ML
0.4 INJECTION, SOLUTION INTRAMUSCULAR; INTRAVENOUS; SUBCUTANEOUS
Status: DISCONTINUED | OUTPATIENT
Start: 2020-12-04 | End: 2020-12-04 | Stop reason: HOSPADM

## 2020-12-04 RX ORDER — MEPERIDINE HYDROCHLORIDE 25 MG/ML
12.5 INJECTION INTRAMUSCULAR; INTRAVENOUS; SUBCUTANEOUS ONCE
Status: COMPLETED | OUTPATIENT
Start: 2020-12-04 | End: 2020-12-04

## 2020-12-04 RX ORDER — SODIUM CHLORIDE, SODIUM LACTATE, POTASSIUM CHLORIDE, CALCIUM CHLORIDE 600; 310; 30; 20 MG/100ML; MG/100ML; MG/100ML; MG/100ML
INJECTION, SOLUTION INTRAVENOUS CONTINUOUS PRN
Status: DISCONTINUED | OUTPATIENT
Start: 2020-12-04 | End: 2020-12-04

## 2020-12-04 RX ORDER — ONDANSETRON 2 MG/ML
4 INJECTION INTRAMUSCULAR; INTRAVENOUS EVERY 30 MIN PRN
Status: DISCONTINUED | OUTPATIENT
Start: 2020-12-04 | End: 2020-12-04 | Stop reason: HOSPADM

## 2020-12-04 RX ORDER — FENTANYL CITRATE 50 UG/ML
25-50 INJECTION, SOLUTION INTRAMUSCULAR; INTRAVENOUS
Status: DISCONTINUED | OUTPATIENT
Start: 2020-12-04 | End: 2020-12-04 | Stop reason: HOSPADM

## 2020-12-04 RX ORDER — OXYCODONE HYDROCHLORIDE 5 MG/1
5 TABLET ORAL
Status: COMPLETED | OUTPATIENT
Start: 2020-12-04 | End: 2020-12-04

## 2020-12-04 RX ORDER — ACETAMINOPHEN 325 MG/1
650 TABLET ORAL
Status: DISCONTINUED | OUTPATIENT
Start: 2020-12-04 | End: 2020-12-04 | Stop reason: HOSPADM

## 2020-12-04 RX ADMIN — HYDROMORPHONE HYDROCHLORIDE 0.25 MG: 1 INJECTION, SOLUTION INTRAMUSCULAR; INTRAVENOUS; SUBCUTANEOUS at 09:37

## 2020-12-04 RX ADMIN — DEXAMETHASONE SODIUM PHOSPHATE 2 MG: 10 INJECTION, SOLUTION INTRAMUSCULAR; INTRAVENOUS at 12:15

## 2020-12-04 RX ADMIN — FENTANYL CITRATE 25 MCG: 50 INJECTION, SOLUTION INTRAMUSCULAR; INTRAVENOUS at 11:17

## 2020-12-04 RX ADMIN — FENTANYL CITRATE 25 MCG: 50 INJECTION, SOLUTION INTRAMUSCULAR; INTRAVENOUS at 08:13

## 2020-12-04 RX ADMIN — MIDAZOLAM 2 MG: 1 INJECTION INTRAMUSCULAR; INTRAVENOUS at 07:38

## 2020-12-04 RX ADMIN — CEFAZOLIN 2 G: 10 INJECTION, POWDER, FOR SOLUTION INTRAVENOUS at 07:52

## 2020-12-04 RX ADMIN — ROCURONIUM BROMIDE 20 MG: 10 INJECTION INTRAVENOUS at 09:22

## 2020-12-04 RX ADMIN — FENTANYL CITRATE 25 MCG: 50 INJECTION, SOLUTION INTRAMUSCULAR; INTRAVENOUS at 09:31

## 2020-12-04 RX ADMIN — ROCURONIUM BROMIDE 30 MG: 10 INJECTION INTRAVENOUS at 08:50

## 2020-12-04 RX ADMIN — SODIUM CHLORIDE, POTASSIUM CHLORIDE, SODIUM LACTATE AND CALCIUM CHLORIDE: 600; 310; 30; 20 INJECTION, SOLUTION INTRAVENOUS at 08:32

## 2020-12-04 RX ADMIN — KETOROLAC TROMETHAMINE 30 MG: 30 INJECTION, SOLUTION INTRAMUSCULAR at 11:38

## 2020-12-04 RX ADMIN — FENTANYL CITRATE 75 MCG: 50 INJECTION, SOLUTION INTRAMUSCULAR; INTRAVENOUS at 07:46

## 2020-12-04 RX ADMIN — ONDANSETRON 4 MG: 2 INJECTION INTRAMUSCULAR; INTRAVENOUS at 11:08

## 2020-12-04 RX ADMIN — DEXMEDETOMIDINE HYDROCHLORIDE 40 MCG: 100 INJECTION, SOLUTION INTRAVENOUS at 12:15

## 2020-12-04 RX ADMIN — CEFAZOLIN 1 G: 10 INJECTION, POWDER, FOR SOLUTION INTRAVENOUS at 09:52

## 2020-12-04 RX ADMIN — ROCURONIUM BROMIDE 10 MG: 10 INJECTION INTRAVENOUS at 08:42

## 2020-12-04 RX ADMIN — LIDOCAINE HYDROCHLORIDE 30 MG: 20 INJECTION, SOLUTION INFILTRATION; PERINEURAL at 07:45

## 2020-12-04 RX ADMIN — FENTANYL CITRATE 25 MCG: 50 INJECTION, SOLUTION INTRAMUSCULAR; INTRAVENOUS at 08:05

## 2020-12-04 RX ADMIN — FENTANYL CITRATE 25 MCG: 50 INJECTION, SOLUTION INTRAMUSCULAR; INTRAVENOUS at 09:27

## 2020-12-04 RX ADMIN — MEPERIDINE HYDROCHLORIDE 12.5 MG: 25 INJECTION INTRAMUSCULAR; INTRAVENOUS; SUBCUTANEOUS at 13:33

## 2020-12-04 RX ADMIN — ROCURONIUM BROMIDE 40 MG: 10 INJECTION INTRAVENOUS at 07:49

## 2020-12-04 RX ADMIN — PROPOFOL 10 MCG/KG/MIN: 10 INJECTION, EMULSION INTRAVENOUS at 07:49

## 2020-12-04 RX ADMIN — SUGAMMADEX 200 MG: 100 INJECTION, SOLUTION INTRAVENOUS at 11:43

## 2020-12-04 RX ADMIN — ACETAMINOPHEN 650 MG: 325 TABLET, FILM COATED ORAL at 14:43

## 2020-12-04 RX ADMIN — DEXAMETHASONE SODIUM PHOSPHATE 6 MG: 4 INJECTION, SOLUTION INTRAMUSCULAR; INTRAVENOUS at 07:45

## 2020-12-04 RX ADMIN — OXYCODONE HYDROCHLORIDE 5 MG: 5 TABLET ORAL at 14:43

## 2020-12-04 RX ADMIN — SODIUM CHLORIDE, POTASSIUM CHLORIDE, SODIUM LACTATE AND CALCIUM CHLORIDE: 600; 310; 30; 20 INJECTION, SOLUTION INTRAVENOUS at 07:41

## 2020-12-04 RX ADMIN — PHENAZOPYRIDINE HYDROCHLORIDE 200 MG: 200 TABLET, FILM COATED ORAL at 06:00

## 2020-12-04 RX ADMIN — PROPOFOL 170 MG: 10 INJECTION, EMULSION INTRAVENOUS at 07:46

## 2020-12-04 RX ADMIN — ONDANSETRON 4 MG: 2 INJECTION INTRAMUSCULAR; INTRAVENOUS at 13:47

## 2020-12-04 RX ADMIN — BUPIVACAINE HYDROCHLORIDE AND EPINEPHRINE BITARTRATE 60 ML: 2.5; .005 INJECTION, SOLUTION INFILTRATION; PERINEURAL at 12:15

## 2020-12-04 ASSESSMENT — MIFFLIN-ST. JEOR: SCORE: 1353.25

## 2020-12-04 NOTE — DISCHARGE INSTRUCTIONS
Same-Day Surgery   Adult Discharge Orders & Instructions     For 24 hours after surgery:  1. Get plenty of rest.  A responsible adult must stay with you for at least 24 hours after you leave the hospital.   2. Pain medication can slow your reflexes. Do not drive or use heavy equipment.  If you have weakness or tingling, don't drive or use heavy equipment until this feeling goes away.  3. Mixing alcohol and pain medication can cause dizziness and slow your breathing. It can even be fatal. Do not drink alcohol while taking pain medication.  4. Avoid strenuous or risky activities.  Ask for help when climbing stairs.   5. You may feel lightheaded.  If so, sit for a few minutes before standing.  Have someone help you get up.   6. If you have nausea (feel sick to your stomach), drink only clear liquids such as apple juice, ginger ale, broth or 7-Up.  Rest may also help.  Be sure to drink enough fluids.  Move to a regular diet as you feel able. Take pain medications with a small amount of solid food, such as toast or crackers, to avoid nausea.   7. A slight fever is normal. Call the doctor if your fever is over 100 F (37.7 C) (taken under the tongue) or lasts longer than 24 hours.  8. You may have a dry mouth, muscle aches, trouble sleeping or a sore throat.  These symptoms should go away after 24 hours.  9. Do not make important or legal decisions.   Pain Management:      1. Take pain medication (if prescribed) for pain as directed by your physician.        2. WARNING: If the pain medication you have been prescribed contains Tylenol  (acetaminophen), DO NOT take additional doses of Tylenol (acetaminophen).     Call your doctor for any of the followin.  Signs of infection (fever, growing tenderness at the surgery site, severe pain, a large amount of drainage or bleeding, foul-smelling drainage, redness, swelling).    2.  It has been over 8 to 10 hours since surgery and you are still not able to urinate (pee).    3.   Headache for over 24 hours.    4.  If you experience a metallic taste in your mouth, numbness around your mouth, blurry vision or ringing in your ears call [phone] and ask for the Anesthesia provider on call. These can be signs of local anesthetic toxicity from the nerve block (TAP block)  you received to help with pain control.   To contact a doctor, call __Dr. Chapman's clinic [phone]______________ or:      [phone] and ask for the Resident On Call for:          ____OBGyn_______________________ (answered 24 hours a day)      Emergency Department:  [place] [facility]: [phone]  [place] [facility]: [phone]      Discharge Instructions: Vaginal or Abdominal Hysterectomy   Healing takes time. How much time depends on your health and the type of surgery you had. During your recovery time, you can do a lot to make sure that you regain your health and energy.    Diet    Eat a well-balanced diet with lots of protein, fruits, vegetables, and whole grains.  Avoid spicy or greasy foods.     Drink plenty of fluids - at least 8 tall glasses of water a day. Water is best. Limit caffeine (coffee, tea, soda) to help prevent constipation (hard stools that are difficult to pass).    If you are constipated, you may take one of these medications from the drug store: Docusate (Colace), Docusate with Casanthranol (Valarie-Colace), Psyllium (Metamucil), or Milk of Magnesia. Follow to directions on the label.  Activity    Get plenty of rest at first. Slowly return to your normal routine. Several short walks each day will help. It is okay to climb stairs, but use the handrail in case you feel dizzy.     After 2 weeks, you may start gentle exercises. Listen to your body. If you feel tired, sore, or have backaches, you may be doing too much too soon.     Do not drive until you can step on the brakes without pain.     Do not use tampons, douche, or have sex (intercourse) until you see your  doctor.     For the next 6 weeks, do not lift anything greater than 15 pounds and avoid heavy exercise.  Pain    Your pain should decrease over the next 2-3 weeks.     You may feel sore after mild exercise.    Take your pain medication as prescribed by your doctor.   Caring for your Incisions (if applicable)    You may see some fluid draining from your incision(s). Wear the bandage(s) until it stops.     Keep the area clean and dry. Wash with soap and water.    Do not use lotions or powders near the incision(s).    If you have:  o Dermabond (medical glue) - Leave it in place until it wears off.   Bathing  Take care to avoid slips and falls. Gently pat your incisions dry after bathing.    After Abdominal Hysterectomy (surgery through the belly): You may shower. Avoid tub baths and swimming for two weeks, of until your incision heals.   What to expect after surgery    A small amount of blood or fluid coming from your vagina for several weeks. Wear pads as needed.     Stitches poking out of the vagina.     Stitches passing out of the vagina (they will look like tiny threads).    Most stitches dissolve within 3 months.     Feeling numb around your stitches. This should go away in less than a year.     Feeling dizzy or light-headed. Hot flashes, trouble sleeping, sudden mood swings, and irritability. If side effects become a problem, notify your doctor.     If you had a vaginal repair, you may feel tugging in the vagina. This is a normal part of healing.   Call your doctor if you have:    Severe chills and a fever of 100.4 degrees F or higher, taken under the tongue.     Bright red blood coming out of the vagina - enough to soak one pad an hour.     Large clots coming out of the vagina.     Urine or vaginal fluid that smells bad.     Trouble urinating (peeing), burning when you go, or the need to go more often.     Calf pain and/or swelling in both legs.    Nausea (feeling sick to your stomach) or vomiting (throwing  up).    Pain that you cannot control with the pain medication prescribed by your doctor.   Follow up with your doctor and return to the clinic as directed  Make this appointment after you get home if it has not already been scheduled.     Important numbers  [facility] ([address]) - [place]: [phone]   [facility] ([address]) : [phone]

## 2020-12-04 NOTE — ANESTHESIA POSTPROCEDURE EVALUATION
Anesthesia POST Procedure Evaluation    Patient: Millicent Celaya   MRN:     [id number] Gender:   female   Age:    40 year old :      1980        Preoperative Diagnosis: Excessive bleeding in premenopausal period [N92.4]   Procedure(s):  LAPAROSCOPIC LYSIS OF ADHESIONS,HYSTERECTOMY, TOTAL, LAPAROSCOPIC, WITH RIGHT  SALPINGECTOMY AND CYSTOSCOPY  Laparoscopic lysis adhesions  Cystoscopy   Postop Comments: No value filed.     Anesthesia Type: General, Peripheral Nerve Block       Disposition: Outpatient   Postop Pain Control: Uneventful            Sign Out: Well controlled pain   PONV: No   Neuro/Psych: Uneventful            Sign Out: Acceptable/Baseline neuro status   Airway/Respiratory: Uneventful            Sign Out: Acceptable/Baseline resp. status   CV/Hemodynamics: Uneventful            Sign Out: Acceptable CV status   Other NRE: NONE   DID A NON-ROUTINE EVENT OCCUR? No         Last Anesthesia Record Vitals:  CRNA VITALS  2020 1153 - 2020 1253      2020             Pulse:  113    SpO2:  100 %    Resp Rate (observed):  24          Last PACU Vitals:  Vitals Value Taken Time   /81 20 1415   Temp 36.4  C (97.5  F) 20 1227   Pulse 93 20 1419   Resp 16 20 1419   SpO2 96 % 20 1419   Temp src     NIBP     Pulse     SpO2     Resp     Temp     Ht Rate     Temp 2     Vitals shown include unvalidated device data.      Electronically Signed By: Av Espinoza MD, 2020, 2:21 PM

## 2020-12-04 NOTE — BRIEF OP NOTE
[facility]     Brief Operative Note    Pre-operative diagnosis: Excessive bleeding in premenopausal period [N92.4], Endometriosis  Post-operative diagnosis Same as pre-operative diagnosis    Procedure: Procedure(s):  LAPAROSCOPIC LYSIS OF ADHESIONS,HYSTERECTOMY, TOTAL, LAPAROSCOPIC, WITH RIGHT  SALPINGECTOMY AND CYSTOSCOPY  Laparoscopic lysis adhesions  Cystoscopy  Surgeon: Surgeon(s) and Role:  Panel 1:     * Purnima Chapman MD - Primary     * Percy Resendiz MD - Assisting     * Betty Zafar MD - Assisting     * Mickey Victoria MD - Resident - Assisting  Panel 2:     * Percy Resendiz MD - Primary  Anesthesia: Combined General with Block   Estimated blood loss: 500 ml  Drains: None  Specimens:   ID Type Source Tests Collected by Time Destination   A :  Tissue Uterus, Cervix, Right Fallopian Tube SURGICAL PATHOLOGY EXAM Purnima Chapman MD 12/4/2020 10:57 AM      Findings: EUA revealed retroverted uterus, somewhat limited mobility. No adnexal masses noted. External genitalia, vaginal vault and cervix appeared normal. On laparoscopy, atraumatic entry and normal upper abdominal survey. Dense adhesions from colon to posterior uterine serosa and left ovary. Bilateral ovaries frozen in posterior cul-de-sac. Rupture of left ovarian cyst for chocolate colored fluid, consistent with endometriosis. Uterus with multiple subserosal fibroids. Surgically absent left fallopian tube. Normal appearing right fallopian tube. Bilateral efflux from UOs on cystoscopy. No evidence of bladder injury. Hemostatic surgical sites at end of case.    Complications: None.  Implants: * No implants in log *     Mickey Victoria MD

## 2020-12-04 NOTE — ANESTHESIA CARE TRANSFER NOTE
Patient: Millicent Celaya    Procedure(s):  LAPAROSCOPIC LYSIS OF ADHESIONS,HYSTERECTOMY, TOTAL, LAPAROSCOPIC, WITH RIGHT  SALPINGECTOMY AND CYSTOSCOPY  Laparoscopic lysis adhesions  Cystoscopy    Diagnosis: Excessive bleeding in premenopausal period [N92.4]  Diagnosis Additional Information: No value filed.    Anesthesia Type:   General, Peripheral Nerve Block     Note:  Airway :Face Mask  Patient transferred to:PACU  Handoff Report: Identifed the Patient, Identified the Reponsible Provider, Reviewed the pertinent medical history, Discussed the surgical course, Reviewed Intra-OP anesthesia mangement and issues during anesthesia, Set expectations for post-procedure period and Allowed opportunity for questions and acknowledgement of understanding      Vitals: (Last set prior to Anesthesia Care Transfer)    CRNA VITALS  12/4/2020 1153 - 12/4/2020 1234      12/4/2020             Pulse:  113    SpO2:  100 %    Resp Rate (observed):  24                Electronically Signed By: CHILANGO Alicea CRNA  December 4, 2020  12:34 PM

## 2020-12-04 NOTE — OR NURSING
Patient complaining of shivering that won't go away. Called Dr. Espinoza and got an order to give some IV demerol.

## 2020-12-04 NOTE — ANESTHESIA PREPROCEDURE EVALUATION
"Anesthesia Pre-Procedure Evaluation    Patient: Millicent Celaya   MRN:     [id number] Gender:   female   Age:    40 year old :      1980        Preoperative Diagnosis: Excessive bleeding in premenopausal period [N92.4]   Procedure(s):  HYSTERECTOMY, TOTAL, LAPAROSCOPIC, WITH RIGHT  SALPINGECTOMY  LAPAROTOMY     LABS:  CBC:   Lab Results   Component Value Date    WBC 4.9 2020    WBC 8.8 2020    HGB 10.8 (L) 2020    HGB 10.7 (L) 2020    HCT 34.0 (L) 2020    HCT 34.1 (L) 2020     2020     2020     BMP:   Lab Results   Component Value Date     2020     2020    POTASSIUM 3.6 2020    POTASSIUM 4.3 2020    CHLORIDE 106 2020    CHLORIDE 107 2020    CO2 27 2020    CO2 27 2020    BUN 13 2020    BUN 11 2020    CR 0.73 2020    CR 0.75 2020    GLC 95 2020    GLC 91 2020     COAGS: No results found for: PTT, INR, FIBR  POC:   Lab Results   Component Value Date    HCG Negative 2020     OTHER:   Lab Results   Component Value Date    A1C 4.7 2020    FAN 9.2 2020    TSH 2.56 2020        Preop Vitals    BP Readings from Last 3 Encounters:   20 (!) 132/92   20 133/85   10/07/20 125/82    Pulse Readings from Last 3 Encounters:   20 93   10/07/20 96   20 94      Resp Readings from Last 3 Encounters:   20 16   20 16   17 16    SpO2 Readings from Last 3 Encounters:   20 98%   10/07/20 97%   20 99%      Temp Readings from Last 1 Encounters:   20 37.2  C (99  F) (Oral)    Ht Readings from Last 1 Encounters:   20 1.575 m (5' 2\")      Wt Readings from Last 1 Encounters:   20 73.5 kg (162 lb 0.6 oz)    Estimated body mass index is 29.64 kg/m  as calculated from the following:    Height as of this encounter: 1.575 m (5' 2\").    Weight as of this encounter: 73.5 kg (162 lb 0.6 oz). "     LDA:        Past Medical History:   Diagnosis Date     Cervical high risk HPV (human papillomavirus) test positive 10/07/2020    10/07/20     Endometriosis      History of blood transfusion 2 years ago    Due in part to taking Xarelto for PE     Menorrhagia     whole life     PE (pulmonary thromboembolism) (H) 2016      Past Surgical History:   Procedure Laterality Date     ABDOMEN SURGERY  8 yrs ago - on file    For endomitriosis     LAPAROSCOPIC SALPINGECTOMY  9/23/2010    left side      No Known Allergies     Anesthesia Evaluation     . Pt has had prior anesthetic. Type: General    No history of anesthetic complications          ROS/MED HX    ENT/Pulmonary:  - neg pulmonary ROS     Neurologic: Comment: Carpal tunnel, acute, otherwise negative      Cardiovascular: Comment: Hx of provoked PE 2015 after left calf partial tear and immobilization, required hospitalization and heparin gtt, was on xarelto for 1 year, no issues since. No other history of PE or clots - neg cardiovascular ROS       METS/Exercise Tolerance:  >4 METS   Hematologic:     (+) Anemia (Hgb 10.8), History of Transfusion no previous transfusion reaction -      Musculoskeletal:  - neg musculoskeletal ROS       GI/Hepatic:        (-) GERD   Renal/Genitourinary:  - ROS Renal section negative       Endo:  - neg endo ROS       Psychiatric:  - neg psychiatric ROS       Infectious Disease:  - neg infectious disease ROS       Malignancy:         Other:                         PHYSICAL EXAM:   Mental Status/Neuro: A/A/O   Airway: Facies: Feasible  Mallampati: I  Mouth/Opening: Full  TM distance: > 6 cm  Neck ROM: Full   Respiratory: Auscultation: CTAB     Resp. Rate: Normal     Resp. Effort: Normal      CV: Rhythm: Regular  Rate: Age appropriate  Heart: Normal Sounds  Edema: None   Comments:      Dental: Normal Dentition                Assessment:   ASA SCORE: 2    H&P: History and physical reviewed and following examination; no interval change.    Smoking Status:  Non-Smoker/Unknown   NPO Status: NPO Appropriate     Plan:   Anes. Type:  General; Peripheral Nerve Block     Block Details: TAP   Pre-Medication: None   Induction:  IV (Standard)   Airway: ETT; Oral   Access/Monitoring: PIV   Maintenance: Balanced     Postop Plan:   Postop Pain: Opioids  Postop Sedation/Airway: Not planned  Disposition: Outpatient     PONV Management:   Adult Risk Factors: Female, Non-Smoker, Postop Opioids   Prevention: Ondansetron, Dexamethasone, Propofol     CONSENT: Direct conversation   Plan and risks discussed with: Patient   Blood Products: Consented (ALL Blood Products)       Comments for Plan/Consent:  Tap post-op, if open procedure will use exparel.                  Av Espinoza MD

## 2020-12-04 NOTE — OR NURSING
Discharge teaching completed with mother and aunt via speaker phone. Enoxaparin teaching completed and all questioned answered. Phone numbers given in questions when home.

## 2020-12-04 NOTE — ANESTHESIA PROCEDURE NOTES
Peripheral Nerve Block Procedure Note      Staff -   Anesthesiologist:  Guerda Greenberg MD  Performed By: anesthesiologist  Location: OR AFTER induction  Procedure Start/Stop TImes:      12/4/2020 12:05 PM     12/4/2020 12:15 PM    patient identified, IV checked, site marked, risks and benefits discussed, informed consent, monitors and equipment checked, pre-op evaluation, at physician/surgeon's request and post-op pain management      Correct Patient: Yes      Correct Position: Yes      Correct Site: Yes      Correct Procedure: Yes      Correct Laterality:  Yes    Site Marked:  Yes  Procedure details:     Procedure:  TAP    ASA:  2    Diagnosis:  Postoperative pain relief    Laterality:  Bilateral    Position:  Supine    Sterile Prep: chloraprep, mask and sterile gloves      Local skin infiltration:  None    Needle:  Insulated    Needle gauge:  21    Needle length (mm):  110    Ultrasound: Yes      Ultrasound used to identify targeted nerve, plexus, or vascular structure and placed a needle adjacent to it      Permanent Image entered into patiient's record      Abnormal pain on injection: No      Blood Aspirated: No      Paresthesias:  No    Bleeding at site: No      Bolus via:  Needle    Infusion Method:  Single Shot    Complications:  None  Assessment/Narrative:     Injection made incrementally with aspirations every (mL):  5

## 2020-12-05 NOTE — BRIEF OP NOTE
[facility]     Brief Operative Note    Pre-operative diagnosis: Intra-abdominal adhesions    Post-operative diagnosis:  Intra-abdominal adhesions     Procedure:     2.)Laparoscopic Lysis of Intraabdominal adhesions      Surgeon: Surgeon(s) and Role:  Panel 1:     * Purnima Chapman MD - Primary     * Percy Resendiz MD - Assisting     * Betty Zafar MD - Assisting     * Mickey Victoria MD - Resident - Assisting  Panel 2:     * Percy Resendiz MD - Primary     Anesthesia: Combined General with Block     Specimens:   ID Type Source Tests Collected by Time Destination   A :  Tissue Uterus, Cervix, Right Fallopian Tube SURGICAL PATHOLOGY EXAM Purnima Chapman MD 12/4/2020 10:57 AM      Findings:   Intra-abdominal adhesions    Complications: None noted    Implants: None    Percy Resendiz MD, PhD

## 2020-12-05 NOTE — OP NOTE
[facility]  SURGICAL OPERATIVE REPORT    Date of surgery:  12/04/20    Surgeon:  Purnima Chapman MD  Assistants:  Betty Zafar MD--The assistance of this surgeon was required due to the need for additional skilled surgical hands to retract and hold instruments due to severe bowel adhesions and endometriosis  Cal Victoria MD PGY4    Pre-operative diagnosis:  Endometriosis, fibroids, AUB  Post-perative diagnosis:  Same, s/p below listed procedure    Procedure:    - Total laparoscopic hysterectomy, right salpingectomy, lysis of adhesions, cystoscopy  - Lysis of adhesions (general surgery)    Anesthesia:  GETA w/ block  Specimen:  Uterus, cervix, right fallopian tube  Drains:  None  EBL:  50 mL   IVF:  1300 mL crystalloid  UOP:  400 mL clear yellow    Findings:  EUA revealed retroverted uterus, somewhat limited mobility. No adnexal masses noted. External genitalia, vaginal vault and cervix appeared normal. On laparoscopy, atraumatic entry and normal upper abdominal survey. Dense adhesions from colon to posterior uterine serosa and left ovary. Adhesions from small bowel to right anterior cul de sac and right ovary. Bilateral ovaries frozen in posterior cul-de-sac. Rupture of left ovarian cyst for chocolate colored fluid, consistent with endometriosis. Uterus with multiple subserosal fibroids. Surgically absent left fallopian tube. Normal appearing right fallopian tube. Bilateral efflux from UOs on cystoscopy. No evidence of bladder injury. Hemostatic surgical sites at end of case    Indication:  Ms Vincent is a 40 year old female who presented for surgical management of the above listed problems. Risks, benefits, alternatives to the procedure were discussed. Consent was signed.    Procedure:  The patient was taken to the OR where general endotracheal anesthesia was administered without complication. Ancef was given. She was placed in a dorsal lithotomy position using yellow fin  anmol. Exam under anesthesia revealed the above listed findings. She was then prepped and draped in usual sterile fashion. After time out was completed, a disla catheter was placed in the bladder and V-Care uterine manipulator was placed. Attention was then turned towards the abdomen.  A 5 mm stab incision was made in the umbilicus, and a 5 mm port was introduced into the abdomen using the Visiport.  CO2 gas was connected with good opening pressure.  Abdominal survey as above.  Next, another 5 mm port was placed in the right lower quadrant, 2 cm superior and medial to the ASIS.  A third 5 mm port was placed in similar fashion in the left lower quadrant.  The patient was placed in steep Trendelenburg.  The bowel was attempted to be swept out of the pelvis, but we were unable to do so given dense adhesions.  Adhesions from the bowel to the posterior uterine serosa were partially taken down with sharp dissection, allowing for better visualization of pelvic anatomy and for better mobility of the uterus. The small bowel was taken down from the right anterior cul de sac so right ovary could be seen and bowel dissected from right posterior uterus and right ovary.  At this point, decision was made to consult general surgery to assist with lysis of adhesions given the extent of dense adhesions to the colon on patient left side.  While waiting for general surgery to arrive, attention was turned to the hysterectomy.  First the right fallopian tube was transected at the cornua and dissected off of the underlying mesosalpinx with the gyrus.  This dissection was carried out to the fimbriated end and the fallopian tube was removed through the left 5 mm port.  Next, the right round ligament was cauterized, cut, and opened.  The right ureter was identified retroperitoneally.  The anterior and the posterior leaves of the broad ligament were opened, and the dissection was continued down towards the bladder.  At this point a bladder  flap was made by dissecting off the vesicouterine peritoneum from the anterior uterine serosa.  Next the right uterine artery was identified and cauterized.  Attention was turned to the left side.  The sigmoid colon was mobilized from the sidewall to allow for visualization of the ureter. Left ureterolysis was done to show location of the ureter to the level of the cervix.  The left round ligament was cauterized cut and opened in a similar fashion to the right side.  The anterior leaf of the broad ligament was taken down towards the bladder, and the remainder of the bladder flap was created.  The left uterine artery was identified and similarly cauterized.  At this point, the uterus appeared blanched.  Next, general surgery performed their portion of the case, and successfully dissected the colon off of the posterior uterine serosa, past the level of the Vcare cup.  The left ovary was similarly adhered to the colon, and given the risk of the dissection, it was left on the colon.  Of note, we believe to have taken the left IP ligament during our dissection.  The right ovary was also frozen in the pelvis and not dissected out.  Blood supply remained intact.  Please see the general surgery note for details of their portion of the case.  Next, attention was returned to the uterine arteries, which are once again cauterized and cut bilaterally until they fell away from the cervix laterally.  Once we had obtained adequate exposure of the Vcare cup, the colpotomy was performed using the bipolar spatula.  The uterus and cervix were removed from the vagina.    Attention was turned to the vagina, and the vaginal cuff was closed with several figure of X sutures of 0 Vicryl.  A cystoscopy was performed with findings as above.  A final abdominal survey revealed hemostatic surgical sites.  A piece of Interceed was also placed in the pelvis.  At this point, instruments were removed from the abdomen and CO2 gas was expressed through  the open ports and several positive pressure breaths.  The ports were removed and the incisions were closed with Exophin. Instrument and sharps count correct x2. The patient tolerated the procedure well. She was awoken from anesthesia without difficulty and was transported to PACU in a stable condition.  Dr. Chapman was scrubbed and present for the entire duration of the procedure.      Cal Victoria MD  Obstetrics & Gynecology, PGY4    I was present and scrubbed for entirety of the surgical case and  agree with note as edited to reflect findings.      TIFFANY CHAPMAN MD

## 2020-12-05 NOTE — OP NOTE
Procedure Date: 12/04/2020      PREOPERATIVE DIAGNOSIS:  Intra-abdominal adhesions.      POSTOPERATIVE DIAGNOSIS:  Intra-abdominal adhesions.      PROCEDURE:  Lysis of adhesions, laparoscopic.      STAFF SURGEON:  Percy Resendiz MD, PhD      RESIDENT STUDENT:  Panchito Arthur MD      CLINICAL HISTORY:  The patient is a 40-year-old woman who presents for a hysterectomy for multiple uterine fibroids was found to have dense adhesions between the colon and the uterus.  The general Surgery service was contacted for an intraoperative consultation.  The laparoscopic procedure was underway at the time of arrival of the general Surgery service to the OR.  The General Surgery service was requested to scrub in and participate in the lysis of the dense adhesions between the colon and the uterus.          DESCRIPTION OF PROCEDURE:  The patient was in the operating room under general anesthetic, and a laparoscopic procedure was underway by the GYN surgical service.  An intraoperative consultation was requested to General Surgery for the purpose of lysis of adhesions, as the colon was densely connected to the uterus by dense scar in a fashion that precluded ready lysis of adhesions without the potential for injury to the colon.  For this reason, the General Surgery Service was consulted.  The members of the General Surgery Service scrubbed in.  The GYN Surgery Service stayed with the patient in the operating room during this procedure, the members of the General Surgery Service then proceeded with lysis of adhesions.  Sharp dissection, electrocautery and blunt dissection was used to take down the dense adhesions of the sigmoid colon to the surface of the uterus, which was very firmly attached.  The colon was also resected away from the ovary, which was also adherent to the colon, and the peritoneal reflection, representing the white line of Toldt, was taken down with electrocautery and blunt dissection as well as sharp  dissection.  The ureter was identified and kept away from the field of dissection, and the colon was then carefully inspected.  No injury was noted to the colon.  There were chocolate cysts present within the ovary that were part and parcel of the dissection.  This area was then carefully evaluated by the GYN Service, and they were able to proceed with the hysterectomy following lysis of adhesions between the colon and the uterus.  The members of the General Surgery Service then scrubbed, out and the GYN Service then proceeded to complete the procedure.         ANNE-MARIE CALDWELL MD             D: 2020   T: 2020   MT: SOLOMON      Name:     RAFITA POWELL   MRN:      [id number]        Account:        [id number]   :      1980           Procedure Date: 2020      Document: [id number]

## 2020-12-09 ENCOUNTER — TELEPHONE (OUTPATIENT)
Dept: OBGYN | Facility: CLINIC | Age: 40
End: 2020-12-09

## 2020-12-09 DIAGNOSIS — R30.0 DYSURIA: ICD-10-CM

## 2020-12-09 DIAGNOSIS — R30.0 DYSURIA: Primary | ICD-10-CM

## 2020-12-09 LAB
ALBUMIN UR-MCNC: NEGATIVE MG/DL
APPEARANCE UR: CLEAR
BACTERIA #/AREA URNS HPF: ABNORMAL /HPF
BILIRUB UR QL STRIP: NEGATIVE
COLOR UR AUTO: YELLOW
GLUCOSE UR STRIP-MCNC: NEGATIVE MG/DL
HGB UR QL STRIP: ABNORMAL
KETONES UR STRIP-MCNC: NEGATIVE MG/DL
LEUKOCYTE ESTERASE UR QL STRIP: ABNORMAL
NITRATE UR QL: NEGATIVE
NON-SQ EPI CELLS #/AREA URNS LPF: ABNORMAL /LPF
PH UR STRIP: 6 PH (ref 5–7)
RBC #/AREA URNS AUTO: ABNORMAL /HPF
SOURCE: ABNORMAL
SP GR UR STRIP: 1.01 (ref 1–1.03)
UROBILINOGEN UR STRIP-ACNC: 0.2 EU/DL (ref 0.2–1)
WBC #/AREA URNS AUTO: ABNORMAL /HPF

## 2020-12-09 PROCEDURE — 87086 URINE CULTURE/COLONY COUNT: CPT | Performed by: OBSTETRICS & GYNECOLOGY

## 2020-12-09 PROCEDURE — 81001 URINALYSIS AUTO W/SCOPE: CPT | Performed by: OBSTETRICS & GYNECOLOGY

## 2020-12-09 NOTE — TELEPHONE ENCOUNTER
Pt called back.  Advised lab only visit.  Scheduled for later today at [facility].  Karlee Cheng RN

## 2020-12-09 NOTE — TELEPHONE ENCOUNTER
Reason for call:  Patient reporting a symptom    Symptom or request: Burning during urination, possible UTI    Duration (how long have symptoms been present): 2 days    Have you been treated for this before? No    Additional comments: Patient stated last week she had surgery with Dr. Chapman. She is concerned she has a UTI due to burning during urination. She asked for a call back from a nurse to discuss further.    Phone Number patient can be reached at:  Home number on file [phone] (home)    Best Time:  Anytime    Can we leave a detailed message on this number:  YES    Call taken on 12/9/2020 at 9:41 AM by Dominique Austin

## 2020-12-10 LAB
BACTERIA SPEC CULT: NORMAL
Lab: NORMAL
SPECIMEN SOURCE: NORMAL

## 2020-12-11 ENCOUNTER — TELEPHONE (OUTPATIENT)
Dept: OBGYN | Facility: CLINIC | Age: 40
End: 2020-12-11

## 2020-12-11 ENCOUNTER — APPOINTMENT (OUTPATIENT)
Dept: CT IMAGING | Facility: CLINIC | Age: 40
DRG: 871 | End: 2020-12-11
Attending: STUDENT IN AN ORGANIZED HEALTH CARE EDUCATION/TRAINING PROGRAM
Payer: COMMERCIAL

## 2020-12-11 ENCOUNTER — HOSPITAL ENCOUNTER (INPATIENT)
Facility: CLINIC | Age: 40
LOS: 2 days | Discharge: HOME | DRG: 871 | End: 2020-12-13
Attending: STUDENT IN AN ORGANIZED HEALTH CARE EDUCATION/TRAINING PROGRAM | Admitting: PEDIATRICS
Payer: COMMERCIAL

## 2020-12-11 DIAGNOSIS — L70.0 CYSTIC ACNE: ICD-10-CM

## 2020-12-11 DIAGNOSIS — Z20.828 EXPOSURE TO SARS-ASSOCIATED CORONAVIRUS: ICD-10-CM

## 2020-12-11 DIAGNOSIS — K65.1 POSTPROCEDURAL INTRAABDOMINAL ABSCESS (H): ICD-10-CM

## 2020-12-11 DIAGNOSIS — T81.43XA POSTPROCEDURAL INTRAABDOMINAL ABSCESS (H): ICD-10-CM
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
## 2020-12-11 LAB
ALBUMIN SERPL-MCNC: 3.2 G/DL (ref 3.4–5)
ALP SERPL-CCNC: 67 U/L (ref 40–150)
ALT SERPL W P-5'-P-CCNC: 34 U/L (ref 0–50)
ANION GAP SERPL CALCULATED.3IONS-SCNC: 6 MMOL/L (ref 3–14)
APTT PPP: 36 SEC (ref 22–37)
AST SERPL W P-5'-P-CCNC: 17 U/L (ref 0–45)
BASOPHILS # BLD AUTO: 0 10E9/L (ref 0–0.2)
BASOPHILS NFR BLD AUTO: 0.2 %
BILIRUB SERPL-MCNC: 1.5 MG/DL (ref 0.2–1.3)
BUN SERPL-MCNC: 6 MG/DL (ref 7–30)
CALCIUM SERPL-MCNC: 8.7 MG/DL (ref 8.5–10.1)
CHLORIDE SERPL-SCNC: 105 MMOL/L (ref 94–109)
CO2 SERPL-SCNC: 28 MMOL/L (ref 20–32)
COPATH REPORT: NORMAL
CREAT SERPL-MCNC: 0.7 MG/DL (ref 0.52–1.04)
DIFFERENTIAL METHOD BLD: ABNORMAL
EOSINOPHIL # BLD AUTO: 0.1 10E9/L (ref 0–0.7)
EOSINOPHIL NFR BLD AUTO: 0.3 %
ERYTHROCYTE [DISTWIDTH] IN BLOOD BY AUTOMATED COUNT: 14.7 % (ref 10–15)
GFR SERPL CREATININE-BSD FRML MDRD: >90 ML/MIN/{1.73_M2}
GLUCOSE SERPL-MCNC: 128 MG/DL (ref 70–99)
HCT VFR BLD AUTO: 27.4 % (ref 35–47)
HGB BLD-MCNC: 8.5 G/DL (ref 11.7–15.7)
IMM GRANULOCYTES # BLD: 0.2 10E9/L (ref 0–0.4)
IMM GRANULOCYTES NFR BLD: 0.8 %
INR PPP: 1.24 (ref 0.86–1.14)
LACTATE BLD-SCNC: 1 MMOL/L (ref 0.7–2)
LYMPHOCYTES # BLD AUTO: 0.9 10E9/L (ref 0.8–5.3)
LYMPHOCYTES NFR BLD AUTO: 4.4 %
MCH RBC QN AUTO: 26.7 PG (ref 26.5–33)
MCHC RBC AUTO-ENTMCNC: 31 G/DL (ref 31.5–36.5)
MCV RBC AUTO: 86 FL (ref 78–100)
MONOCYTES # BLD AUTO: 1 10E9/L (ref 0–1.3)
MONOCYTES NFR BLD AUTO: 5.2 %
NEUTROPHILS # BLD AUTO: 17.6 10E9/L (ref 1.6–8.3)
NEUTROPHILS NFR BLD AUTO: 89.1 %
NRBC # BLD AUTO: 0 10*3/UL
NRBC BLD AUTO-RTO: 0 /100
PLATELET # BLD AUTO: 376 10E9/L (ref 150–450)
POTASSIUM SERPL-SCNC: 3.4 MMOL/L (ref 3.4–5.3)
PROT SERPL-MCNC: 7.8 G/DL (ref 6.8–8.8)
RBC # BLD AUTO: 3.18 10E12/L (ref 3.8–5.2)
SARS-COV-2 RNA SPEC QL NAA+PROBE: NORMAL
SODIUM SERPL-SCNC: 139 MMOL/L (ref 133–144)
SPECIMEN SOURCE: NORMAL
WBC # BLD AUTO: 19.8 10E9/L (ref 4–11)

## 2020-12-11 PROCEDURE — 258N000003 HC RX IP 258 OP 636: Performed by: PHYSICIAN ASSISTANT

## 2020-12-11 PROCEDURE — 250N000011 HC RX IP 250 OP 636: Performed by: OBSTETRICS & GYNECOLOGY

## 2020-12-11 PROCEDURE — 87040 BLOOD CULTURE FOR BACTERIA: CPT | Performed by: STUDENT IN AN ORGANIZED HEALTH CARE EDUCATION/TRAINING PROGRAM

## 2020-12-11 PROCEDURE — 86850 RBC ANTIBODY SCREEN: CPT | Performed by: STUDENT IN AN ORGANIZED HEALTH CARE EDUCATION/TRAINING PROGRAM

## 2020-12-11 PROCEDURE — 250N000011 HC RX IP 250 OP 636: Performed by: STUDENT IN AN ORGANIZED HEALTH CARE EDUCATION/TRAINING PROGRAM

## 2020-12-11 PROCEDURE — 96375 TX/PRO/DX INJ NEW DRUG ADDON: CPT | Performed by: STUDENT IN AN ORGANIZED HEALTH CARE EDUCATION/TRAINING PROGRAM

## 2020-12-11 PROCEDURE — C9803 HOPD COVID-19 SPEC COLLECT: HCPCS | Performed by: STUDENT IN AN ORGANIZED HEALTH CARE EDUCATION/TRAINING PROGRAM

## 2020-12-11 PROCEDURE — 74177 CT ABD & PELVIS W/CONTRAST: CPT

## 2020-12-11 PROCEDURE — U0003 INFECTIOUS AGENT DETECTION BY NUCLEIC ACID (DNA OR RNA); SEVERE ACUTE RESPIRATORY SYNDROME CORONAVIRUS 2 (SARS-COV-2) (CORONAVIRUS DISEASE [COVID-19]), AMPLIFIED PROBE TECHNIQUE, MAKING USE OF HIGH THROUGHPUT TECHNOLOGIES AS DESCRIBED BY CMS-2020-01-R: HCPCS | Performed by: STUDENT IN AN ORGANIZED HEALTH CARE EDUCATION/TRAINING PROGRAM

## 2020-12-11 PROCEDURE — 99285 EMERGENCY DEPT VISIT HI MDM: CPT | Performed by: STUDENT IN AN ORGANIZED HEALTH CARE EDUCATION/TRAINING PROGRAM

## 2020-12-11 PROCEDURE — 85730 THROMBOPLASTIN TIME PARTIAL: CPT | Performed by: STUDENT IN AN ORGANIZED HEALTH CARE EDUCATION/TRAINING PROGRAM

## 2020-12-11 PROCEDURE — 99207 PR APP CREDIT; MD BILLING SHARED VISIT: CPT | Performed by: PHYSICIAN ASSISTANT

## 2020-12-11 PROCEDURE — 85610 PROTHROMBIN TIME: CPT | Performed by: STUDENT IN AN ORGANIZED HEALTH CARE EDUCATION/TRAINING PROGRAM

## 2020-12-11 PROCEDURE — 250N000009 HC RX 250: Performed by: OBSTETRICS & GYNECOLOGY

## 2020-12-11 PROCEDURE — 96376 TX/PRO/DX INJ SAME DRUG ADON: CPT | Performed by: STUDENT IN AN ORGANIZED HEALTH CARE EDUCATION/TRAINING PROGRAM

## 2020-12-11 PROCEDURE — 120N000002 HC R&B MED SURG/OB UMMC

## 2020-12-11 PROCEDURE — 258N000003 HC RX IP 258 OP 636: Performed by: STUDENT IN AN ORGANIZED HEALTH CARE EDUCATION/TRAINING PROGRAM

## 2020-12-11 PROCEDURE — 250N000013 HC RX MED GY IP 250 OP 250 PS 637: Performed by: PHYSICIAN ASSISTANT

## 2020-12-11 PROCEDURE — 86901 BLOOD TYPING SEROLOGIC RH(D): CPT | Performed by: STUDENT IN AN ORGANIZED HEALTH CARE EDUCATION/TRAINING PROGRAM

## 2020-12-11 PROCEDURE — 99223 1ST HOSP IP/OBS HIGH 75: CPT | Mod: AI | Performed by: PEDIATRICS

## 2020-12-11 PROCEDURE — 86923 COMPATIBILITY TEST ELECTRIC: CPT | Performed by: STUDENT IN AN ORGANIZED HEALTH CARE EDUCATION/TRAINING PROGRAM

## 2020-12-11 PROCEDURE — 86900 BLOOD TYPING SEROLOGIC ABO: CPT | Performed by: STUDENT IN AN ORGANIZED HEALTH CARE EDUCATION/TRAINING PROGRAM

## 2020-12-11 PROCEDURE — 96374 THER/PROPH/DIAG INJ IV PUSH: CPT | Mod: 59 | Performed by: STUDENT IN AN ORGANIZED HEALTH CARE EDUCATION/TRAINING PROGRAM

## 2020-12-11 PROCEDURE — 96365 THER/PROPH/DIAG IV INF INIT: CPT | Mod: 59 | Performed by: STUDENT IN AN ORGANIZED HEALTH CARE EDUCATION/TRAINING PROGRAM

## 2020-12-11 PROCEDURE — 99285 EMERGENCY DEPT VISIT HI MDM: CPT | Mod: 25 | Performed by: STUDENT IN AN ORGANIZED HEALTH CARE EDUCATION/TRAINING PROGRAM

## 2020-12-11 PROCEDURE — 85025 COMPLETE CBC W/AUTO DIFF WBC: CPT | Performed by: STUDENT IN AN ORGANIZED HEALTH CARE EDUCATION/TRAINING PROGRAM

## 2020-12-11 PROCEDURE — 80053 COMPREHEN METABOLIC PANEL: CPT | Performed by: STUDENT IN AN ORGANIZED HEALTH CARE EDUCATION/TRAINING PROGRAM

## 2020-12-11 PROCEDURE — 83605 ASSAY OF LACTIC ACID: CPT | Performed by: STUDENT IN AN ORGANIZED HEALTH CARE EDUCATION/TRAINING PROGRAM

## 2020-12-11 PROCEDURE — 96361 HYDRATE IV INFUSION ADD-ON: CPT | Performed by: STUDENT IN AN ORGANIZED HEALTH CARE EDUCATION/TRAINING PROGRAM

## 2020-12-11 RX ORDER — ACETAMINOPHEN 325 MG/1
650 TABLET ORAL EVERY 4 HOURS PRN
Status: DISCONTINUED | OUTPATIENT
Start: 2020-12-11 | End: 2020-12-13 | Stop reason: HOSPADM

## 2020-12-11 RX ORDER — NALOXONE HYDROCHLORIDE 0.4 MG/ML
0.2 INJECTION, SOLUTION INTRAMUSCULAR; INTRAVENOUS; SUBCUTANEOUS
Status: DISCONTINUED | OUTPATIENT
Start: 2020-12-11 | End: 2020-12-13 | Stop reason: HOSPADM

## 2020-12-11 RX ORDER — PIPERACILLIN SODIUM, TAZOBACTAM SODIUM 4; .5 G/20ML; G/20ML
4.5 INJECTION, POWDER, LYOPHILIZED, FOR SOLUTION INTRAVENOUS ONCE
Status: COMPLETED | OUTPATIENT
Start: 2020-12-11 | End: 2020-12-11

## 2020-12-11 RX ORDER — HYDROMORPHONE HYDROCHLORIDE 1 MG/ML
0.3 INJECTION, SOLUTION INTRAMUSCULAR; INTRAVENOUS; SUBCUTANEOUS
Status: DISCONTINUED | OUTPATIENT
Start: 2020-12-11 | End: 2020-12-13 | Stop reason: HOSPADM

## 2020-12-11 RX ORDER — KETOROLAC TROMETHAMINE 15 MG/ML
15 INJECTION, SOLUTION INTRAMUSCULAR; INTRAVENOUS ONCE
Status: COMPLETED | OUTPATIENT
Start: 2020-12-11 | End: 2020-12-11

## 2020-12-11 RX ORDER — LIDOCAINE 40 MG/G
CREAM TOPICAL
Status: DISCONTINUED | OUTPATIENT
Start: 2020-12-11 | End: 2020-12-13 | Stop reason: HOSPADM

## 2020-12-11 RX ORDER — SODIUM CHLORIDE, SODIUM LACTATE, POTASSIUM CHLORIDE, CALCIUM CHLORIDE 600; 310; 30; 20 MG/100ML; MG/100ML; MG/100ML; MG/100ML
INJECTION, SOLUTION INTRAVENOUS CONTINUOUS
Status: DISCONTINUED | OUTPATIENT
Start: 2020-12-11 | End: 2020-12-11

## 2020-12-11 RX ORDER — ONDANSETRON 2 MG/ML
4 INJECTION INTRAMUSCULAR; INTRAVENOUS EVERY 30 MIN PRN
Status: DISCONTINUED | OUTPATIENT
Start: 2020-12-11 | End: 2020-12-11

## 2020-12-11 RX ORDER — NALOXONE HYDROCHLORIDE 0.4 MG/ML
0.4 INJECTION, SOLUTION INTRAMUSCULAR; INTRAVENOUS; SUBCUTANEOUS
Status: DISCONTINUED | OUTPATIENT
Start: 2020-12-11 | End: 2020-12-13 | Stop reason: HOSPADM

## 2020-12-11 RX ORDER — AMOXICILLIN 250 MG
2 CAPSULE ORAL 2 TIMES DAILY
Status: DISCONTINUED | OUTPATIENT
Start: 2020-12-12 | End: 2020-12-13 | Stop reason: HOSPADM

## 2020-12-11 RX ORDER — ONDANSETRON 4 MG/1
4 TABLET, ORALLY DISINTEGRATING ORAL EVERY 6 HOURS PRN
Status: DISCONTINUED | OUTPATIENT
Start: 2020-12-11 | End: 2020-12-13 | Stop reason: HOSPADM

## 2020-12-11 RX ORDER — ONDANSETRON 2 MG/ML
4 INJECTION INTRAMUSCULAR; INTRAVENOUS EVERY 6 HOURS PRN
Status: DISCONTINUED | OUTPATIENT
Start: 2020-12-11 | End: 2020-12-13 | Stop reason: HOSPADM

## 2020-12-11 RX ORDER — IOPAMIDOL 755 MG/ML
100 INJECTION, SOLUTION INTRAVASCULAR ONCE
Status: COMPLETED | OUTPATIENT
Start: 2020-12-11 | End: 2020-12-11

## 2020-12-11 RX ORDER — SODIUM CHLORIDE, SODIUM LACTATE, POTASSIUM CHLORIDE, CALCIUM CHLORIDE 600; 310; 30; 20 MG/100ML; MG/100ML; MG/100ML; MG/100ML
INJECTION, SOLUTION INTRAVENOUS CONTINUOUS
Status: DISCONTINUED | OUTPATIENT
Start: 2020-12-12 | End: 2020-12-13

## 2020-12-11 RX ORDER — POLYETHYLENE GLYCOL 3350 17 G/17G
17 POWDER, FOR SOLUTION ORAL DAILY PRN
Status: DISCONTINUED | OUTPATIENT
Start: 2020-12-11 | End: 2020-12-13 | Stop reason: HOSPADM

## 2020-12-11 RX ORDER — OXYCODONE HYDROCHLORIDE 5 MG/1
5 TABLET ORAL EVERY 4 HOURS PRN
Status: DISCONTINUED | OUTPATIENT
Start: 2020-12-11 | End: 2020-12-13 | Stop reason: HOSPADM

## 2020-12-11 RX ORDER — PIPERACILLIN SODIUM, TAZOBACTAM SODIUM 4; .5 G/20ML; G/20ML
4.5 INJECTION, POWDER, LYOPHILIZED, FOR SOLUTION INTRAVENOUS EVERY 6 HOURS
Status: DISCONTINUED | OUTPATIENT
Start: 2020-12-12 | End: 2020-12-13 | Stop reason: HOSPADM

## 2020-12-11 RX ORDER — AMOXICILLIN 250 MG
1 CAPSULE ORAL 2 TIMES DAILY
Status: DISCONTINUED | OUTPATIENT
Start: 2020-12-12 | End: 2020-12-13 | Stop reason: HOSPADM

## 2020-12-11 RX ADMIN — SODIUM CHLORIDE, POTASSIUM CHLORIDE, SODIUM LACTATE AND CALCIUM CHLORIDE 1000 ML: 600; 310; 30; 20 INJECTION, SOLUTION INTRAVENOUS at 17:19

## 2020-12-11 RX ADMIN — SODIUM CHLORIDE 60 ML: 9 INJECTION, SOLUTION INTRAVENOUS at 18:26

## 2020-12-11 RX ADMIN — HYDROMORPHONE HYDROCHLORIDE 1 MG: 1 INJECTION, SOLUTION INTRAMUSCULAR; INTRAVENOUS; SUBCUTANEOUS at 20:32

## 2020-12-11 RX ADMIN — KETOROLAC TROMETHAMINE 15 MG: 15 INJECTION, SOLUTION INTRAMUSCULAR; INTRAVENOUS at 20:32

## 2020-12-11 RX ADMIN — HYDROMORPHONE HYDROCHLORIDE 1 MG: 1 INJECTION, SOLUTION INTRAMUSCULAR; INTRAVENOUS; SUBCUTANEOUS at 17:34

## 2020-12-11 RX ADMIN — ONDANSETRON 4 MG: 2 INJECTION INTRAMUSCULAR; INTRAVENOUS at 17:16

## 2020-12-11 RX ADMIN — SODIUM CHLORIDE, POTASSIUM CHLORIDE, SODIUM LACTATE AND CALCIUM CHLORIDE 1000 ML: 600; 310; 30; 20 INJECTION, SOLUTION INTRAVENOUS at 18:10

## 2020-12-11 RX ADMIN — SODIUM CHLORIDE, POTASSIUM CHLORIDE, SODIUM LACTATE AND CALCIUM CHLORIDE: 600; 310; 30; 20 INJECTION, SOLUTION INTRAVENOUS at 20:49

## 2020-12-11 RX ADMIN — IOPAMIDOL 79 ML: 755 INJECTION, SOLUTION INTRAVENOUS at 18:26

## 2020-12-11 RX ADMIN — SODIUM CHLORIDE, POTASSIUM CHLORIDE, SODIUM LACTATE AND CALCIUM CHLORIDE: 600; 310; 30; 20 INJECTION, SOLUTION INTRAVENOUS at 23:39

## 2020-12-11 RX ADMIN — DOCUSATE SODIUM AND SENNOSIDES 2 TABLET: 8.6; 5 TABLET ORAL at 23:47

## 2020-12-11 RX ADMIN — PIPERACILLIN SODIUM AND TAZOBACTAM SODIUM 4.5 G: 4; .5 INJECTION, POWDER, LYOPHILIZED, FOR SOLUTION INTRAVENOUS at 20:10

## 2020-12-11 ASSESSMENT — ENCOUNTER SYMPTOMS
BACK PAIN: 0
HEMATURIA: 1
FACIAL SWELLING: 0
SHORTNESS OF BREATH: 0
HEADACHES: 0
DIZZINESS: 0
NECK PAIN: 0
VOMITING: 0
NAUSEA: 1
LIGHT-HEADEDNESS: 1
WOUND: 0
SORE THROAT: 0
NUMBNESS: 0
WEAKNESS: 0
ABDOMINAL PAIN: 0
DIARRHEA: 0
CHILLS: 1
EYE PAIN: 0
CONSTIPATION: 1
DYSURIA: 1
RHINORRHEA: 0
EYE REDNESS: 0
FEVER: 1
FLANK PAIN: 0
WHEEZING: 0
APPETITE CHANGE: 1
EYE DISCHARGE: 0

## 2020-12-11 ASSESSMENT — MIFFLIN-ST. JEOR: SCORE: 1361.24

## 2020-12-11 NOTE — TELEPHONE ENCOUNTER
Pt was advised to go to ER for her symptoms.  She is reluctant because her temperature decreased, but she did also take tylenol a short while ago.  Advised that she go for eval, likely would needs labs, imaging, to figure out the source of her fever.  She will monitor and go to ED if symptoms continue.  Karlee Cheng RN

## 2020-12-11 NOTE — ED PROVIDER NOTES
ED Provider Note  [facility]      History   No chief complaint on file.    The history is provided by the patient and medical records.     Millicent Celaya is a 40 year old female with a past medical history significant for endometriosis and a provoked PE.  Per the patient's medical record, on 12/4/2020, the patient underwent a total hysterectomy with right salpingectomy and cystoscopy due to her endometriosis, excessive bleeding and clotting and was discharged later that day.  During her procedure, they noted some lower intestine and bowel infused so they had to call the general surgeon.  Yesterday, the patient reports having mild pelvic pain but increased to a 7-8/10 pelvic pain today.  The patient also has associated dull rib pain related to her pelvic pain, chills, lightheadedness and nausea with a decreased appetite.  She has been ambulating less due to the pain.  She has noticed some pinkish color from blood in her urine but has not noticed any yellow or smelly discharge from her vagina.  The patient has also been constipated over the last 3 days but has been passing gas.  She has been taking oxycodone for pain but stopped taking those recently.  She states that she took for over the 6 pills prescribed.  Of note, 3 days ago, the patient had lab work including a urine sample as she was having some dysuria initially after the procedure patient then stopped and came back that she describes a burning sensation while she was urinating.  The lab results including her urine came back normal.  She denies any chest pain, shortness of breath, vision changes, weakness, numbness or tingling.  Due to the symptoms, the patient presented to the ED after discussing with her OB/GYN.  The patient is noted to be febrile in the ED.    Past Medical History  Past Medical History:   Diagnosis Date     Cervical high risk HPV (human papillomavirus) test positive 10/07/2020    10/07/20     Endometriosis       History of blood transfusion 2 years ago    Due in part to taking Xarelto for PE     Menorrhagia     whole life     PE (pulmonary thromboembolism) (H) 2016     Past Surgical History:   Procedure Laterality Date     ABDOMEN SURGERY  8 yrs ago - on file    For endomitriosis     CYSTOSCOPY N/A 12/4/2020    Procedure: Cystoscopy;  Surgeon: Percy Resendiz MD;  Location: [facility] OR     LAPAROSCOPIC HYSTERECTOMY TOTAL, SALPINGECTOMY Right 12/4/2020    Procedure: LAPAROSCOPIC LYSIS OF ADHESIONS,HYSTERECTOMY, TOTAL, LAPAROSCOPIC, WITH RIGHT  SALPINGECTOMY AND CYSTOSCOPY;  Surgeon: Purnima Chapman MD;  Location: [facility] OR     LAPAROSCOPIC LYSIS ADHESIONS N/A 12/4/2020    Procedure: Laparoscopic lysis adhesions;  Surgeon: Percy Resendiz MD;  Location: [facility] OR     LAPAROSCOPIC SALPINGECTOMY  9/23/2010    left side          acetaminophen (TYLENOL) 325 MG tablet       enoxaparin ANTICOAGULANT (LOVENOX ANTICOAGULANT) 40 MG/0.4ML syringe       ibuprofen (ADVIL/MOTRIN) 600 MG tablet       senna-docusate (SENOKOT-S/PERICOLACE) 8.6-50 MG tablet       spironolactone (ALDACTONE) 50 MG tablet       ondansetron (ZOFRAN-ODT) 4 MG ODT tab      No Known Allergies  Family History  Family History   Problem Relation Age of Onset     Diabetes Father      Cerebrovascular Disease Paternal Grandmother      Social History   Social History     Tobacco Use     Smoking status: Never Smoker     Smokeless tobacco: Never Used   Substance Use Topics     Alcohol use: Not Currently     Comment: no drink in several months      Drug use: No      Past medical history, past surgical history, medications, allergies, family history, and social history were reviewed with the patient. No additional pertinent items.       Review of Systems   Constitutional: Positive for appetite change (Decreased), chills and fever.   HENT: Negative for ear pain, facial swelling, rhinorrhea and sore throat.    Eyes: Negative for pain, discharge, redness and visual  disturbance.   Respiratory: Negative for shortness of breath and wheezing.    Cardiovascular: Negative for chest pain.   Gastrointestinal: Positive for constipation and nausea. Negative for abdominal pain, diarrhea and vomiting.   Genitourinary: Positive for dysuria, hematuria and pelvic pain. Negative for flank pain, vaginal bleeding and vaginal discharge.        Negative for foul-smelling vaginal discharge.   Musculoskeletal: Negative for back pain and neck pain.        Positive for dull rib pain.   Skin: Negative for rash and wound.   Neurological: Positive for light-headedness. Negative for dizziness, weakness, numbness and headaches.        Negative for tingling.   All other systems reviewed and are negative.    A complete review of systems was performed with pertinent positives and negatives noted in the HPI, and all other systems negative.    Physical Exam   BP: 135/73  Pulse: 140  Temp: 103.5  F (39.7  C)  Resp: 16  SpO2: 98 %  Physical Exam  Constitutional:       General: She is not in acute distress.     Appearance: Normal appearance. She is not diaphoretic.   HENT:      Head: Normocephalic and atraumatic.      Nose: Nose normal.      Mouth/Throat:      Mouth: Mucous membranes are moist.      Pharynx: Oropharynx is clear. No oropharyngeal exudate.   Eyes:      General: Lids are normal. No scleral icterus.     Extraocular Movements: Extraocular movements intact.      Conjunctiva/sclera: Conjunctivae normal.      Pupils: Pupils are equal, round, and reactive to light.   Neck:      Musculoskeletal: Full passive range of motion without pain, normal range of motion and neck supple.   Cardiovascular:      Rate and Rhythm: Normal rate and regular rhythm.      Pulses: Normal pulses.      Heart sounds: Normal heart sounds. No murmur. No friction rub. No gallop.    Pulmonary:      Effort: Pulmonary effort is normal. No respiratory distress.      Breath sounds: Normal breath sounds. No stridor. No wheezing, rhonchi  or rales.   Abdominal:      General: Bowel sounds are normal.      Palpations: Abdomen is soft.      Tenderness: There is abdominal tenderness in the suprapubic area. There is guarding. There is no rebound.      Comments: Well healing surgical scars, to LLQ and RLQ bilaterally approximately 2 cm in length, no noted erythema, crepitance, fluctuance or drainage.     Significant suprapubic tenderness   Musculoskeletal: Normal range of motion.         General: No tenderness.   Skin:     General: Skin is warm and dry.      Capillary Refill: Capillary refill takes less than 2 seconds.      Findings: No rash.   Neurological:      General: No focal deficit present.      Mental Status: She is oriented to person, place, and time. Mental status is at baseline.      GCS: GCS eye subscore is 4. GCS verbal subscore is 5. GCS motor subscore is 6.   Psychiatric:         Attention and Perception: Attention normal.         Mood and Affect: Mood normal.         Speech: Speech normal.         Behavior: Behavior normal.       ED Course     ED Course as of Dec 11 2037   Fri Dec 11, 2020   1908 Noted anemia, no indication for transfusion presently   Hemoglobin(!): 8.5   1908 Significant leukocytosis   WBC(!): 19.8   1909 Not elevated   Lactic Acid: 1.0   1910 Normal renal function   Creatinine: 0.70   1910 Mild hyperglycemia   Glucose(!): 128   1910 No acute electrolyte abnormality other than hyperglycemia   Comprehensive metabolic panel(!)     Procedures   4:40 PM  The patient was seen and examined by Emmanuel Garcia MD in Room ED05.            Results for orders placed or performed during the hospital encounter of 12/11/20   CT Abdomen Pelvis w Contrast     Status: None    Narrative    CT ABDOMEN PELVIS WITH CONTRAST   12/11/2020 6:31 PM     HISTORY: Abdominal pain, acute, generalized, with fever,  postoperative. Status post hysterectomy 2/2 endometriosis, 7 days PTA,  increased pain, decreased bowel movement and  flatus.    TECHNIQUE:  CT abdomen and pelvis with 79ml isovue 370 IV. Radiation  dose for this scan was reduced using automated exposure control,  adjustment of the mA and/or kV according to patient size, or iterative  reconstruction technique.    COMPARISON: None available    FINDINGS:  Lower chest: Lung bases are clear.    Abdomen/pelvis: No suspicious focal hepatic lesion. The gallbladder is  unremarkable. No main pancreatic ductal dilatation or definite solid  pancreatic mass. No splenomegaly. No adrenal nodules. No radiodense  kidney stones or hydronephrosis in either kidney. Few subcentimeter  hypoattenuating cystic foci predominantly in the right kidney, which  is not characterized, could represent renal cysts.    No abnormally dilated bowel loops. Fecalization of distal small bowel  content, suggesting slow bowel transit. Postsurgical changes of  hysterectomy with mildly complex loculated fluid in the deep pelvis  posterior to the urinary bladder and anterior to the rectum,  containing a few foci of air measuring approximately 4.4 x 4.5 cm in  axial dimensions (series 3 image 161), worrisome for abscess. No free  peritoneal or portal venous gas.    Bones and soft tissues: No suspicious osseous lesion. Foci of gas  within the subcutaneous tissue in the anterior abdominal wall, likely  related to recent surgery.      Impression    IMPRESSION:  1. Postsurgical changes of hysterectomy with mildly complex loculated  pelvic collection in the surgical bed, worrisome for an abscess.  2. Fecalization of distal small bowel content, suggesting slow bowel  transit.     OLIVER BAUM MD   CBC with platelets differential     Status: Abnormal   Result Value Ref Range    WBC 19.8 (H) 4.0 - 11.0 10e9/L    RBC Count 3.18 (L) 3.8 - 5.2 10e12/L    Hemoglobin 8.5 (L) 11.7 - 15.7 g/dL    Hematocrit 27.4 (L) 35.0 - 47.0 %    MCV 86 78 - 100 fl    MCH 26.7 26.5 - 33.0 pg    MCHC 31.0 (L) 31.5 - 36.5 g/dL    RDW 14.7 10.0 -  15.0 %    Platelet Count 376 150 - 450 10e9/L    Diff Method Automated Method     % Neutrophils 89.1 %    % Lymphocytes 4.4 %    % Monocytes 5.2 %    % Eosinophils 0.3 %    % Basophils 0.2 %    % Immature Granulocytes 0.8 %    Nucleated RBCs 0 0 /100    Absolute Neutrophil 17.6 (H) 1.6 - 8.3 10e9/L    Absolute Lymphocytes 0.9 0.8 - 5.3 10e9/L    Absolute Monocytes 1.0 0.0 - 1.3 10e9/L    Absolute Eosinophils 0.1 0.0 - 0.7 10e9/L    Absolute Basophils 0.0 0.0 - 0.2 10e9/L    Abs Immature Granulocytes 0.2 0 - 0.4 10e9/L    Absolute Nucleated RBC 0.0    Comprehensive metabolic panel     Status: Abnormal   Result Value Ref Range    Sodium 139 133 - 144 mmol/L    Potassium 3.4 3.4 - 5.3 mmol/L    Chloride 105 94 - 109 mmol/L    Carbon Dioxide 28 20 - 32 mmol/L    Anion Gap 6 3 - 14 mmol/L    Glucose 128 (H) 70 - 99 mg/dL    Urea Nitrogen 6 (L) 7 - 30 mg/dL    Creatinine 0.70 0.52 - 1.04 mg/dL    GFR Estimate >90 >60 mL/min/[1.73_m2]    GFR Estimate If Black >90 >60 mL/min/[1.73_m2]    Calcium 8.7 8.5 - 10.1 mg/dL    Bilirubin Total 1.5 (H) 0.2 - 1.3 mg/dL    Albumin 3.2 (L) 3.4 - 5.0 g/dL    Protein Total 7.8 6.8 - 8.8 g/dL    Alkaline Phosphatase 67 40 - 150 U/L    ALT 34 0 - 50 U/L    AST 17 0 - 45 U/L   Lactic acid whole blood     Status: None   Result Value Ref Range    Lactic Acid 1.0 0.7 - 2.0 mmol/L   INR     Status: Abnormal   Result Value Ref Range    INR 1.24 (H) 0.86 - 1.14   Partial thromboplastin time     Status: None   Result Value Ref Range    PTT 36 22 - 37 sec   ABO/Rh type and screen     Status: None   Result Value Ref Range    ABO AB     RH(D) Pos     Antibody Screen Neg     Test Valid Only At          [facility]    Specimen Expires 12/14/2020    Blood culture     Status: None (Preliminary result)    Specimen: Peripheral Blood    Unspecified Site   Result Value Ref Range    Specimen Description Blood Unspecified Site     Culture Micro PENDING       Medications   lactated ringers BOLUS 1,000 mL (0 mLs Intravenous Stopped 12/11/20 1809)     Followed by   lactated ringers BOLUS 1,000 mL (1,000 mLs Intravenous New Bag 12/11/20 1810)     Followed by   lactated ringers infusion (has no administration in time range)   ondansetron (ZOFRAN) injection 4 mg (4 mg Intravenous Given 12/11/20 1716)   piperacillin-tazobactam (ZOSYN) 4.5 g vial to attach to  mL bag (4.5 g Intravenous New Bag 12/11/20 2010)   lactated ringers infusion (has no administration in time range)   HYDROmorphone (DILAUDID) injection 1 mg (1 mg Intravenous Given 12/11/20 1734)   iopamidol (ISOVUE-370) solution 100 mL (79 mLs Intravenous Given 12/11/20 1826)   sodium chloride 0.9 % bag 500mL for CT scan flush use (60 mLs Intravenous Given 12/11/20 1826)   HYDROmorphone (DILAUDID) injection 1 mg (1 mg Intravenous Given 12/11/20 2032)   ketorolac (TORADOL) injection 15 mg (15 mg Intravenous Given 12/11/20 2032)        Assessments & Plan (with Medical Decision Making)   This is a 40 year old lady who presents to the ED for abdominal pain. Given her history and examination my suspicion is that this pain is secondary to postoperative infection s/p hysterectomy. Other possible etiologies include appendicitis, cholecystitis, PUD, constipation, pancreatitis, diverticulitis, bowel obstruction or perforation, mesenteric ischemia, kidney stone, AAA, ovarian torsion, ovarian cyst or cyst rupture.   Evaluation and management will include CBC, CMP, type and screen, lactate, blood cx, PT/INR, as well as symptom management. Disposition pending results and patient course.     Blood work shows difficult leukocytosis, significant anemia, however at present does not meet criteria for transfusion.  Lactate is within normal limits, patient has normal renal function, moderate hyperglycemia, no acute electrode abnormality requiring acute correction.    CT is concerning for loculations in the pelvis, concerning for  postoperative abscess.    Initiated patient on IV Zosyn.    I spoke with Gyn, given the patient's complicated surgical course, they consulted with general surgery, general surgery feels that the patient is a candidate for interventional radiology, have requested that we admit patient to internal medicine, with interventional radiology, general surgery, gynecology consulting.    Medicine is happy to take the patient and she will be admitted to [facility] for further care.    I have reviewed the nursing notes. I have reviewed the findings, diagnosis, plan and need for follow up with the patient.    New Prescriptions    No medications on file       Final diagnoses:   Postprocedural intraabdominal abscess       --  I, Eitan Khalil, am serving as a trained medical scribe to document services personally performed by Emmanuel Garcia MD, based on the provider's statements to me.     I, Emmanuel Garcia MD, was physically present and have reviewed and verified the accuracy of this note documented by Eitan Khalil.    Emmanuel Garcia MD  [facility] EMERGENCY DEPARTMENT  12/11/2020     Emmanuel Garcia MD  12/11/20 0168

## 2020-12-11 NOTE — TELEPHONE ENCOUNTER
Pt is calling with 102 fever, has dull achy pain on both sides of her lower pelvis.  Pain has gotten worse as the morning has gone on.  She's had a low grade fever this week, with the increase happening today.  (had 101 temp x1 yesterday.)  She has been alternating tylenol and ibuprofen since surgery.  Karlee Cheng RN

## 2020-12-12 LAB
ALBUMIN SERPL-MCNC: 2.4 G/DL (ref 3.4–5)
ALP SERPL-CCNC: 59 U/L (ref 40–150)
ALT SERPL W P-5'-P-CCNC: 27 U/L (ref 0–50)
ANION GAP SERPL CALCULATED.3IONS-SCNC: 5 MMOL/L (ref 3–14)
AST SERPL W P-5'-P-CCNC: 23 U/L (ref 0–45)
BILIRUB DIRECT SERPL-MCNC: 0.4 MG/DL (ref 0–0.2)
BILIRUB SERPL-MCNC: 1.4 MG/DL (ref 0.2–1.3)
BUN SERPL-MCNC: 7 MG/DL (ref 7–30)
CALCIUM SERPL-MCNC: 8.4 MG/DL (ref 8.5–10.1)
CHLORIDE SERPL-SCNC: 107 MMOL/L (ref 94–109)
CO2 SERPL-SCNC: 26 MMOL/L (ref 20–32)
CREAT SERPL-MCNC: 0.72 MG/DL (ref 0.52–1.04)
CRP SERPL-MCNC: 180 MG/L (ref 0–8)
ERYTHROCYTE [DISTWIDTH] IN BLOOD BY AUTOMATED COUNT: 15 % (ref 10–15)
GFR SERPL CREATININE-BSD FRML MDRD: >90 ML/MIN/{1.73_M2}
GLUCOSE BLDC GLUCOMTR-MCNC: 102 MG/DL (ref 70–99)
GLUCOSE SERPL-MCNC: 110 MG/DL (ref 70–99)
HCT VFR BLD AUTO: 23.8 % (ref 35–47)
HGB BLD-MCNC: 7 G/DL (ref 11.7–15.7)
HGB BLD-MCNC: 7.4 G/DL (ref 11.7–15.7)
HGB BLD-MCNC: 7.6 G/DL (ref 11.7–15.7)
LABORATORY COMMENT REPORT: NORMAL
LACTATE BLD-SCNC: 0.6 MMOL/L (ref 0.7–2)
MCH RBC QN AUTO: 26 PG (ref 26.5–33)
MCHC RBC AUTO-ENTMCNC: 29.4 G/DL (ref 31.5–36.5)
MCV RBC AUTO: 89 FL (ref 78–100)
PLATELET # BLD AUTO: 314 10E9/L (ref 150–450)
POTASSIUM SERPL-SCNC: 3.3 MMOL/L (ref 3.4–5.3)
POTASSIUM SERPL-SCNC: 3.8 MMOL/L (ref 3.4–5.3)
PROT SERPL-MCNC: 6.3 G/DL (ref 6.8–8.8)
RBC # BLD AUTO: 2.69 10E12/L (ref 3.8–5.2)
SARS-COV-2 RNA SPEC QL NAA+PROBE: NEGATIVE
SODIUM SERPL-SCNC: 138 MMOL/L (ref 133–144)
SPECIMEN SOURCE: NORMAL
WBC # BLD AUTO: 16.6 10E9/L (ref 4–11)

## 2020-12-12 PROCEDURE — 85018 HEMOGLOBIN: CPT | Performed by: INTERNAL MEDICINE

## 2020-12-12 PROCEDURE — 80053 COMPREHEN METABOLIC PANEL: CPT | Performed by: PHYSICIAN ASSISTANT

## 2020-12-12 PROCEDURE — 36415 COLL VENOUS BLD VENIPUNCTURE: CPT | Performed by: INTERNAL MEDICINE

## 2020-12-12 PROCEDURE — 82248 BILIRUBIN DIRECT: CPT | Performed by: PHYSICIAN ASSISTANT

## 2020-12-12 PROCEDURE — 250N000013 HC RX MED GY IP 250 OP 250 PS 637: Performed by: INTERNAL MEDICINE

## 2020-12-12 PROCEDURE — 36415 COLL VENOUS BLD VENIPUNCTURE: CPT | Performed by: PHYSICIAN ASSISTANT

## 2020-12-12 PROCEDURE — 999N001017 HC STATISTIC GLUCOSE BY METER IP

## 2020-12-12 PROCEDURE — 120N000002 HC R&B MED SURG/OB UMMC

## 2020-12-12 PROCEDURE — 86140 C-REACTIVE PROTEIN: CPT | Performed by: PHYSICIAN ASSISTANT

## 2020-12-12 PROCEDURE — 84132 ASSAY OF SERUM POTASSIUM: CPT | Performed by: INTERNAL MEDICINE

## 2020-12-12 PROCEDURE — 258N000003 HC RX IP 258 OP 636: Performed by: PHYSICIAN ASSISTANT

## 2020-12-12 PROCEDURE — 86140 C-REACTIVE PROTEIN: CPT | Performed by: INTERNAL MEDICINE

## 2020-12-12 PROCEDURE — 250N000013 HC RX MED GY IP 250 OP 250 PS 637: Performed by: PHYSICIAN ASSISTANT

## 2020-12-12 PROCEDURE — 250N000011 HC RX IP 250 OP 636: Performed by: PHYSICIAN ASSISTANT

## 2020-12-12 PROCEDURE — 85027 COMPLETE CBC AUTOMATED: CPT | Performed by: PHYSICIAN ASSISTANT

## 2020-12-12 PROCEDURE — 83605 ASSAY OF LACTIC ACID: CPT | Performed by: INTERNAL MEDICINE

## 2020-12-12 PROCEDURE — 99233 SBSQ HOSP IP/OBS HIGH 50: CPT | Performed by: INTERNAL MEDICINE

## 2020-12-12 RX ORDER — POTASSIUM CHLORIDE 750 MG/1
40 TABLET, EXTENDED RELEASE ORAL ONCE
Status: COMPLETED | OUTPATIENT
Start: 2020-12-12 | End: 2020-12-12

## 2020-12-12 RX ADMIN — PIPERACILLIN SODIUM AND TAZOBACTAM SODIUM 4.5 G: 4; .5 INJECTION, POWDER, LYOPHILIZED, FOR SOLUTION INTRAVENOUS at 20:13

## 2020-12-12 RX ADMIN — SODIUM CHLORIDE, POTASSIUM CHLORIDE, SODIUM LACTATE AND CALCIUM CHLORIDE: 600; 310; 30; 20 INJECTION, SOLUTION INTRAVENOUS at 08:22

## 2020-12-12 RX ADMIN — SODIUM CHLORIDE, POTASSIUM CHLORIDE, SODIUM LACTATE AND CALCIUM CHLORIDE: 600; 310; 30; 20 INJECTION, SOLUTION INTRAVENOUS at 18:37

## 2020-12-12 RX ADMIN — DOCUSATE SODIUM AND SENNOSIDES 1 TABLET: 8.6; 5 TABLET ORAL at 20:13

## 2020-12-12 RX ADMIN — PIPERACILLIN SODIUM AND TAZOBACTAM SODIUM 4.5 G: 4; .5 INJECTION, POWDER, LYOPHILIZED, FOR SOLUTION INTRAVENOUS at 13:32

## 2020-12-12 RX ADMIN — PIPERACILLIN SODIUM AND TAZOBACTAM SODIUM 4.5 G: 4; .5 INJECTION, POWDER, LYOPHILIZED, FOR SOLUTION INTRAVENOUS at 08:35

## 2020-12-12 RX ADMIN — DOCUSATE SODIUM AND SENNOSIDES 2 TABLET: 8.6; 5 TABLET ORAL at 08:35

## 2020-12-12 RX ADMIN — POLYETHYLENE GLYCOL 3350 17 G: 17 POWDER, FOR SOLUTION ORAL at 15:45

## 2020-12-12 RX ADMIN — POTASSIUM CHLORIDE 40 MEQ: 750 TABLET, EXTENDED RELEASE ORAL at 11:18

## 2020-12-12 RX ADMIN — PIPERACILLIN SODIUM AND TAZOBACTAM SODIUM 4.5 G: 4; .5 INJECTION, POWDER, LYOPHILIZED, FOR SOLUTION INTRAVENOUS at 03:03

## 2020-12-12 ASSESSMENT — ACTIVITIES OF DAILY LIVING (ADL)
ADLS_ACUITY_SCORE: 17
ADLS_ACUITY_SCORE: 17
ADLS_ACUITY_SCORE: 15
ADLS_ACUITY_SCORE: 17

## 2020-12-12 ASSESSMENT — MIFFLIN-ST. JEOR: SCORE: 1361.7

## 2020-12-12 NOTE — CONSULTS
"[facility] Surgery Consultation    Millicent Celaya MRN# [id number]   Age: 40 year old YOB: 1980     Date of Admission:  12/11/2020    Date of Consult:   12/11/20    Reason for consult: Pelvic abscess, concern for bowel injury       Requesting service: Gold Night; requesting provider: Ceci Izquierdo PA-C                   Assessment and Plan:   Assessment:   40F POD#8 s/p laparoscopic hysterectomy with lysis of adhesions on 12/4/2020 for endometriosis, fibroids, and abnormal uterine bleeding. Now readmitted with pelvic abscess measuring 4.4 cm x 4.5 cm. Currently is clinically stable without indication for surgery.       Plan:   - NPO  - Agree with IR consult for drainage  - IV abx per primary    Patient discussed with chief resident, Dr. Darby, who will discuss with staff, Dr. Pierre.    Thi Saxena MD (PGY-3)  General Surgery Resident            Chief Complaint:     \"It's been a long day\"         History of Present Illness:     40F with history of endometriosis, fibroids, and abnormal uterine bleeding who is POD#8 s/p laparoscopic hysterectomy with lysis of adhesions on 12/4/2020 (GYN + gen surg for NIEVES). Patient was discharged same day to home and had generally been doing ok. On 12/9 she had dysuria and thought she had a UTI. The following day on 12/10 she was febrile to 101 F. Today her fever worsened and she went to the [facility] ED for evaluation. She has noticed feeling constipated with bowel movements over the last several days. Today she had a small BM but not normal caliber. Last normal BM was 12/8. She has been taking stool softeners at home. She is passing flatus. She reports a little vaginal spotting since surgery. She has been tolerating a regular diet, no nausea/vomiting. She has lower abdominal cramping. No chest pain, shortness of breath, lightheadedness, or dizziness.          Past Medical History:     Past Medical History:   Diagnosis Date     Cervical high risk " HPV (human papillomavirus) test positive 10/07/2020    10/07/20     Endometriosis      History of blood transfusion 2 years ago    Due in part to taking Xarelto for PE     Menorrhagia     whole life     PE (pulmonary thromboembolism) (H) 2016             Past Surgical History:     Past Surgical History:   Procedure Laterality Date     ABDOMEN SURGERY  8 yrs ago - on file    For endomitriosis     CYSTOSCOPY N/A 12/4/2020    Procedure: Cystoscopy;  Surgeon: Percy Resendiz MD;  Location: [facility]     LAPAROSCOPIC HYSTERECTOMY TOTAL, SALPINGECTOMY Right 12/4/2020    Procedure: LAPAROSCOPIC LYSIS OF ADHESIONS,HYSTERECTOMY, TOTAL, LAPAROSCOPIC, WITH RIGHT  SALPINGECTOMY AND CYSTOSCOPY;  Surgeon: Purnima Chapman MD;  Location: [facility] OR     LAPAROSCOPIC LYSIS ADHESIONS N/A 12/4/2020    Procedure: Laparoscopic lysis adhesions;  Surgeon: Percy Resendiz MD;  [facility]: [facility] OR     LAPAROSCOPIC SALPINGECTOMY  9/23/2010    left side             Social History:     Social History     Tobacco Use     Smoking status: Never Smoker     Smokeless tobacco: Never Used   Substance Use Topics     Alcohol use: Not Currently     Comment: no drink in several months      Lives alone but currently has been staying with aunt and uncle after surgery.  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          Family History:     Family History   Problem Relation Age of Onset     Diabetes Father      Cerebrovascular Disease Paternal Grandmother         No family h/o bleeding/clotting disorders or problems with anesthesia to her knowledge.          Allergies:   No Known Allergies          Medications:     Current Facility-Administered Medications   Medication     acetaminophen (TYLENOL) tablet 650 mg     HYDROmorphone (PF) (DILAUDID) injection 0.3 mg     lactated ringers infusion     lidocaine (LMX4) cream     lidocaine 1 % 0.1-1 mL     melatonin tablet 1 mg     naloxone (NARCAN) injection 0.2 mg    Or      naloxone (NARCAN) injection 0.4 mg    Or     naloxone (NARCAN) injection 0.2 mg    Or     naloxone (NARCAN) injection 0.4 mg     ondansetron (ZOFRAN-ODT) ODT tab 4 mg    Or     ondansetron (ZOFRAN) injection 4 mg     oxyCODONE (ROXICODONE) tablet 5 mg     piperacillin-tazobactam (ZOSYN) 4.5 g vial to attach to  mL bag     polyethylene glycol (MIRALAX) Packet 17 g     senna-docusate (SENOKOT-S/PERICOLACE) 8.6-50 MG per tablet 1 tablet    Or     senna-docusate (SENOKOT-S/PERICOLACE) 8.6-50 MG per tablet 2 tablet     sodium chloride (PF) 0.9% PF flush 3 mL     sodium chloride (PF) 0.9% PF flush 3 mL               Review of Systems:     See HPI above for pertinent findings.          Physical Exam:   All vitals have been reviewed  Temp:  [97.7  F (36.5  C)-103.5  F (39.7  C)] 97.7  F (36.5  C)  Pulse:  [] 111  Resp:  [16-20] 18  BP: (112-135)/(66-80) 112/66  SpO2:  [94 %-100 %] 96 %    Intake/Output Summary (Last 24 hours) at 12/12/2020 0033  Last data filed at 12/11/2020 1809  Gross per 24 hour   Intake 1000 ml   Output --   Net 1000 ml       Physical Exam:   Gen: Laying in bed, no acute distress, looks comfortable and well nourished  Pulm: Non-labored breathing on room air, no tachypnea  CV: sinus tachycardia, HR 100s  Abd: soft, non-distended, minimally tender to palpation lower abdomen, no peritonitis. Healing surgical lap scars with skin glue, c/d/i no evidence of infection  Extremities: warm, well perfused, no edema          Data:   All laboratory data reviewed    Results:  BMP  Recent Labs   Lab 12/11/20  1711      POTASSIUM 3.4   CHLORIDE 105   CO2 28   BUN 6*   CR 0.70   *     CBC  Recent Labs   Lab 12/11/20  1711   WBC 19.8*   HGB 8.5*        LFT  Recent Labs   Lab 12/11/20  1711   AST 17   ALT 34   ALKPHOS 67   BILITOTAL 1.5*   ALBUMIN 3.2*   INR 1.24*     Recent Labs   Lab 12/11/20  1711   *       Imaging:  CT ABDOMEN PELVIS WITH CONTRAST   12/11/2020 6:31 PM       HISTORY: Abdominal pain, acute, generalized, with fever,  postoperative. Status post hysterectomy 2/2 endometriosis, 7 days PTA,  increased pain, decreased bowel movement and flatus.     TECHNIQUE:  CT abdomen and pelvis with 79ml isovue 370 IV. Radiation  dose for this scan was reduced using automated exposure control,  adjustment of the mA and/or kV according to patient size, or iterative  reconstruction technique.     COMPARISON: None available     FINDINGS:  Lower chest: Lung bases are clear.     Abdomen/pelvis: No suspicious focal hepatic lesion. The gallbladder is  unremarkable. No main pancreatic ductal dilatation or definite solid  pancreatic mass. No splenomegaly. No adrenal nodules. No radiodense  kidney stones or hydronephrosis in either kidney. Few subcentimeter  hypoattenuating cystic foci predominantly in the right kidney, which  is not characterized, could represent renal cysts.     No abnormally dilated bowel loops. Fecalization of distal small bowel  content, suggesting slow bowel transit. Postsurgical changes of  hysterectomy with mildly complex loculated fluid in the deep pelvis  posterior to the urinary bladder and anterior to the rectum,  containing a few foci of air measuring approximately 4.4 x 4.5 cm in  axial dimensions (series 3 image 161), worrisome for abscess. No free  peritoneal or portal venous gas.     Bones and soft tissues: No suspicious osseous lesion. Foci of gas  within the subcutaneous tissue in the anterior abdominal wall, likely  related to recent surgery.                                                                      IMPRESSION:  1. Postsurgical changes of hysterectomy with mildly complex loculated  pelvic collection in the surgical bed, worrisome for an abscess.  2. Fecalization of distal small bowel content, suggesting slow bowel  transit.

## 2020-12-12 NOTE — PROGRESS NOTES
[facility]     Medicine Progress Note - Hospitalist Service, Gold 9       Date of Admission:  12/11/2020  Assessment & Plan      Millicent Celaya is a 40 year old female with history of provoked PE (2016), endometriosis, fibroids, and recent TLH/RS/NIEVES 12/4 who was admitted to [facility] 12/11/2020 with severe sepsis 2/2 intraabdominal abscess      Severe sepsis 2/2 intraabdominal abscess, s/p TLH, RS, NIEVES 12/4/2020: Per gynecology and GS for endometriosis, fibroids, and abnormal uterine bleeding. Vaginal spotting but not heavy bleeding since surgery. Increased pain, fevers starting 12/10. Temp 103.5,  on presentation. WBC 19.8, LA 1.0. CT AP 12/11 4.4x4.5 cm complex fluid collection anterior to rectum c/w abscess, concern for bowel injury given significant bowel dissection due to endometriosis. Seen by gyn and treated with Zosyn in the ED  - Appreciate Gynecology, GS , IR consults  - Continue Zosyn  - Follow cultures   - Pain management: Tylenol and oxycodone prn with IV dilaudid for breakthrough   12/12; DW IR- ct IV Abx for now, no planned IR procedure. Will ADAT  - monitor Hb     H/o PE: 2016 2/2 OCPs. Per gynecology note, would like Lovenox ppx  - Ct to hold Lovenox for now as significant bleeding this am with Hb drop, resume as able     Acute blood loss anemia: BL hgb 10-11. Down to 8.5 12/11 following surgery 12/4. Vaginal spotting noted 12/11, 12/12  - Trend hgb Q8 hr 8.5-->7 . Transfuse for <7 (consent signed)     Constipation: NBowel regimen      Transaminitis: Tbili 1.5, other LFTs normal. No prior bili available  - Trend       Diet: Advance Diet as Tolerated: Clear Liquid Diet    DVT Prophylaxis: Pneumatic Compression Devices  Lee Catheter: not present  Code Status: Full Code           Disposition Plan   Expected discharge: 2 - 3 days, recommended to prior living arrangement once antibiotic plan established and hemoglobin stable.  Entered: Marcos Holbrook  "MD LENA 12/12/2020, 11:23 AM       The patient's care was discussed with the Bedside Nurse, Care Coordinator/, Patient and IR Consultant.    Marcos Holbrook MD, MD  Hospitalist Service, [address]   Contact information available via [facility] Paging/Directory  Please see sign in/sign out for up to date coverage information  ______________________________________________________________________    Interval History   Feels better compared to yesterday. No fever. Abdominal pain is better  Had significant amount of bleeding this morning as per her.   No new bowel/ bladder problems    Data reviewed today: I reviewed all medications, new labs and imaging results over the last 24 hours. I personally reviewed the abdominal CT image(s) showing as noted.    Physical Exam   BP (!) 146/85 (BP Location: Right arm)   Pulse 118   Temp 99.8  F (37.7  C) (Oral)   Resp 18   Ht 1.575 m (5' 2\")   Wt 74.3 kg (163 lb 14.4 oz)   LMP 11/19/2020 (Within Days)   SpO2 97%   BMI 29.98 kg/m    Gen- pleasant  lying in bed  HEENT- NAD, JESÚS, MMM  Neck- supple  CVS- I+II+ no m/r/g  RS- CTAB  Abdo- soft, no tenderness . No g/r/r   Ext- no edema   CNS- no gross focal deficit     Data    BMP  Recent Labs   Lab 12/12/20  0504 12/11/20  1711    139   POTASSIUM 3.3* 3.4   CHLORIDE 107 105   FAN 8.4* 8.7   CO2 26 28   BUN 7 6*   CR 0.72 0.70   * 128*     CBC  Recent Labs   Lab 12/12/20  0504 12/11/20  1711   WBC 16.6* 19.8*   RBC 2.69* 3.18*   HGB 7.0* 8.5*   HCT 23.8* 27.4*   MCV 89 86   MCH 26.0* 26.7   MCHC 29.4* 31.0*   RDW 15.0 14.7    376     INR  Recent Labs   Lab 12/11/20  1711   INR 1.24*     LFTs  Recent Labs   Lab 12/12/20  0504 12/11/20  1711   ALKPHOS 59 67   AST 23 17   ALT 27 34   BILITOTAL 1.4* 1.5*   PROTTOTAL 6.3* 7.8   ALBUMIN 2.4* 3.2*      Inflammatory Markers  No lab results found.      Recent Results (from the past 24 hour(s))   CT Abdomen " Pelvis w Contrast    Narrative    CT ABDOMEN PELVIS WITH CONTRAST   12/11/2020 6:31 PM     HISTORY: Abdominal pain, acute, generalized, with fever,  postoperative. Status post hysterectomy 2/2 endometriosis, 7 days PTA,  increased pain, decreased bowel movement and flatus.    TECHNIQUE:  CT abdomen and pelvis with 79ml isovue 370 IV. Radiation  dose for this scan was reduced using automated exposure control,  adjustment of the mA and/or kV according to patient size, or iterative  reconstruction technique.    COMPARISON: None available    FINDINGS:  Lower chest: Lung bases are clear.    Abdomen/pelvis: No suspicious focal hepatic lesion. The gallbladder is  unremarkable. No main pancreatic ductal dilatation or definite solid  pancreatic mass. No splenomegaly. No adrenal nodules. No radiodense  kidney stones or hydronephrosis in either kidney. Few subcentimeter  hypoattenuating cystic foci predominantly in the right kidney, which  is not characterized, could represent renal cysts.    No abnormally dilated bowel loops. Fecalization of distal small bowel  content, suggesting slow bowel transit. Postsurgical changes of  hysterectomy with mildly complex loculated fluid in the deep pelvis  posterior to the urinary bladder and anterior to the rectum,  containing a few foci of air measuring approximately 4.4 x 4.5 cm in  axial dimensions (series 3 image 161), worrisome for abscess. No free  peritoneal or portal venous gas.    Bones and soft tissues: No suspicious osseous lesion. Foci of gas  within the subcutaneous tissue in the anterior abdominal wall, likely  related to recent surgery.      Impression    IMPRESSION:  1. Postsurgical changes of hysterectomy with mildly complex loculated  pelvic collection in the surgical bed, worrisome for an abscess.  2. Fecalization of distal small bowel content, suggesting slow bowel  transit.     OLIVER BAUM MD

## 2020-12-12 NOTE — H&P
[facility]     History and Physical - Hospitalist Service, Gold Night        Date of Admission: 12/11/2020    Assessment & Plan   Millicent Celaya is a 40 year old female with history of provoked PE (2016), endometriosis, fibroids, and recent TLH/RS/NIEVES 12/4 who was admitted to [facility] 12/11/2020 with severe sepsis 2/2 intraabdominal abscess     Severe sepsis 2/2 intraabdominal abscess, s/p TLH, RS, NIEVES 12/4/2020: Per gynecology and GS for endometriosis, fibroids, and abnormal uterine bleeding. Vaginal spotting but not heavy bleeding since surgery. Increased pain, fevers starting 12/10. Temp 103.5,  on presentation. WBC 19.8, LA 1.0. CT AP 12/11 4.4x4.5 cm complex fluid collection anterior to rectum c/w abscess, concern for bowel injury given significant bowel dissection due to endometriosis. Seen by gyn and treated with Zosyn in the ED  - Gynecology and GS consults  - IR consult for drain placement    - Continue Zosyn  - Follow cultures   - NPO,  ml/hr overnight   - Pain management: Tylenol and oxycodone prn with IV dilaudid for breakthrough     H/o PE: 2016 2/2 OCPs. Per gynecology note, would like Lovenox ppx  - Will hold Lovenox overnight given likely procedures in the am, resume as able    Acute blood loss anemia: BL hgb 10-11. Down to 8.5 12/11 following surgery 12/4. Vaginal spotting noted 12/11  - Trend hgb daily, sooner if acute changes     Constipation: No BM since 12/7, passing gas. Bowel regimen     Transaminitis: Tbili 1.5, other LFTs normal. No prior bili available  - Trend 12/12, obtain direct and indirect at that time       Diet:   NPO  DVT Prophylaxis: Pneumatic Compression Devices overnight, resume Lovenox as able  Lee Catheter: not present  Code Status:   Full Code         Disposition Plan   Expected discharge: few days, recommended to prior living arrangement once SIRS/Sepsis treated.  Entered: Ceci Izquierdo PA-C 12/11/2020, 10:46  PM     The patient's care was discussed with the patient and attending physician, Dr. Paniagua.    CHIDI Lewis [facility]   Contact information available via [facility] Paging/Directory      ______________________________________________________________________    Chief Complaint   Fevers, abdominal pain    History is obtained from the patient and medical record     History of Present Illness   Millicent Celaya is a 40 year old female with history of provoked PE (2016), endometriosis, and fibroids who was admitted to [facility] 12/11/2020 with severe sepsis 2/2 intraabdominal abscess     Patient underwent TLH, RS, and NIEVES 12/4. She discharged home same day. Initially was doing well but started to feel off around Tuesday/Wednesday. Thought she might have a UTI on 12/9 but UA was bland. Spiked fever to 101 12/10 at home. Worsening symptoms today prompting presentation to the ED. Patient reports vaginal spotting with pink colored discharge, denies mucous. Has to change her pad or disposable underwear when she uses the restroom. Currently reports feeling much better following antibiotics and pain medications. She continues to have some lower abdominal cramping. Denies N/V. Passing gas, no BM for a few days. Denies ongoing fever/chills. No chest pain or dyspnea. Denies palpations. No confusion or AMS    Review of Systems    The 10 point Review of Systems is negative other than noted in the HPI or here.     Past Medical History    I have reviewed this patient's medical history and updated it with pertinent information if needed.   Past Medical History:   Diagnosis Date     Cervical high risk HPV (human papillomavirus) test positive 10/07/2020    10/07/20     Endometriosis      History of blood transfusion 2 years ago    Due in part to taking Xarelto for PE     Menorrhagia     whole life     PE (pulmonary thromboembolism) (H) 2016       Past Surgical History   I have  reviewed this patient's surgical history and updated it with pertinent information if needed.  Past Surgical History:   Procedure Laterality Date     ABDOMEN SURGERY  8 yrs ago - on file    For endomitriosis     CYSTOSCOPY N/A 12/4/2020    Procedure: Cystoscopy;  Surgeon: Percy Resendiz MD;  [facility]     LAPAROSCOPIC HYSTERECTOMY TOTAL, SALPINGECTOMY Right 12/4/2020    Procedure: LAPAROSCOPIC LYSIS OF ADHESIONS,HYSTERECTOMY, TOTAL, LAPAROSCOPIC, WITH RIGHT  SALPINGECTOMY AND CYSTOSCOPY;  Surgeon: Purnima Chapman MD;  [facility]     LAPAROSCOPIC LYSIS ADHESIONS N/A 12/4/2020    Procedure: Laparoscopic lysis adhesions;  Surgeon: Percy Resendiz MD;  [facility]     LAPAROSCOPIC SALPINGECTOMY  9/23/2010    left side       Social History   I have reviewed this patient's social history and updated it with pertinent information if needed.  Social History     Tobacco Use     Smoking status: Never Smoker     Smokeless tobacco: Never Used   Substance Use Topics     Alcohol use: Not Currently     Comment: no drink in several months      Drug use: No       Family History   I have reviewed this patient's family history and updated it with pertinent information if needed.  Family History   Problem Relation Age of Onset     Diabetes Father      Cerebrovascular Disease Paternal Grandmother        Prior to Admission Medications   Prior to Admission Medications   Prescriptions Last Dose Informant Patient Reported? Taking?   acetaminophen (TYLENOL) 325 MG tablet 12/11/2020 at 1100  No Yes   Sig: Take 2 tablets (650 mg) by mouth every 6 hours as needed for mild pain   enoxaparin ANTICOAGULANT (LOVENOX ANTICOAGULANT) 40 MG/0.4ML syringe 12/10/2020 at 2130  No Yes   Sig: Inject 0.4 mLs (40 mg) Subcutaneous daily   ibuprofen (ADVIL/MOTRIN) 600 MG tablet 12/10/2020 at Unknown time  No Yes   Sig: Take 1 tablet (600 mg) by mouth every 6 hours as needed for moderate pain   ondansetron (ZOFRAN-ODT) 4 MG ODT tab  Unknown at Unknown time  No No   Sig: Take 1 tablet (4 mg) by mouth every 6 hours as needed for nausea   senna-docusate (SENOKOT-S/PERICOLACE) 8.6-50 MG tablet 12/11/2020 at 0700  No Yes   Sig: Take 1 tablet by mouth daily   spironolactone (ALDACTONE) 50 MG tablet Past Month at Unknown time  No Yes   Sig: Take 1 tablet (50 mg) by mouth daily      Facility-Administered Medications: None     Allergies   No Known Allergies    Physical Exam   Vital Signs: Temp: 100  F (37.8  C) Temp src: Oral BP: 112/66 Pulse: 111   Resp: 18 SpO2: 96 % O2 Device: None (Room air)    Weight: 163 lbs 12.8 oz    General Appearance: Alert and oriented x3, pleasant  HEENT: Anicteric sclera, MMM   Respiratory: Breathing comfortably on room air, lungs CTAB without wheezing or crackles   Cardiovascular: Tachycardic to 100s, S1, S2. No murmur noted  GI: Abdomen soft active bowel sounds. Lower abdominal/pelvic tenderness to palpations. No guarding or rebound  Lymph/Hematologic: No peripheral edema, distal pulses palpable   Skin: No rash or jaundice   Musculoskeletal: Moves all extremities   Neurologic: No focal deficits, CN II-XII grossly intact      Data   Data reviewed today: I reviewed all medications, new labs and imaging results over the last 24 hours    Recent Labs   Lab 12/11/20  1711   WBC 19.8*   HGB 8.5*   MCV 86      INR 1.24*      POTASSIUM 3.4   CHLORIDE 105   CO2 28   BUN 6*   CR 0.70   ANIONGAP 6   FAN 8.7   *   ALBUMIN 3.2*   PROTTOTAL 7.8   BILITOTAL 1.5*   ALKPHOS 67   ALT 34   AST 17     Recent Results (from the past 24 hour(s))   CT Abdomen Pelvis w Contrast    Narrative    CT ABDOMEN PELVIS WITH CONTRAST   12/11/2020 6:31 PM     HISTORY: Abdominal pain, acute, generalized, with fever,  postoperative. Status post hysterectomy 2/2 endometriosis, 7 days PTA,  increased pain, decreased bowel movement and flatus.    TECHNIQUE:  CT abdomen and pelvis with 79ml isovue 370 IV. Radiation  dose for this scan was  reduced using automated exposure control,  adjustment of the mA and/or kV according to patient size, or iterative  reconstruction technique.    COMPARISON: None available    FINDINGS:  Lower chest: Lung bases are clear.    Abdomen/pelvis: No suspicious focal hepatic lesion. The gallbladder is  unremarkable. No main pancreatic ductal dilatation or definite solid  pancreatic mass. No splenomegaly. No adrenal nodules. No radiodense  kidney stones or hydronephrosis in either kidney. Few subcentimeter  hypoattenuating cystic foci predominantly in the right kidney, which  is not characterized, could represent renal cysts.    No abnormally dilated bowel loops. Fecalization of distal small bowel  content, suggesting slow bowel transit. Postsurgical changes of  hysterectomy with mildly complex loculated fluid in the deep pelvis  posterior to the urinary bladder and anterior to the rectum,  containing a few foci of air measuring approximately 4.4 x 4.5 cm in  axial dimensions (series 3 image 161), worrisome for abscess. No free  peritoneal or portal venous gas.    Bones and soft tissues: No suspicious osseous lesion. Foci of gas  within the subcutaneous tissue in the anterior abdominal wall, likely  related to recent surgery.      Impression    IMPRESSION:  1. Postsurgical changes of hysterectomy with mildly complex loculated  pelvic collection in the surgical bed, worrisome for an abscess.  2. Fecalization of distal small bowel content, suggesting slow bowel  transit.     OLIVER BAUM MD

## 2020-12-12 NOTE — PROGRESS NOTES
IR procedure cancelled.reg diet. Pt is fall precaution due to light headed when she was walking to b/r alone. Hgb 7.0.md knows about hgb. No blood tx ordered. Will check hgb q 8hrs.pt reports bleeding fom rectum.k 3.3. kcl replaced.k level recheck at 1500 today.afeb,vss, no c/o pain

## 2020-12-12 NOTE — PLAN OF CARE
"Nursing Focus: Admission  D: Arrived at 2230 from OSH via transport. Admitted for fever and increasing pain. No complaints over night.      I: Admission process began.  Patient oriented to room, enviroment, call light.  Md. notified of patients arrival on unit.     A: Tmax 99.9, tachy 124 - not within parameters to notify, OVS stable. Triggere sepsis, lactic 0.6. Pt denied pain/SOB/n/v. 2 PIVs in place, 100ml/hr LR infusing as ordered. Zosyn given as scheduled. NPO in preparation for IR procedure today. Pt c/o feeling \"off\" around 0600, spot checked BS and 102. AM labs revealed 7.0 Hgb this AM, dropped from 8.5 from 12/11 at 1711 - FYI'd provider, no interventions. No concerns for active bleeding tonight. Potassium 3.3 this AM. No significant events overnight.    P: Implement plan of care when available. Continue to monitor patient. Nursing interventions as appropriate. Notify md with changes in pt status.    **At shift change, pt had excess blood loss from vagina as she stood up from the bed. Denies dizziness/lightheaded. Morning RN aware.  "

## 2020-12-12 NOTE — CONSULTS
INTERVENTIONAL RADIOLOGY CONSULT NOTE    Reason for referral:   Image guided drain placement.     History:   40 year old female s/p recent laparoscopic hysterectomy with lysis of adhesions on 12/4/20. Admitted for fevers, weakness and fatigue. CT showed a complex fluid collection in the lower pelvis rectovesical space measuring 4.4 cm x 4.5 cm.     Labs:  Lab Results   Component Value Date    HGB 7.0 12/12/2020     Lab Results   Component Value Date     12/12/2020     Lab Results   Component Value Date    WBC 16.6 12/12/2020       Lab Results   Component Value Date    INR 1.24 12/11/2020       Lab Results   Component Value Date    PROTTOTAL 6.3 12/12/2020      Lab Results   Component Value Date    ALBUMIN 2.4 12/12/2020     Lab Results   Component Value Date    BILITOTAL 1.4 12/12/2020     No results found for: BILICONJ   Lab Results   Component Value Date    ALKPHOS 59 12/12/2020     Lab Results   Component Value Date    AST 23 12/12/2020     Lab Results   Component Value Date    ALT 27 12/12/2020       Lab Results   Component Value Date    CR 0.72 12/12/2020     Lab Results   Component Value Date    BUN 7 12/12/2020       Imaging:   Complex fluid collection in the rectovesical space measuring 4.4 cm x 4.5 cm concerning for abscess.      Assessment/Plan:     40 year old female s/p recent laparoscopic hysterectomy with lysis of adhesions on 12/4/20 with CT finding concerning for abscess in the rectovesical space. Upon reviewing of the CT there is no safe window to perform drain placement percutaneously. If the patient is responding to IV antibiotic, the drain placement might not be needed. If the patient is not responding to the IV antibiotic, re-consult IR for potential more invasive approach such as transrectal/transvaginal approach.     Discussed with Dr. Muller and Dr. Holbrook.     IR pass pager: [phone]

## 2020-12-12 NOTE — CONSULTS
[facility]    Gynecology Consultation    Referring Physician: Dr. Garcia  Reason for Consult: post-op complication    HPI: Millicent Celaya is a 40 year old  POD#7 s/p TLH, RS, NIEVES for endometriosis, fibroids, and abnormal uterine bleeding.  Patient reports that initially she had been doing well over the weekend and pain was adequately controlled.  She then reports low-grade fevers that started earlier this week to 101 at home yesterday.  She also had worsening of pain.  Denies nausea but really has not had much of an appetite and has been intentionally limiting intake.  Reports that bowel function has not been normal.  She did have a bowel movement on Tuesday but has not had a normal bowel movement since that time. Is passing flatus.  Has been taking stool softeners at home. Has had some ongoing vaginal spotting - no heavy bleeding or clots. No prurulent drainage. Reports today her fever got much worse and pain persisted and thus she seeks evaluation in the emergency department.       PMH:   Past Medical History:   Diagnosis Date     Cervical high risk HPV (human papillomavirus) test positive 10/07/2020    10/07/20     Endometriosis      History of blood transfusion 2 years ago    Due in part to taking Xarelto for PE     Menorrhagia     whole life     PE (pulmonary thromboembolism) (H)        PSH:   Past Surgical History:   Procedure Laterality Date     ABDOMEN SURGERY  8 yrs ago - on file    For endomitriosis     CYSTOSCOPY N/A 2020    Procedure: Cystoscopy;  Surgeon: Percy Resendiz MD;  Location: [facility] OR     LAPAROSCOPIC HYSTERECTOMY TOTAL, SALPINGECTOMY Right 2020    Procedure: LAPAROSCOPIC LYSIS OF ADHESIONS,HYSTERECTOMY, TOTAL, LAPAROSCOPIC, WITH RIGHT  SALPINGECTOMY AND CYSTOSCOPY;  Surgeon: Purnima Chapman MD;  Location: [facility] OR     LAPAROSCOPIC LYSIS ADHESIONS N/A 2020    Procedure: Laparoscopic lysis adhesions;  Surgeon:  Percy Resendiz MD;  Location: [facility] OR     LAPAROSCOPIC SALPINGECTOMY  9/23/2010    left side       Social Hx:   Social History     Tobacco Use     Smoking status: Never Smoker     Smokeless tobacco: Never Used   Substance Use Topics     Alcohol use: Not Currently     Comment: no drink in several months      Drug use: No        Family History: family history includes Cerebrovascular Disease in her paternal grandmother; Diabetes in her father.    ROS:   Negative except per HPI    Objective:   Patient Vitals for the past 5 hrs:   BP Temp Temp src Pulse Resp SpO2   12/11/20 2013 125/80 101.5  F (38.6  C) Oral 111 20 --   12/11/20 1740 130/73 102.7  F (39.3  C) Oral 116 20 94 %   12/11/20 1620 135/73 102.6  F (39.2  C) Oral 135 18 100 %   12/11/20 1609 -- 103.5  F (39.7  C) Oral 140 16 98 %   Constitutional: Non-toxic appearing female, no acute distress  HEENT: Normal appearance.   Cardiovascular: Tachycardic. Well perfused  Respiratory: Unlabored breathing on room air  Gastrointestinal: Abdomen soft, moderately tender in suprapubic region and in right and left lower quadrants. No rebound or guarding  Skin: No suspicious lesions or rashes  Psychiatric: mentation appears normal and affect normal/bright    Labs:  Sodium 139   Potassium 3.4   Chloride 105   Carbon Dioxide 28   Urea Nitrogen 6 (L)   Creatinine 0.70   GFR Estimate >90   GFR Estimate If Black >90   Calcium 8.7   Anion Gap 6   Albumin 3.2 (L)   Protein Total 7.8   Bilirubin Total 1.5 (H)   Alkaline Phosphatase 67   ALT 34   AST 17   Lactic Acid 1.0   Glucose 128 (H)   WBC 19.8 (H)   Hemoglobin 8.5 (L)   Hematocrit 27.4 (L)   Platelet Count 376   RBC Count 3.18 (L)   MCV 86   MCH 26.7   MCHC 31.0 (L)   RDW 14.7   Diff Method Automated Method   % Neutrophils 89.1   % Lymphocytes 4.4   % Monocytes 5.2   % Eosinophils 0.3   % Basophils 0.2   % Immature Granulocytes 0.8   Nucleated RBCs 0   Absolute Neutrophil 17.6 (H)   Absolute Lymphocytes 0.9   Absolute  Monocytes 1.0   Absolute Eosinophils 0.1   Absolute Basophils 0.0   Abs Immature Granulocytes 0.2   Absolute Nucleated RBC 0.0   INR 1.24 (H)   PTT 36   Blood cultures pending    Imaging:  CT A/P  IMPRESSION:  1. Postsurgical changes of hysterectomy with mildly complex loculated  pelvic collection in the surgical bed, worrisome for an abscess.  2. Fecalization of distal small bowel content, suggesting slow bowel  transit.     Assessment/Plan:   Millicent Celaya is a  40 year old  POD#7 s/p TLH, RS, significant lysis of adhesions for endometriosis, fibroids, and abnormal uterine bleeding who presents with fever and worsening abdominal pain. On arrival she was found to be tachycardic, febrile to 103.5, and have a leukocytosis. CT scan obtained which demonstrated a 4.4x4.5cm complex fluid collection anterior to the rectum concerning for abscess. Imaging is not consistent with vaginal cuff abscess. Given extensive bowel dissection due to endometriosis there is some concern for bowel injury. Discussed case with General Surgery who assisted with lysis of adhesions during the case. Recommend transfer to the [place] for IV antibiotics and IR drain placement.    -Transfer to [place] for IV antibiotics and IR drain placement  -Patient has a history of PE, recommend Lovenox ppx while inpatient following drain placement  -GYN will continue to follow along    Staffed with Dr. Charisma Watson MD  Ob/Gyn PGY-3  2020 8:14 PM      Physician Attestation   I, Betty Zafar MD, saw this patient with the resident and agree with the resident/fellow's findings and plan of care as documented in the note.      I personally reviewed vital signs, medications, labs, imaging and exam.    Key findings: Pelvic abscess abutting the rectum. Appreciate general surgery recommendations. Discussed with patient.     Betty Zafar MD  Date of Service (when I saw the patient): 20

## 2020-12-12 NOTE — PROVIDER NOTIFICATION
"Paged provider: \"FYI Hgb on 12/11 at 1711 was 8.5, this AM Hgb now 7.0. Pt has had LR 100ml/hr running tonight. No concerns for active bleeding overnight. Let me know for interventions\"  "

## 2020-12-12 NOTE — PROGRESS NOTES
Surgery Progress Note  12/12/2020       Subjective:  Patient was seen and evaluated at bedside in NAD. No acute events since admission. Patient states that she is feeling a little better. Pain well controlled. Still Tachycardic. Passing flatus, voiding freely.      Objective:  Temp:  [97.7  F (36.5  C)-103.5  F (39.7  C)] 99.9  F (37.7  C)  Pulse:  [] 124  Resp:  [16-20] 18  BP: (112-135)/(66-80) 132/80  SpO2:  [94 %-100 %] 98 %    I/O last 3 completed shifts:  In: 1733.33 [I.V.:733.33; IV Piggyback:1000]  Out: -       Gen: Awake, alert, NAD  Resp: NLB on RA  Abd: soft, nondistended, minimally tender in the lower abdomen, no guarding, no rebound   Incision: c/d/I  Ext: WWP, no edema     Labs:  Recent Labs   Lab 12/12/20  0504 12/11/20  1711   WBC 16.6* 19.8*   HGB 7.0* 8.5*    376       Recent Labs   Lab 12/12/20  0504 12/11/20  1711    139   POTASSIUM 3.3* 3.4   CHLORIDE 107 105   CO2 26 28   BUN 7 6*   CR 0.72 0.70   * 128*   FAN 8.4* 8.7       Imaging:  Reviewed      Assessment/Plan:   40 year old female now POD 8 from Laparoscopic Hysterectomy with Lysis of adhesions  On 12/4/20. Admitted for fevers, weakness and fatigue. CT showed a pelvis abscess near the rectum 4.4 cm x 4.5 cm.     Plan:   - Continue Cares per primary   - Recommend IR consult for drainage   - Continue IV Abx per primary   - EGS will Continue to follow      Seen, examined, and discussed with chief resident, who will discuss with staff.  - - - - - - - - - - - - - - - - - -  Panchito Arthur MD  General Surgery PGY-1

## 2020-12-13 VITALS
HEART RATE: 100 BPM | HEIGHT: 62 IN | OXYGEN SATURATION: 100 % | RESPIRATION RATE: 18 BRPM | TEMPERATURE: 98.2 F | BODY MASS INDEX: 30.16 KG/M2 | DIASTOLIC BLOOD PRESSURE: 83 MMHG | WEIGHT: 163.9 LBS | SYSTOLIC BLOOD PRESSURE: 135 MMHG

## 2020-12-13 LAB
ABO + RH BLD: NORMAL
ABO + RH BLD: NORMAL
ANION GAP SERPL CALCULATED.3IONS-SCNC: 3 MMOL/L (ref 3–14)
BLD GP AB SCN SERPL QL: NORMAL
BLD PROD TYP BPU: NORMAL
BLD PROD TYP BPU: NORMAL
BLD UNIT ID BPU: 0
BLOOD BANK CMNT PATIENT-IMP: NORMAL
BLOOD PRODUCT CODE: NORMAL
BPU ID: NORMAL
BUN SERPL-MCNC: 5 MG/DL (ref 7–30)
CALCIUM SERPL-MCNC: 8.7 MG/DL (ref 8.5–10.1)
CHLORIDE SERPL-SCNC: 110 MMOL/L (ref 94–109)
CO2 SERPL-SCNC: 26 MMOL/L (ref 20–32)
CREAT SERPL-MCNC: 0.74 MG/DL (ref 0.52–1.04)
CRP SERPL-MCNC: 150 MG/L (ref 0–8)
ERYTHROCYTE [DISTWIDTH] IN BLOOD BY AUTOMATED COUNT: 15.2 % (ref 10–15)
GFR SERPL CREATININE-BSD FRML MDRD: >90 ML/MIN/{1.73_M2}
GLUCOSE SERPL-MCNC: 108 MG/DL (ref 70–99)
HCT VFR BLD AUTO: 24 % (ref 35–47)
HGB BLD-MCNC: 6.9 G/DL (ref 11.7–15.7)
HGB BLD-MCNC: 8.7 G/DL (ref 11.7–15.7)
LACTATE BLD-SCNC: 0.7 MMOL/L (ref 0.7–2)
MCH RBC QN AUTO: 26 PG (ref 26.5–33)
MCHC RBC AUTO-ENTMCNC: 28.8 G/DL (ref 31.5–36.5)
MCV RBC AUTO: 91 FL (ref 78–100)
NUM BPU REQUESTED: 2
PLATELET # BLD AUTO: 325 10E9/L (ref 150–450)
POTASSIUM SERPL-SCNC: 3.7 MMOL/L (ref 3.4–5.3)
RBC # BLD AUTO: 2.65 10E12/L (ref 3.8–5.2)
SODIUM SERPL-SCNC: 139 MMOL/L (ref 133–144)
SPECIMEN EXP DATE BLD: NORMAL
TRANSFUSION STATUS PATIENT QL: NORMAL
TRANSFUSION STATUS PATIENT QL: NORMAL
WBC # BLD AUTO: 12.6 10E9/L (ref 4–11)

## 2020-12-13 PROCEDURE — 83605 ASSAY OF LACTIC ACID: CPT | Performed by: OBSTETRICS & GYNECOLOGY

## 2020-12-13 PROCEDURE — 250N000011 HC RX IP 250 OP 636: Performed by: PHYSICIAN ASSISTANT

## 2020-12-13 PROCEDURE — 86140 C-REACTIVE PROTEIN: CPT | Performed by: INTERNAL MEDICINE

## 2020-12-13 PROCEDURE — P9016 RBC LEUKOCYTES REDUCED: HCPCS | Performed by: STUDENT IN AN ORGANIZED HEALTH CARE EDUCATION/TRAINING PROGRAM

## 2020-12-13 PROCEDURE — 85018 HEMOGLOBIN: CPT | Performed by: INTERNAL MEDICINE

## 2020-12-13 PROCEDURE — 36415 COLL VENOUS BLD VENIPUNCTURE: CPT | Performed by: INTERNAL MEDICINE

## 2020-12-13 PROCEDURE — 85027 COMPLETE CBC AUTOMATED: CPT | Performed by: INTERNAL MEDICINE

## 2020-12-13 PROCEDURE — 36415 COLL VENOUS BLD VENIPUNCTURE: CPT | Performed by: OBSTETRICS & GYNECOLOGY

## 2020-12-13 PROCEDURE — 80048 BASIC METABOLIC PNL TOTAL CA: CPT | Performed by: INTERNAL MEDICINE

## 2020-12-13 PROCEDURE — 258N000003 HC RX IP 258 OP 636: Performed by: PHYSICIAN ASSISTANT

## 2020-12-13 PROCEDURE — 99239 HOSP IP/OBS DSCHRG MGMT >30: CPT | Performed by: INTERNAL MEDICINE

## 2020-12-13 PROCEDURE — 250N000013 HC RX MED GY IP 250 OP 250 PS 637: Performed by: PHYSICIAN ASSISTANT

## 2020-12-13 RX ADMIN — Medication 1 MG: at 00:43

## 2020-12-13 RX ADMIN — ACETAMINOPHEN 650 MG: 325 TABLET, FILM COATED ORAL at 17:15

## 2020-12-13 RX ADMIN — ACETAMINOPHEN 650 MG: 325 TABLET, FILM COATED ORAL at 00:43

## 2020-12-13 RX ADMIN — PIPERACILLIN SODIUM AND TAZOBACTAM SODIUM 4.5 G: 4; .5 INJECTION, POWDER, LYOPHILIZED, FOR SOLUTION INTRAVENOUS at 17:15

## 2020-12-13 RX ADMIN — PIPERACILLIN SODIUM AND TAZOBACTAM SODIUM 4.5 G: 4; .5 INJECTION, POWDER, LYOPHILIZED, FOR SOLUTION INTRAVENOUS at 13:46

## 2020-12-13 RX ADMIN — SODIUM CHLORIDE, POTASSIUM CHLORIDE, SODIUM LACTATE AND CALCIUM CHLORIDE: 600; 310; 30; 20 INJECTION, SOLUTION INTRAVENOUS at 04:15

## 2020-12-13 RX ADMIN — PIPERACILLIN SODIUM AND TAZOBACTAM SODIUM 4.5 G: 4; .5 INJECTION, POWDER, LYOPHILIZED, FOR SOLUTION INTRAVENOUS at 02:01

## 2020-12-13 RX ADMIN — PIPERACILLIN SODIUM AND TAZOBACTAM SODIUM 4.5 G: 4; .5 INJECTION, POWDER, LYOPHILIZED, FOR SOLUTION INTRAVENOUS at 08:03

## 2020-12-13 ASSESSMENT — ACTIVITIES OF DAILY LIVING (ADL)
ADLS_ACUITY_SCORE: 17

## 2020-12-13 NOTE — PROGRESS NOTES
[facility]     Gynecology  Post-Op Note--done virtually due to covid pandemic    Assessment & Plan     POD #9 TLH with extensive NIEVES  Readmit with pelvic abscess   -Now 36 hours afebrile on IV ABX   -abscess spontaneously draining through vaginal cuff   -WBC trending down   -chronic anemia   -normal bowel function this am, tolerating diet   -pain much improved from admit    Plan:  Since pt will be 48 hours afebrile on IV Zosyn at 2200 tonight, could be discharged home this evening (2200) on po Augmentin BID for 14 days. (she prefers this over discharge on 12/14 due to roommate and inability to get good rest inpatient)    Agree with one unit of blood today and since pt with chronic anemia, this should be sufficient for her to feel better.  Will plan to have her come to clinic this week for repeat CBC and see how the drainage is going and plan repeat CT scan next week to ensure resolution of the abscess.    Patient has my personal cell phone number and we talked a few times yesterday. Can call me anytime.    Appreciate primary team taking care of this ganga patient. Defer plan to team.    TIFFANY ASH    Interval History   Doing well.  Continues to improve.  Pain is well-controlled.  No fevers.  Has bloody foul drainage from vagina c/w abscess.  +BM. Ambulating and voiding    Physical Exam   Temp: 96.5  F (35.8  C) Temp src: Axillary BP: 135/70 Pulse: 87   Resp: 16 SpO2: 98 % O2 Device: None (Room air)    Vitals:    12/11/20 2236 12/12/20 0735   Weight: 74.3 kg (163 lb 12.8 oz) 74.3 kg (163 lb 14.4 oz)     Vital Signs with Ranges  Temp:  [96.5  F (35.8  C)-99.1  F (37.3  C)] 96.5  F (35.8  C)  Pulse:  [] 87  Resp:  [16-18] 16  BP: ()/(55-79) 135/70  SpO2:  [97 %-98 %] 98 %  I/O last 3 completed shifts:  In: 1220 [P.O.:240; I.V.:980]  Out: -     Virtual visit.    Medications     lactated ringers 100 mL/hr at 12/13/20 0659        piperacillin-tazobactam   4.5 g Intravenous Q6H     senna-docusate  1 tablet Oral BID    Or     senna-docusate  2 tablet Oral BID     sodium chloride (PF)  3 mL Intracatheter Q8H       Data   Recent Labs   Lab 12/13/20  0451 12/12/20  2133 12/12/20  1449 12/12/20  0504 12/11/20  1711   WBC 12.6*  --   --  16.6* 19.8*   HGB 6.9* 7.4* 7.6* 7.0* 8.5*   MCV 91  --   --  89 86     --   --  314 376   INR  --   --   --   --  1.24*     --   --  138 139   POTASSIUM 3.7  --  3.8 3.3* 3.4   CHLORIDE 110*  --   --  107 105   CO2 26  --   --  26 28   BUN 5*  --   --  7 6*   CR 0.74  --   --  0.72 0.70   ANIONGAP 3  --   --  5 6   FAN 8.7  --   --  8.4* 8.7   *  --   --  110* 128*   ALBUMIN  --   --   --  2.4* 3.2*   PROTTOTAL  --   --   --  6.3* 7.8   BILITOTAL  --   --   --  1.4* 1.5*   ALKPHOS  --   --   --  59 67   ALT  --   --   --  27 34   AST  --   --   --  23 17

## 2020-12-13 NOTE — DISCHARGE SUMMARY
[facility]   Hospitalist Discharge Summary      Date of Admission:  12/11/2020  Date of Discharge:  12/13/2020  Discharging Provider: Marcos Holbrook MD, MD  Discharge Team: Hospitalist Service, Gold 9    Discharge Diagnoses     Severe sepsis 2/2 intraabdominal abscess in post operative period - s/p TLH, RS, NIEVES  12/4/2020:      H/o PE:  Acute blood loss anemia:   Constipation:    Transaminitis:      Follow-ups Needed After Discharge   Follow-up Appointments     Adult [facility] Follow-up and recommended labs and tests      Follow up with primary care provider, Francesca Padron, within 7 days   for hospital follow- up.        Follow up with Dr. Chapman/ OBG , at (location with clinic name or city)   [facility]  this week for repeat CBC and see how the drainage is going and plan   repeat CT scan next week to ensure resolution of the abscess.     Appointments on [place] and/or [facility] (with [facility] or [facility]   provider or service). Call [phone] if you haven't heard regarding   these appointments within 7 days of discharge.         {    Unresulted Labs Ordered in the Past 30 Days of this Admission     Date and Time Order Name Status Description    12/11/2020 1643 Blood culture Preliminary     12/11/2020 1643 Blood culture Preliminary           Discharge Disposition   Discharged to home  Condition at discharge: Stable    Hospital Course   Millicent Celaya is a 40 year old female with history of provoked PE (2016), endometriosis, fibroids, and recent TLH/RS/NIEVES 12/4 who was admitted to [facility] 12/11/2020 with severe sepsis 2/2 intraabdominal abscess      Severe sepsis 2/2 intraabdominal abscess, s/p TLH, RS, NIEVES 12/4/2020: Per gynecology and GS for endometriosis, fibroids, and abnormal uterine bleeding. Vaginal spotting but not heavy bleeding since surgery. Increased pain, fevers starting 12/10. Temp 103.5,  on presentation. WBC 19.8, LA 1.0. CT AP 12/11 4.4x4.5 cm  "complex fluid collection anterior to rectum c/w abscess, concern for bowel injury given significant bowel dissection due to endometriosis. Seen by gyn and treated with Zosyn in the ED  - Appreciate Gynecology, GS , IR consults. Her  Zosyn was continued and IR reviewed the imaging \"Upon reviewing of the CT there is no safe window to perform drain placement percutaneously. If the patient is responding to IV antibiotic, the drain placement might not be needed. If the patient is not responding to the IV antibiotic\"   She clinically improved with IV antibiotics and remained afebrile for > 24 hrs before discharge so her Abx were changed to PO and advised close f/u with OBG as outpatient.      H/o PE: 2016 2/2 OCPs.   -  held Lovenox for now due to bleeding. Resumed on discharge     Acute blood loss anemia: BL hgb 10-11. Down to 6.9 12/13  - Trend hgb Q8 hr 8.5-->7 . Transfuse for <7 (consent signed)   12/13: transfuse 1 U PRBC , will have recheck in OBG clinic    Constipation: NBowel regimen      Transaminitis: Tbili 1.5, other LFTs normal. No prior bili available  - Trend  as outpatient       Consultations This Hospital Stay   GYNECOLOGY IP CONSULT  INTERVENTIONAL RADIOLOGY ADULT/PEDS IP CONSULT  SURGERY GENERAL ADULT IP CONSULT  INFECTIOUS DISEASE GENERAL ADULT IP CONSULT    Code Status   Full Code    Time Spent on this Encounter   I, Marcos Holbrook MD, MD, personally saw the patient today and spent greater than 30 minutes discharging this patient.       Marcos Holbrook MD, MD  [facility] [address] [place] [id number]  Phone: [phone]  ______________________________________________________________________    Physical Exam   Vital Signs: Temp: 96.4  F (35.8  C) Temp src: Oral BP: 135/85 Pulse: 107(ambulating halls)   Resp: 18 SpO2: 100 % O2 Device: None (Room air)    Weight: 163 lbs 14.4 oz  Gen- pleasant  lying in bed  HEENT- NAD, JESÚS, MMM  Neck- supple  CVS- I+II+ no m/r/g  RS- " CTAB  Abdo- soft, no tenderness . No g/r/r   Ext- no edema   CNS- no gross focal deficit   Primary Care Physician   Francesca Padron    Discharge Orders      Reason for your hospital stay    Millicent Celaya is a 40 year old female with history of provoked PE (2016), endometriosis, fibroids, and recent TLH/RS/NIEVES 12/4 who was admitted to [facility] 12/11/2020 with severe sepsis 2/2 intraabdominal abscess     Adult [facility] Follow-up and recommended labs and tests    Follow up with primary care provider, Francesca Padron, within 7 days for hospital follow- up.        Follow up with Dr. Chapman/ OBG , at (location with clinic name or city) [facility]  this week for repeat CBC and see how the drainage is going and plan repeat CT scan next week to ensure resolution of the abscess.     Appointments on [place] and/or [facility] (with [facility] or [facility] provider or service). Call [phone] if you haven't heard regarding these appointments within 7 days of discharge.     Activity    Your activity upon discharge: activity as tolerated     When to contact your care team    Call your primary doctor if you have any of the following: temperature greater than 100.4 or less than 96,  increased shortness of breath, increased pain or excessive bleeding.     Diet    Follow this diet upon discharge: Orders Placed This Encounter      Regular Diet Adult       Significant Results and Procedures   Results for orders placed or performed during the hospital encounter of 12/11/20   CT Abdomen Pelvis w Contrast    Narrative    CT ABDOMEN PELVIS WITH CONTRAST   12/11/2020 6:31 PM     HISTORY: Abdominal pain, acute, generalized, with fever,  postoperative. Status post hysterectomy 2/2 endometriosis, 7 days PTA,  increased pain, decreased bowel movement and flatus.    TECHNIQUE:  CT abdomen and pelvis with 79ml isovue 370 IV. Radiation  dose for this scan was reduced using automated exposure control,  adjustment of the mA and/or kV according to  patient size, or iterative  reconstruction technique.    COMPARISON: None available    FINDINGS:  Lower chest: Lung bases are clear.    Abdomen/pelvis: No suspicious focal hepatic lesion. The gallbladder is  unremarkable. No main pancreatic ductal dilatation or definite solid  pancreatic mass. No splenomegaly. No adrenal nodules. No radiodense  kidney stones or hydronephrosis in either kidney. Few subcentimeter  hypoattenuating cystic foci predominantly in the right kidney, which  is not characterized, could represent renal cysts.    No abnormally dilated bowel loops. Fecalization of distal small bowel  content, suggesting slow bowel transit. Postsurgical changes of  hysterectomy with mildly complex loculated fluid in the deep pelvis  posterior to the urinary bladder and anterior to the rectum,  containing a few foci of air measuring approximately 4.4 x 4.5 cm in  axial dimensions (series 3 image 161), worrisome for abscess. No free  peritoneal or portal venous gas.    Bones and soft tissues: No suspicious osseous lesion. Foci of gas  within the subcutaneous tissue in the anterior abdominal wall, likely  related to recent surgery.      Impression    IMPRESSION:  1. Postsurgical changes of hysterectomy with mildly complex loculated  pelvic collection in the surgical bed, worrisome for an abscess.  2. Fecalization of distal small bowel content, suggesting slow bowel  transit.     OLIVER BAUM MD       Discharge Medications   Current Discharge Medication List      START taking these medications    Details   amoxicillin-clavulanate (AUGMENTIN) 875-125 MG tablet Take 1 tablet by mouth 2 times daily for 14 days  Qty: 28 tablet, Refills: 0    Associated Diagnoses: Postprocedural intraabdominal abscess         CONTINUE these medications which have NOT CHANGED    Details   acetaminophen (TYLENOL) 325 MG tablet Take 2 tablets (650 mg) by mouth every 6 hours as needed for mild pain  Qty: 100 tablet, Refills: 0     Associated Diagnoses: S/P hysterectomy      enoxaparin ANTICOAGULANT (LOVENOX ANTICOAGULANT) 40 MG/0.4ML syringe Inject 0.4 mLs (40 mg) Subcutaneous daily  Qty: 16 mL, Refills: 0    Associated Diagnoses: S/P hysterectomy      ibuprofen (ADVIL/MOTRIN) 600 MG tablet Take 1 tablet (600 mg) by mouth every 6 hours as needed for moderate pain  Qty: 60 tablet, Refills: 0    Associated Diagnoses: S/P hysterectomy      senna-docusate (SENOKOT-S/PERICOLACE) 8.6-50 MG tablet Take 1 tablet by mouth daily  Qty: 100 tablet, Refills: 0    Associated Diagnoses: S/P hysterectomy      spironolactone (ALDACTONE) 50 MG tablet Take 1 tablet (50 mg) by mouth daily  Qty: 30 tablet, Refills: 0    Comments: Needs follow up  Associated Diagnoses: Cystic acne      ondansetron (ZOFRAN-ODT) 4 MG ODT tab Take 1 tablet (4 mg) by mouth every 6 hours as needed for nausea  Qty: 20 tablet, Refills: 0    Associated Diagnoses: S/P hysterectomy           Allergies   No Known Allergies

## 2020-12-13 NOTE — PLAN OF CARE
Discharge to home today after 2000 per MD order.pt will  discharge meds this  afternoon. 1 unit prbc infusion without reaction.good po intake.up ad sabina.

## 2020-12-13 NOTE — PLAN OF CARE
[id number]  VSS, alert and oriented x 4, no sob, temp max 99.1,  c/o headache, given prn tylenol, has night sweet, change bedding and recheck temp 97.5  Sepsis trigger, lactic acid is 0.7 vitals done and stable,  Lab hemoglobin needs to check at every 8 hrs, hemoglobin recheck at 0451 is 6.9  R PIV TKO infusing, L PIV LR infusing @100ml/hr  Has good urine output, one loose stool this shift. Pt. Slept well for this shift.  Continue to monitor care.

## 2020-12-13 NOTE — PROGRESS NOTES
Surgery Progress Note  12/13/2020       Subjective:  Feeling well, would like to go home today. Is draining larger amount of light pink odorous fluid from vagina. Had a bowel movement described as being more liquid than normal.     Objective:  Temp:  [96.6  F (35.9  C)-99.8  F (37.7  C)] 96.6  F (35.9  C)  Pulse:  [] 101  Resp:  [18] 18  BP: ()/(55-85) 115/79  SpO2:  [97 %-98 %] 98 %    I/O last 3 completed shifts:  In: 1220 [P.O.:240; I.V.:980]  Out: -       Gen: Awake, alert, NAD, sitting comfortably at edge of bed  Resp: NLB on RA, no cough or wheeze  Abd: soft, nondistended, mildly tender to palpation in suprapubic area  Ext: WWP, no edema     Labs:  Recent Labs   Lab 12/13/20  0451 12/12/20  2133 12/12/20  1449 12/12/20  0504 12/11/20  1711   WBC 12.6*  --   --  16.6* 19.8*   HGB 6.9* 7.4* 7.6* 7.0* 8.5*     --   --  314 376       Recent Labs   Lab 12/13/20  0451 12/12/20  1449 12/12/20  0504 12/11/20  1711     --  138 139   POTASSIUM 3.7 3.8 3.3* 3.4   CHLORIDE 110*  --  107 105   CO2 26  --  26 28   BUN 5*  --  7 6*   CR 0.74  --  0.72 0.70   *  --  110* 128*   FAN 8.7  --  8.4* 8.7          Assessment/Plan:   40 year old female now POD 8 from Laparoscopic Hysterectomy with Lysis of adhesions  On 12/4/20. Admitted for fevers, weakness and fatigue. CT showed a pelvis abscess near the rectum 4.4 cm x 4.5 cm.     IR states there is no window to easily drain the abscess. She is continuing to improve with antibiotic management. If she is unable to resolve with antibiotics, potentially able to drain through rectum although would prefer to avoid invasive options if possible.     - Continue cares per primary team  - Antibiotics per primary team  - EGS will continue to follow     Seen, examined, and discussed with chief resident, who will discuss with staff.  - - - - - - - - - - - - - - - - - -  Brisa Norris   General Surgery PGY-1

## 2020-12-13 NOTE — PLAN OF CARE
Time: 4351-0741    Reason for Admission: POD #8 from Laparoscopic Hysterectomy with Lysis of adhesions. Admitted for fevers, weakness and fatigue.    Activity: Independent    Neuro: A&O x4. Calm and cooperative.     GI/: Voiding spontaneously without difficulty. Pt had 1 small and large BM this shift.     Diet: Regular, tolerating well.     Incisions/Drains: None    IV Access: R PIV infusing TKO. L PIV infusing LR at 100mL/hr.     Labs: Hgb: 7.6 and 7.4. K recheck: 3.8. Recheck in AM.    Vitals: Tachycardic. AOVSS on RA.     Pain: Pt denied any pain or nausea this shift.     New changes this shift: PRN miralax given for constipation. Pt had 1 small and 1 large BM.    Plan: Continue with plan of care.

## 2020-12-14 ENCOUNTER — PATIENT OUTREACH (OUTPATIENT)
Dept: CARE COORDINATION | Facility: CLINIC | Age: 40
End: 2020-12-14

## 2020-12-14 NOTE — PROGRESS NOTES
[facility]: Post-Discharge Note  SITUATION                                                      Admission:    Admission Date: 12/11/20   Reason for Admission: Severe sepsis 2/2 intraabdominal abscess, s/p TLH  Discharge:   Discharge Date: 12/13/20  Discharge Diagnosis: Severe sepsis 2/2 intraabdominal abscess, s/p TLH    BACKGROUND                                                      Millicent Celaya is a 40 year old female with history of provoked PE (2016), endometriosis, fibroids, and recent TLH/RS/NIEVES 12/4 who was admitted to [facility] 12/11/2020 with severe sepsis 2/2 intraabdominal abscess     ASSESSMENT      Discharge Assessment  Patient reports symptoms are: Improved  Does the patient have all of their medications?: Yes  Does patient know what their new medications are for?: Yes  Does patient have a follow-up appointment scheduled?: Yes  Does patient have any other questions or concerns?: No    Post-op  Did the patient have surgery or a procedure: Yes  Incision: healing  Drainage: Yes  Incision Drainage Amount: light  Bleeding: light  Fever: No  Chills: No  Redness: No  Warmth: No  Swelling: No  Incision site pain: No  Eating & Drinking: eating and drinking without complaints/concerns  PO Intake: regular diet  Bowel Function: normal  Urinary Status: voiding without complaint/concerns        PLAN                                                      Outpatient Plan:  Follow-up Appointments     Adult [facility] Follow-up and recommended labs and tests      Follow up with primary care provider, Francesca Padron, within 7 days   for hospital follow- up.        Follow up with Dr. Chapman/ OBG , at (location with clinic name or city)   [facility]  this week for repeat CBC and see how the drainage is going and plan   repeat CT scan next week to ensure resolution of the abscess.     Appointments on [place] and/or [facility] (with [facility] or [facility]   provider or service). Call [phone] if you haven't  heard regarding   these appointments within 7 days of discharge.            Future Appointments   Date Time Provider Department Center   12/16/2020  1:40 PM Francesca Padron MD [facility]    1/12/2021 10:30 AM Purnima Chapman MD [facility]            Rosa Mclain, [facility]

## 2020-12-14 NOTE — PLAN OF CARE
[id number]    VSS on RA, afebrile. C/o mild pain in right wrist and hand from carpal tunnel, PRN tylenol given x1 with relief. Up ad sabina. PIV saline locked, received zosyn. Good PO intake, adequate UOP. Able to make needs known.     Discharge  D: Orders for discharge and outpatient medications written.  I: Home medications and return to clinic schedule reviewed with patient. Discharge instructions and parameters for calling Health Care Provider reviewed. Patient left at 2015 unaccompanied to car where aunt and uncle were waiting for her.  A: Patient/family verbalized understanding and was ready for discharge.   P: Patient instructed to  medications in Pharmacy.

## 2020-12-15 LAB
BLD PROD TYP BPU: NORMAL
BLD UNIT ID BPU: 0
BLOOD PRODUCT CODE: NORMAL
BPU ID: NORMAL
TRANSFUSION STATUS PATIENT QL: NORMAL
TRANSFUSION STATUS PATIENT QL: NORMAL

## 2020-12-16 ENCOUNTER — OFFICE VISIT (OUTPATIENT)
Dept: OBGYN | Facility: CLINIC | Age: 40
End: 2020-12-16
Payer: COMMERCIAL

## 2020-12-16 ENCOUNTER — OFFICE VISIT (OUTPATIENT)
Dept: FAMILY MEDICINE | Facility: CLINIC | Age: 40
End: 2020-12-16
Payer: COMMERCIAL

## 2020-12-16 VITALS
HEART RATE: 91 BPM | WEIGHT: 158.2 LBS | TEMPERATURE: 97.9 F | BODY MASS INDEX: 28.94 KG/M2 | SYSTOLIC BLOOD PRESSURE: 122 MMHG | DIASTOLIC BLOOD PRESSURE: 70 MMHG | OXYGEN SATURATION: 100 %

## 2020-12-16 VITALS
OXYGEN SATURATION: 99 % | WEIGHT: 163 LBS | TEMPERATURE: 98.4 F | BODY MASS INDEX: 30 KG/M2 | HEIGHT: 62 IN | DIASTOLIC BLOOD PRESSURE: 91 MMHG | SYSTOLIC BLOOD PRESSURE: 138 MMHG | HEART RATE: 102 BPM

## 2020-12-16 DIAGNOSIS — K65.1 POSTPROCEDURAL INTRAABDOMINAL ABSCESS (H): Primary | ICD-10-CM

## 2020-12-16 DIAGNOSIS — A41.9 SEVERE SEPSIS (H): ICD-10-CM

## 2020-12-16 DIAGNOSIS — T81.43XA POSTPROCEDURAL INTRAABDOMINAL ABSCESS (H): Primary | ICD-10-CM

## 2020-12-16 DIAGNOSIS — R65.20 SEVERE SEPSIS (H): ICD-10-CM

## 2020-12-16 DIAGNOSIS — G56.00 MEDIAN NERVE COMPRESSION: ICD-10-CM

## 2020-12-16 DIAGNOSIS — D62 ANEMIA DUE TO BLOOD LOSS, ACUTE: Primary | ICD-10-CM

## 2020-12-16 DIAGNOSIS — M54.12 CERVICAL RADICULOPATHY: ICD-10-CM

## 2020-12-16 LAB — HGB BLD-MCNC: 11.5 G/DL (ref 11.7–15.7)

## 2020-12-16 PROCEDURE — 85018 HEMOGLOBIN: CPT | Performed by: FAMILY MEDICINE

## 2020-12-16 PROCEDURE — 99214 OFFICE O/P EST MOD 30 MIN: CPT | Performed by: FAMILY MEDICINE

## 2020-12-16 PROCEDURE — 36415 COLL VENOUS BLD VENIPUNCTURE: CPT | Performed by: FAMILY MEDICINE

## 2020-12-16 PROCEDURE — 99024 POSTOP FOLLOW-UP VISIT: CPT | Performed by: OBSTETRICS & GYNECOLOGY

## 2020-12-16 RX ORDER — GABAPENTIN 100 MG/1
100-300 CAPSULE ORAL
Qty: 30 CAPSULE | Refills: 0 | Status: SHIPPED | OUTPATIENT
Start: 2020-12-16 | End: 2021-03-01

## 2020-12-16 ASSESSMENT — MIFFLIN-ST. JEOR: SCORE: 1357.61

## 2020-12-16 NOTE — PATIENT INSTRUCTIONS
Patient Education     Understanding Cervical Radiculopathy    Cervical radiculopathy is irritation or inflammation of a nerve root in the neck. It causes neck pain and other symptoms that may spread into the chest or down the arm. To understand this condition, it helps to understand the parts of the spine:    Vertebrae. These are bones that stack to form the spine. The cervical spine contains the 7 vertebrae in the neck.    Disks. These are soft pads of tissue between the vertebrae. They act as shock absorbers for the spine.    The spinal canal. This is a tunnel formed within the stacked vertebrae. The spinal cord runs through this canal.    Nerves. These branch off the spinal cord. As they leave the spinal canal, nerves pass through openings between the vertebrae. The nerve root is the part of the nerve that is closest to the spinal cord.   With cervical radiculopathy, nerve roots in the neck become irritated. This leads to pain and symptoms that can travel to the nerves that go from the spinal cord down the arms and into the torso.  What causes cervical radiculopathy?  Aging, injury, poor posture, and other issues can lead to problems in the neck. These problems may then irritate nerve roots. These include:    Damage to a disk in the cervical spine. The damaged disk may then press on nearby nerve roots.    Degeneration from wear and tear, and aging. This can lead to narrowing (stenosis) of the openings between the vertebrae. The narrowed openings press on nerve roots as they leave the spinal canal.    An unstable spine. This is when a vertebra slips forward. It can then press on a nerve root.  There are other, less common causes of pressure on nerves in the neck. These include infection, cysts, and tumors.  Symptoms of cervical radiculopathy  These include:    Neck pain    Pain, numbness, tingling, or weakness that travels down the arm    Loss of neck movement    Muscle spasms  Treatment for cervical  radiculopathy  In most cases, your healthcare provider will first try treatments that help relieve symptoms. These may include:    Prescription or over-the-counter pain medicines. These help relieve pain and swelling.    Cold packs. These help reduce pain.    Resting. This involves avoiding positions and activities that increase pain.    Neck brace (cervical collar). This can help relieve inflammation and pain.    Physical therapy, including exercises and stretches. This can help decrease pain and increase movement and function.    Shots of medicinesaround the nerve roots. This is done to help relieve symptoms for a time.  In some cases, your healthcare provider may advise surgery to fix the underlying problem. This depends on the cause, the symptoms, and how long the pain has lasted.  Possible complications  Over time, an irritated and inflamed nerve may become damaged. This may lead to long-lasting (permanent) numbness or weakness. If symptoms change suddenly or get worse, be sure to let your healthcare provider know.     When to call your healthcare provider  Call your healthcare provider right away if you have any of these:    New pain or pain that gets worse    New or increasing weakness, numbness, or tingling in your arm or hand    Bowel or bladder changes   [employer] last reviewed this educational content on 3/10/2016    3299-6341 [employer]. [address] [place] [address], [place] [address]. All rights reserved. This information is not intended as a substitute for professional medical care. Always follow your healthcare professional's instructions.

## 2020-12-16 NOTE — PROGRESS NOTES
"Subjective     Millicent Celaya is a 40 year old female who presents to clinic today for the following health issues:    HPI         Hospital Follow-up Visit:    Hospital/Nursing Home/IP Rehab Facility: [facility]  Date of Admission: 12/11/2020  Date of Discharge: 12/13/2020   Reason(s) for Admission: Postprocedural intraabdominal abscess      Was your hospitalization related to COVID-19? No   Problems taking medications regularly:  {NONE DEFAULTED:443348::\"None\"}  Medication changes since discharge: {NONE DEFAULTED:022542::\"None\"}  Problems adhering to non-medication therapy:  {NONE DEFAULTED:107054::\"None\"}    Summary of hospitalization:  {HOSPITAL DISCHARGE SUMMARY INFO:989915::\"[facility] discharge summary reviewed\"}  Diagnostic Tests/Treatments reviewed.  Follow up needed: {NONE DEFAULTED:765256::\"none\"}  Other Healthcare Providers Involved in Patient s Care:         {those currently involved after discharge:978380::\"None\"}  Update since discharge: {IMPROVED DEFAULT:501346::\"improved.\"} {TIP  Include information from family/caregivers, SNF, Care Coordination :817387}      Post Discharge Medication Reconciliation: {ACO Med Rec (Provider):357709}.  Plan of care communicated with {Communicate Plan to:730847::\"patient\"}     {Reference  Coding guidelines- Moderate Complexity F2F/Video within 7 - 14 days of discharge 57990, High Complexity F2F/Video within 7 days 92800 or [id number] days 05011 :909865}         {additonal problems for provider to add (Optional):485455}    Review of Systems   {ROS COMP (Optional):040767}      Objective    LMP 11/19/2020 (Within Days)   There is no height or weight on file to calculate BMI.  Physical Exam   {Exam List (Optional):775654}    {Diagnostic Test Results (Optional):780381}        {PROVIDER CHARTING PREFERENCE:636630}    "

## 2020-12-16 NOTE — RESULT ENCOUNTER NOTE
Timbo!  Great news - your hemoglobin is up to an almost normal 11.7.  Glad we checked it today.  Best,  Dr. Francesca Padron MD / [facility]

## 2020-12-16 NOTE — PROGRESS NOTES
Subjective     Millicent Celaya is a 40 year old female who presents to clinic today for the following health issues:    HPI       right arm is getting worse since her surgery, numbness and tingling. She was doing some PT exercises and some otc medication with some heat with no relief. She is also using splint.     Pleasant 40 year old well known to me here today for:    1. Post hospital follow up: Had hysterectomy with Dr. meza 12/4.  Lower bowel and intestine fused to uterus.  Then abscess formed - fever with 104 to emergency room.  CT scan showed abscess.  Had sepsis.  They didn't drain - too dangerous to just drain. Would have to be rectal surgery.    Stayed for a few days.  On amoxicillin for 2 weeks.  And IV antibiotics as well.      Had to do blood transfusion.  Doesn't want to blood tests today.    2. Right arm pain and numbness.  No injury except was moving a lot things around the night before, and right wrist was mildly sore.  Then woke up first 3 fingers were completely numb.  Then had weird pain radiating up and down arm, only that day.  Since then if moves a certain way - gets painful electric shock going down arm.    Saw a doctor virtual visit who diagnosed carpal tunnel.  Wearing wrist brace - no improvement and pain goes up to shoulder/neck at times if moves the wrong way.    After surgery was worse.  Extending it feels like electric shock is going through arm.  Splint on all night.    Worried, debilitating.  Can't do normal things.  Right handed.      No history of similar.    No pain in neck or shoulder.  When moves arm out - gets electric shock up to neck.        Review of Systems   Constitutional, HEENT, cardiovascular, pulmonary, GI, , musculoskeletal, neuro, skin, endocrine and psych systems are negative, except as otherwise noted.      Objective    /70   Pulse 91   Temp 97.9  F (36.6  C) (Temporal)   Wt 71.8 kg (158 lb 3.2 oz)   LMP 11/19/2020 (Within Days)   SpO2 100%   BMI  28.94 kg/m    Body mass index is 28.94 kg/m .  Physical Exam   GENERAL: healthy, alert and no distress  NECK: no adenopathy, no asymmetry, masses, or scars and thyroid normal to palpation  MS: no gross musculoskeletal defects noted, no edema  SKIN: no suspicious lesions or rashes  PSYCH: mentation appears normal, affect normal/bright  Ortho Exam  Positive Spurling test right side          Assessment & Plan     Millicent was seen today for musculoskeletal problem.    Diagnoses and all orders for this visit:    Anemia due to blood loss, acute  Comments:  Recheck hgb today, improved dramatically from hospitalization!  No further follow up needed  Orders:  -     Cancel: **CBC with platelets FUTURE anytime; Future  -     Hemoglobin    Severe sepsis (H)  Comments:  Still on amoxicillin.  CT scan planned for 12/28 - follow up through Dr. Chapman  Orders:  -     Cancel: **CBC with platelets FUTURE anytime; Future    Median nerve compression  Comments:    Right side.  Doubt carpal tunnel as shoots all the way up arm to neck and positive Spurling test.    Cervical radiculopathy possible - will start with PT but low threshold if not improving quickly for MRI, referral sports med and possible MURALI if indicated   Orders:  -     [facility] HAND, AND CHIROPRACTIC REFERRAL; Future  -     gabapentin (NEURONTIN) 100 MG capsule; Take 1-3 capsules (100-300 mg) by mouth nightly as needed (pain at night)    Cervical radiculopathy (or shoulder?  Median nerve impingment)  -     gabapentin (NEURONTIN) 100 MG capsule; Take 1-3 capsules (100-300 mg) by mouth nightly as needed (pain at night)               Return in about 2 weeks (around 12/30/2020) for if worsening or not improving.    Francesca Padron MD  [facility]

## 2020-12-16 NOTE — PROGRESS NOTES
"NIRAV Celaya is a 40 year old female presents for post operative check. She is  12  day(s) status post TLH with extensive NIEVES.      Was readmitted this past weekend for pelvic abscess (fever, vaginal drainage after CT scan was done) WBC trended down and now taking Augmentin BID.  Continues to have vaginal drainage noted and intermittent lower abdominal pain that is very mild.  Had recent firm bowel movement followed by somewhat liquidy.    O.  Blood pressure (!) 138/91, pulse 102, temperature 98.4  F (36.9  C), temperature source Oral, height 1.575 m (5' 2\"), weight 73.9 kg (163 lb), last menstrual period 11/19/2020, SpO2 99 %, not currently breastfeeding.    Abd: soft, non-tender, non-distended. Incision clear, dry, and intact without evidence of infection.  Vaginal cuff is intact with stitches present and a small piece of granulation tissue on patient right side.  Small amount of serosanguineous discharge.    A. /P.  (T81.43XA) Postprocedural intraabdominal abscess  (primary encounter diagnosis)  Comment: After laparoscopic hyst  Plan: CT Abdomen Pelvis w Contrast        We will plan to repeat the CT scan 2 weeks after antibiotics were started which would be Monday, December 28.  Pelvic abscess should have resolved by then and vaginal drainage should be stopped.  She will send me a message if anything is worsening in the meantime.  Questions answered    Purnima Chapman MD    "

## 2020-12-17 LAB
BACTERIA SPEC CULT: NO GROWTH
BACTERIA SPEC CULT: NO GROWTH
SPECIMEN SOURCE: NORMAL
SPECIMEN SOURCE: NORMAL

## 2020-12-22 ENCOUNTER — THERAPY VISIT (OUTPATIENT)
Dept: PHYSICAL THERAPY | Facility: CLINIC | Age: 40
End: 2020-12-22
Attending: FAMILY MEDICINE
Payer: COMMERCIAL

## 2020-12-22 DIAGNOSIS — M54.12 RIGHT CERVICAL RADICULOPATHY: ICD-10-CM

## 2020-12-22 DIAGNOSIS — G56.00 MEDIAN NERVE COMPRESSION: ICD-10-CM

## 2020-12-22 PROCEDURE — 97161 PT EVAL LOW COMPLEX 20 MIN: CPT | Mod: GP | Performed by: PHYSICAL THERAPIST

## 2020-12-22 PROCEDURE — 97012 MECHANICAL TRACTION THERAPY: CPT | Mod: GP | Performed by: PHYSICAL THERAPIST

## 2020-12-22 PROCEDURE — 97110 THERAPEUTIC EXERCISES: CPT | Mod: GP | Performed by: PHYSICAL THERAPIST

## 2020-12-22 NOTE — PROGRESS NOTES
[facility] Initial Evaluation  Subjective:  The history is provided by the patient. No  was used.   Patient Health History  Millicent Celaya being seen for Extreme numbness and pain in right hand - thumb, index, and middle finger. Also pain radiating up arm when moved a certain way..     Problem began: 11/19/2020.   Problem occurred: Cleaning out a closet.   Pain is reported as 8/10 on pain scale.  General health as reported by patient is good.  Pertinent medical history includes: anemia.   Red flags:  None as reported by patient.  Medical allergies: none.   Surgeries include:  Other. Other surgery history details: Hysterectomy - December 4th 2020.    Current medications:  Anti-inflammatory, pain medication and other. Other medications details: Amoxicillin - internal pelvic abscess developed after recent surgery.    Current occupation is .   Primary job tasks include:  Computer work.                  Therapist Generated HPI Evaluation  Problem details: Extreme numbness in R thumb,2nd and 3rd digit completely and some in 4th digit.  Started with pain at wrist after cleaning a closet on 11/19/20 and then woke up that night with numbness in those fingers and it hasn't stopped since.  Also, had a hysterectomy on 12/4 and then started getting sharp pains in the first 3 digits.   Reaching arm out to don/doff clothes causes electric currents to go through her arm along the medial side.  Denies pain or symptoms in shoulder or neck.  .           This is a new condition.  Condition occurred with:  Repetition/overuse.  Where condition occurred: at home.  Site of Pain: R wrist/fingers.  Pain is described as burning and is constant.  Pain radiates to:  Lower arm, elbow and upper arm. Pain is worse during the night.  Since onset symptoms are gradually worsening.  Associated symptoms:  Loss of strength, tingling and numbness. Symptoms are exacerbated by rest  (reaching, sleeping)  and relieved by activity/movement.    Past treatment: is taking 3 gabbapenton/day and this has no effect so far.   Work activity restrictions: Currently off work due to hysterectomy until 1/13/21, but the little typing she has done, was very hard and painful.                          Objective:  Standing Alignment:    Cervical/Thoracic:  Normal  Shoulder/UE:  Normal                                  Cervical/Thoracic Evaluation    AROM:  AROM Cervical:    Flexion:            WNL  Extension:       75% tingly  Rotation:         Left: WNL     Right: WNL+ increased tingling  Side Bend:      Left: WNL     Right:  WNL+ increased tingling    Strength:  strength: 58L, 9R(R hand dominant)    Cervical Myotomes:  normal                    Neural Tension:      Left side:  Ulnar and Median  negative.  Right side:  Ulnar and Median positive.    Cervical Palpation:  normal                   Shoulder Evaluation:  ROM:  AROM:    Flexion:  Left:  180    Right:  100    Abduction:  Left: 180   Right:  80                                             Special Testing:      Right wrist/elbow positive for the following special tests: Tinel's and Phalen's                                  General     ROS    Assessment/Plan:    Patient is a 40 year old female with Right arm complaints.    Patient has the following significant findings with corresponding treatment plan.                Diagnosis 1:  R median nerve impingment and possible carpal tunnel  Pain -  hot/cold therapy, mechanical traction, manual therapy, self management, education, directional preference exercise and home program  Decreased ROM/flexibility - manual therapy, therapeutic exercise and home program  Decreased strength - therapeutic exercise, therapeutic activities and home program  Inflammation - cold therapy and self management/home program  Impaired muscle performance - neuro re-education and home program  Decreased function - therapeutic  activities and home program    Therapy Evaluation Codes:   1) History comprised of:   Personal factors that impact the plan of care:      None.    Comorbidity factors that impact the plan of care are:      None.     Medications impacting care: None.  2) Examination of Body Systems comprised of:   Body structures and functions that impact the plan of care:      Cervical spine, Fingers, Hand and Shoulder.   Activity limitations that impact the plan of care are:      Cooking, Driving, Dressing, Grasping, Lifting, Reading/Computer work, Working and Sleeping.  3) Clinical presentation characteristics are:   Evolving/Changing.  4) Decision-Making    Low complexity using standardized patient assessment instrument and/or measureable assessment of functional outcome.  Cumulative Therapy Evaluation is: Low complexity.    Previous and current functional limitations:  (See Goal Flow Sheet for this information)    Short term and Long term goals: (See Goal Flow Sheet for this information)     Communication ability:  Patient appears to be able to clearly communicate and understand verbal and written communication and follow directions correctly.  Treatment Explanation - The following has been discussed with the patient:   RX ordered/plan of care  Anticipated outcomes  Possible risks and side effects  This patient would benefit from PT intervention to resume normal activities.   Rehab potential is good.    Frequency:  2 X week, once daily  Duration:  for 4 weeks tapering to 1 X a week over 8 weeks  Discharge Plan:  Achieve all LTG.  Independent in home treatment program.  Reach maximal therapeutic benefit.    Please refer to the daily flowsheet for treatment today, total treatment time and time spent performing 1:1 timed codes.

## 2020-12-24 ENCOUNTER — MYC MEDICAL ADVICE (OUTPATIENT)
Dept: FAMILY MEDICINE | Facility: CLINIC | Age: 40
End: 2020-12-24

## 2020-12-28 ENCOUNTER — HOSPITAL ENCOUNTER (OUTPATIENT)
Dept: CT IMAGING | Facility: CLINIC | Age: 40
Discharge: HOME | End: 2020-12-28
Attending: OBSTETRICS & GYNECOLOGY | Admitting: OBSTETRICS & GYNECOLOGY
Payer: COMMERCIAL

## 2020-12-28 DIAGNOSIS — K65.1 POSTPROCEDURAL INTRAABDOMINAL ABSCESS (H): ICD-10-CM

## 2020-12-28 DIAGNOSIS — T81.43XA POSTPROCEDURAL INTRAABDOMINAL ABSCESS (H): ICD-10-CM

## 2020-12-28 PROCEDURE — 250N000011 HC RX IP 250 OP 636: Performed by: OBSTETRICS & GYNECOLOGY

## 2020-12-28 PROCEDURE — 250N000009 HC RX 250: Performed by: OBSTETRICS & GYNECOLOGY

## 2020-12-28 PROCEDURE — 74177 CT ABD & PELVIS W/CONTRAST: CPT | Mod: 26 | Performed by: RADIOLOGY

## 2020-12-28 PROCEDURE — 74177 CT ABD & PELVIS W/CONTRAST: CPT

## 2020-12-28 RX ORDER — IOPAMIDOL 755 MG/ML
100 INJECTION, SOLUTION INTRAVASCULAR ONCE
Status: COMPLETED | OUTPATIENT
Start: 2020-12-28 | End: 2020-12-28

## 2020-12-28 RX ADMIN — SODIUM CHLORIDE 59 ML: 9 INJECTION, SOLUTION INTRAVENOUS at 10:44

## 2020-12-28 RX ADMIN — IOPAMIDOL 78 ML: 755 INJECTION, SOLUTION INTRAVENOUS at 10:43

## 2020-12-28 RX ADMIN — LIDOCAINE HYDROCHLORIDE 0.2 ML: 10 INJECTION, SOLUTION EPIDURAL; INFILTRATION; INTRACAUDAL; PERINEURAL at 10:45

## 2020-12-28 ASSESSMENT — ENCOUNTER SYMPTOMS: FEVER: 1

## 2020-12-28 NOTE — TELEPHONE ENCOUNTER
Can be seen in walkin sports med clinic if in a lot of discomfort to get best options on what to do next

## 2020-12-31 ENCOUNTER — THERAPY VISIT (OUTPATIENT)
Dept: PHYSICAL THERAPY | Facility: CLINIC | Age: 40
End: 2020-12-31
Payer: COMMERCIAL

## 2020-12-31 DIAGNOSIS — M54.12 RIGHT CERVICAL RADICULOPATHY: ICD-10-CM

## 2020-12-31 PROCEDURE — 97140 MANUAL THERAPY 1/> REGIONS: CPT | Mod: GP | Performed by: PHYSICAL THERAPIST

## 2020-12-31 PROCEDURE — 97012 MECHANICAL TRACTION THERAPY: CPT | Mod: GP | Performed by: PHYSICAL THERAPIST

## 2020-12-31 PROCEDURE — 97110 THERAPEUTIC EXERCISES: CPT | Mod: GP | Performed by: PHYSICAL THERAPIST

## 2021-01-04 ENCOUNTER — MYC MEDICAL ADVICE (OUTPATIENT)
Dept: FAMILY MEDICINE | Facility: CLINIC | Age: 41
End: 2021-01-04

## 2021-01-04 ENCOUNTER — TELEPHONE (OUTPATIENT)
Dept: FAMILY MEDICINE | Facility: CLINIC | Age: 41
End: 2021-01-04

## 2021-01-04 DIAGNOSIS — M54.10 RADICULOPATHY, UNSPECIFIED SPINAL REGION: Primary | ICD-10-CM

## 2021-01-04 NOTE — TELEPHONE ENCOUNTER
----- Message from Cheryl Muñiz, PT sent at 12/31/2020 11:54 AM CST -----  Regarding: Pain  Hi Dr montoya.  So Timbo is not doing well.  I'm concerned with the severity of her symptoms and it does not seem to be coming from her neck.  I think she will probably need an EMG or other test to determine where her nerve compression is coming from.  Unfortunately I do not have a good referral specialist for carpal tunnel as I typically do not see many of these patients.  Hopefully you have someone in mind?  I think she should be seen for further examination as soon as possible though.    Thanks so much,  Cheryl Muñiz, MPT

## 2021-01-04 NOTE — TELEPHONE ENCOUNTER
Can you call Timbo and let her know I just got the message below from her physical therapy, and placed an urgent referral to our sports med doctors?    I'd like her to be seen within the next 24-48 hours as not improving with physical therapy and her physical therapist was quite concerned.    She could also do walk in at [facility] or the .    Thank you,  Dr. Francesca Padron MD / [facility]

## 2021-01-04 NOTE — TELEPHONE ENCOUNTER
Left message to call back and ask to speak with an available triage nurse.    MyChart message sent to patient.    MARY Ardon, RN  [facility]

## 2021-01-06 ENCOUNTER — ANCILLARY PROCEDURE (OUTPATIENT)
Dept: GENERAL RADIOLOGY | Facility: CLINIC | Age: 41
End: 2021-01-06
Attending: FAMILY MEDICINE
Payer: COMMERCIAL

## 2021-01-06 ENCOUNTER — OFFICE VISIT (OUTPATIENT)
Dept: ORTHOPEDICS | Facility: CLINIC | Age: 41
End: 2021-01-06
Attending: FAMILY MEDICINE
Payer: COMMERCIAL

## 2021-01-06 VITALS — BODY MASS INDEX: 28.52 KG/M2 | WEIGHT: 155 LBS | HEIGHT: 62 IN

## 2021-01-06 DIAGNOSIS — M54.10 RADICULOPATHY, UNSPECIFIED SPINAL REGION: ICD-10-CM

## 2021-01-06 DIAGNOSIS — M25.531 RIGHT WRIST PAIN: Primary | ICD-10-CM

## 2021-01-06 PROCEDURE — 99203 OFFICE O/P NEW LOW 30 MIN: CPT | Performed by: FAMILY MEDICINE

## 2021-01-06 PROCEDURE — 72040 X-RAY EXAM NECK SPINE 2-3 VW: CPT | Performed by: RADIOLOGY

## 2021-01-06 RX ORDER — PREDNISONE 20 MG/1
40 TABLET ORAL DAILY
Qty: 10 TABLET | Refills: 0 | Status: SHIPPED | OUTPATIENT
Start: 2021-01-06 | End: 2021-01-11

## 2021-01-06 ASSESSMENT — MIFFLIN-ST. JEOR: SCORE: 1321.33

## 2021-01-06 NOTE — PROGRESS NOTES
SPORTS & ORTHOPEDIC WALK-IN VISIT 1/6/2021    Primary Care Physician: Dr. Padron    Here today with right wrist pain. 11/19/20 - Was cleaning a closet and noticed volar R wrist pain. Later that night, woke up with more significant forearm pain and significant numbness in her 1-4th fingers.  Has tried a splint, did virtual visit, dx with acute carpal tunnel syndrome.   Was referred to hand therapy by pcp. Seemed to make worse. also prescribed gabapentin that did not help. Has been taking tylenol pm at night to help with sleep. Has also tried nsaids which didn't help.     With certain ADLs, she will notice increased symptoms radiating up her arm, sometimes to her neck.     Gabapentin did not help.  Has been icing wrist.    Difficulty with making a fist. Has difficulty with wrist extension.    Reason for visit:     What part of your body is injured / painful?  R  wrist    What caused the injury /pain? Repetitive motion, cleaning    How long ago did your injury occur or pain begin? several months ago    What are your most bothersome symptoms? Pain, Numbness and Tingling    How would you characterize your symptom?  aching and sharp    What makes your symptoms better? Other: pain medication    What makes your symptoms worse? Movement    Have you been previously seen for this problem? Yes, PCP    Medical History:    Any recent changes to your medical history? No    Any new medication prescribed since last visit? No    Have you had surgery on this body part before? No    Social History:    Occupation:     Handedness: Right    Exercise: walking, pillates    Review of Systems:    Do you have fever, chills, weight loss? No    Do you have any vision problems? No    Do you have any chest pain or edema? No    Do you have any shortness of breath or wheezing?  No    Do you have stomach problems? No    Do you have any numbness or focal weakness? Yes, R hand    Do you have diabetes? No    Do you have  "problems with bleeding or clotting? No    Do you have an rashes or other skin lesions? No         Past Medical History, Current Medications, and Allergies are reviewed in the electronic medical record as appropriate.       EXAM:Ht 1.575 m (5' 2\")   Wt 70.3 kg (155 lb)   BMI 28.35 kg/m      General  - alert, pleasant, no distress  CV  - normal radial pulse, cap refill brisk  Musculoskeletal - right wrist  - inspection: normal joint alignment, no obvious deformity, no swelling  - palpation: no bony or soft tissue tenderness, no tenderness at the anatomical snuffbox  - ROM:  90 deg flexion   70 deg extension, pain with passive extension   25 deg abduction   65 deg adduction  - strength: 5/5  strength, 5/5 flexion, extension, pronation, supination, adduction, abduction  - special tests:  (-) Tinel's  (-) Finkelstein  (-) Phalen  (-) Villar click test  (-) ulnar impaction  Neuro  - no sensory or motor deficit, grossly normal coordination, normal muscle tone  Skin  - no ecchymosis, erythema, warmth, or induration, no obvious rash      Median nerve 12 mm squared on U/S    Imagin view xrays of cervical spine performed and reviewed independently demonstrating loss of cervical lordosis, loss of disc height from C5-C7. See EMR for formal radiology report.         Assessment: Patient is a 40 year old female with right wrist pain and paresthesia. Initially felt to be carpal tunnel, though has some evidence of cervical spine djd and radicular sx to neck that suggest cervical spine etiology.     Recommendations:   Reviewed imaging and assessment with the patient in detail  Recommended continued splinting at night. Along with course of prednisone. She will follow up in one week. If improved, will consider hand therapy. If unchanged may consider injection if carpal tunnel seems most likely, or MRI of cervical spine. If equivocal, possibly EMG.     Ren Altamirano MD            "

## 2021-01-13 ENCOUNTER — OFFICE VISIT (OUTPATIENT)
Dept: ORTHOPEDICS | Facility: CLINIC | Age: 41
End: 2021-01-13
Payer: COMMERCIAL

## 2021-01-13 VITALS — HEIGHT: 66 IN | WEIGHT: 155 LBS | BODY MASS INDEX: 24.91 KG/M2

## 2021-01-13 DIAGNOSIS — M25.531 RIGHT WRIST PAIN: Primary | ICD-10-CM

## 2021-01-13 PROCEDURE — 99213 OFFICE O/P EST LOW 20 MIN: CPT | Performed by: FAMILY MEDICINE

## 2021-01-13 RX ORDER — PREDNISONE 10 MG/1
TABLET ORAL
Qty: 23 TABLET | Refills: 0 | Status: SHIPPED | OUTPATIENT
Start: 2021-01-13 | End: 2021-01-25

## 2021-01-13 ASSESSMENT — MIFFLIN-ST. JEOR: SCORE: 1384.83

## 2021-01-13 NOTE — PROGRESS NOTES
"      SPORTS & ORTHOPEDIC WALK-IN FOLLOW-UP VISIT 1/13/2021    Interval History:     Follow up reason: R wrist pain and paresthesia     Date of injury/onset: 11/19/20    Date last seen: 1/6/21    Following Therapeutic Plan: Yes     Pain: Improving    Function: Improving    Interval History: Prednisone has improved some pain.  The numbness is less noticeable.  She has less pain with daily activities such as reaching overhead and behind her back.  Is noting more stiffness.  Swelling is gone down.  Still having pain with use of the hand but overall improved.    Medical History:    Any recent changes to your medical history? No    Any new medication prescribed since last visit? No    Review of Systems:    Do you have fever, chills, weight loss? No    Do you have any vision problems? No    Do you have any chest pain or edema? No    Do you have any shortness of breath or wheezing?  No    Do you have stomach problems? No    Do you have any numbness or focal weakness? Yes, R hand     Do you have diabetes? No    Do you have problems with bleeding or clotting? No    Do you have an rashes or other skin lesions? No           Past Medical History, Current Medications, and Allergies are reviewed in the electronic medical record as appropriate.       EXAM:Ht 1.676 m (5' 6\")   Wt 70.3 kg (155 lb)   BMI 25.02 kg/m      General: Alert, pleasant, no distress  Right wrist: warm, well perfused, reports reduced sensation to light touch over the palmar aspect of the thumb index and middle fingers compared to the contralateral side.     Inspection: No swelling, ecchymosis, deformity     ROM: Global stiffness noted, limited to roughly 45 degrees extension, 60 degrees flexion passively compared to 90 degrees flexion, 70 degrees extension on the contralateral side.     Strength: Intact throughout though has some pain with flexion extension of the fingers.     Palpation: No pain to palpation     Special Tests: None      Imaging: No new " imaging.      Assessment: Patient is a 40 year old female with right wrist pain that was initially concerning for carpal tunnel versus cervical radiculopathy.  Improved on prednisone though having more wrist stiffness due to disuse and bracing.    Recommendations:   Reviewed imaging and assessment with the patient in detail  Discussed we could try carpal tunnel injection as it seems more likely that this is related to median nerve impingement at the wrist versus oral steroid with taper.  After discussion of the risks and benefits of each the patient would like to pursue oral steroid with taper.  Prescription was sent to the pharmacy.  She will continue with hand therapy.  She will contact us if she would like to pursue an injection.    Ren Altamirano MD             22-Dec-2020 01:22 22-Dec-2020 01:23

## 2021-01-13 NOTE — LETTER
"    1/13/2021         RE: Millicent Celaya  [address]  [address]  [facility] [address]        Dear Colleague,    Thank you for referring your patient, Millicent Celaya, to the [facility] [place]. Please see a copy of my visit note below.          SPORTS & ORTHOPEDIC WALK-IN FOLLOW-UP VISIT 1/13/2021    Interval History:     Follow up reason: R wrist pain and paresthesia     Date of injury/onset: 11/19/20    Date last seen: 1/6/21    Following Therapeutic Plan: Yes     Pain: Improving    Function: Improving    Interval History: Prednisone has improved some pain.  The numbness is less noticeable.  She has less pain with daily activities such as reaching overhead and behind her back.  Is noting more stiffness.  Swelling is gone down.  Still having pain with use of the hand but overall improved.    Medical History:    Any recent changes to your medical history? No    Any new medication prescribed since last visit? No    Review of Systems:    Do you have fever, chills, weight loss? No    Do you have any vision problems? No    Do you have any chest pain or edema? No    Do you have any shortness of breath or wheezing?  No    Do you have stomach problems? No    Do you have any numbness or focal weakness? Yes, R hand     Do you have diabetes? No    Do you have problems with bleeding or clotting? No    Do you have an rashes or other skin lesions? No           Past Medical History, Current Medications, and Allergies are reviewed in the electronic medical record as appropriate.       EXAM:Ht 1.676 m (5' 6\")   Wt 70.3 kg (155 lb)   BMI 25.02 kg/m      General: Alert, pleasant, no distress  Right wrist: warm, well perfused, reports reduced sensation to light touch over the palmar aspect of the thumb index and middle fingers compared to the contralateral side.     Inspection: No swelling, ecchymosis, deformity     ROM: Global stiffness noted, limited to roughly 45 degrees extension, 60 " degrees flexion passively compared to 90 degrees flexion, 70 degrees extension on the contralateral side.     Strength: Intact throughout though has some pain with flexion extension of the fingers.     Palpation: No pain to palpation     Special Tests: None      Imaging: No new imaging.      Assessment: Patient is a 40 year old female with right wrist pain that was initially concerning for carpal tunnel versus cervical radiculopathy.  Improved on prednisone though having more wrist stiffness due to disuse and bracing.    Recommendations:   Reviewed imaging and assessment with the patient in detail  Discussed we could try carpal tunnel injection as it seems more likely that this is related to median nerve impingement at the wrist versus oral steroid with taper.  After discussion of the risks and benefits of each the patient would like to pursue oral steroid with taper.  Prescription was sent to the pharmacy.  She will continue with hand therapy.  She will contact us if she would like to pursue an injection.    Ren Altamirano MD

## 2021-01-14 ENCOUNTER — HEALTH MAINTENANCE LETTER (OUTPATIENT)
Age: 41
End: 2021-01-14

## 2021-01-26 ENCOUNTER — OFFICE VISIT (OUTPATIENT)
Dept: OBGYN | Facility: CLINIC | Age: 41
End: 2021-01-26
Payer: COMMERCIAL

## 2021-01-26 DIAGNOSIS — Z90.710 S/P HYSTERECTOMY: Primary | ICD-10-CM

## 2021-01-26 PROBLEM — D50.9 IRON DEFICIENCY ANEMIA: Status: RESOLVED | Noted: 2017-01-27 | Resolved: 2021-01-26

## 2021-01-26 PROBLEM — N92.4 EXCESSIVE BLEEDING IN PREMENOPAUSAL PERIOD: Status: RESOLVED | Noted: 2020-10-08 | Resolved: 2021-01-26

## 2021-01-26 PROBLEM — R87.810 CERVICAL HIGH RISK HPV (HUMAN PAPILLOMAVIRUS) TEST POSITIVE: Status: RESOLVED | Noted: 2020-10-07 | Resolved: 2021-01-26

## 2021-01-26 PROCEDURE — 99024 POSTOP FOLLOW-UP VISIT: CPT | Performed by: OBSTETRICS & GYNECOLOGY

## 2021-01-26 NOTE — Clinical Note
This patient had hysterectomy and cervix was normal.  I cannot get her Pap and HPV off health maintenance.  Can someone help me? lyle

## 2021-01-26 NOTE — PROGRESS NOTES
Phone visit for [facility] care during COVID.    S.  Millicent Celaya is a 40 year old female presents for post operative check. She is  7  week(s) status post Laparoscopic hysterectomy.  She reports doing well and denies significant pain or bleeding. Feeling completely normal. No further drainage since January.     Having more clear vaginal discharge noted and not sure if that is normal.  No odor or itching.  Has some dysuria with initial void of the day or if bladder is over distended.  States this is different and did not have this prior to surgery.  Does not happen all day long.    O.  not currently breastfeeding.     Incisions are completely healed now per pt    A. /P.  (Z90.710) S/P hysterectomy  (primary encounter diagnosis)  Comment: Endometriosis  Plan: Cervix was normal on pathology so discussed no further need for Pap smears.  I was unable to remove this from her health maintenance and will route to Pap nurses.    Discussed vaginal discharge is normal especially as she is not having menorrhagia and cramping.  She will monitor the urine and see me if not better in the next couple of months but discussed hydration and not letting the bladder get over distended.      Total time 14 min    Purnima Chapman MD

## 2021-01-27 NOTE — PROGRESS NOTES
Hi Dr. Chapman,   I was able to discontinue both the Pap and HPV on HM.  Christie Sands, [facility]-Pap Tracking

## 2021-01-28 ENCOUNTER — MYC MEDICAL ADVICE (OUTPATIENT)
Dept: FAMILY MEDICINE | Facility: CLINIC | Age: 41
End: 2021-01-28

## 2021-03-01 ENCOUNTER — VIRTUAL VISIT (OUTPATIENT)
Dept: FAMILY MEDICINE | Facility: CLINIC | Age: 41
End: 2021-03-01
Payer: COMMERCIAL

## 2021-03-01 DIAGNOSIS — Z12.31 ENCOUNTER FOR SCREENING MAMMOGRAM FOR BREAST CANCER: ICD-10-CM

## 2021-03-01 DIAGNOSIS — L70.0 CYSTIC ACNE: ICD-10-CM

## 2021-03-01 DIAGNOSIS — M54.10 RADICULOPATHY, UNSPECIFIED SPINAL REGION: Primary | ICD-10-CM

## 2021-03-01 PROCEDURE — 99213 OFFICE O/P EST LOW 20 MIN: CPT | Mod: 95 | Performed by: FAMILY MEDICINE

## 2021-03-01 RX ORDER — SPIRONOLACTONE 50 MG/1
50 TABLET, FILM COATED ORAL DAILY
Qty: 30 TABLET | Refills: 11 | Status: SHIPPED | OUTPATIENT
Start: 2021-03-01 | End: 2022-03-21

## 2021-03-01 RX ORDER — SPIRONOLACTONE 50 MG/1
50 TABLET, FILM COATED ORAL DAILY
Qty: 90 TABLET | Refills: 3 | Status: SHIPPED | OUTPATIENT
Start: 2021-03-01 | End: 2021-03-01

## 2021-03-01 NOTE — PROGRESS NOTES
"Timbo is a 40 year old who is being evaluated via a billable video visit.      How would you like to obtain your AVS? MyChart  If the video visit is dropped, the invitation should be resent by: Text to cell phone: [phone] (M)   Will anyone else be joining your video visit? No    Video Start Time: 7:13 AM    Assessment & Plan     Radiculopathy, unspecified spinal region    Much better!    Prednisone really helped.    Is doing physical therapy exercises, wearing brace still    Cystic acne    Well controlled on spironolactone daily.    BMP up to date.    Next visit 12.2021    Refilled  - spironolactone (ALDACTONE) 50 MG tablet; Take 1 tablet (50 mg) by mouth daily    Encounter for screening mammogram for breast cancer    Needs mammo; ordered.  She will schedule.  - MA Screening Digital Bilateral; Future         BMI:   Estimated body mass index is 25.02 kg/m  as calculated from the following:    Height as of 1/13/21: 1.676 m (5' 6\").    Weight as of 1/13/21: 70.3 kg (155 lb).       Return in about 9 months (around 12/1/2021) for Routine preventive.    Francesca Padron MD  [facility]    Subjective   Timbo is a 40 year old who presents for the following health issues     HPI     Prednisone - helped hand and arm pain.  Now functioning.   Didn't go back to physical therapy.  Plan - if it didn't work would do MRI and find localized.        Medication Followup of spironolactone (ALDACTONE) 50 MG tablet    Taking Medication as prescribed: yes    Side Effects:  None    Medication Helping Symptoms:  yes       Mammo -does she need now that 39?    Review of Systems   Constitutional, HEENT, cardiovascular, pulmonary, gi and gu systems are negative, except as otherwise noted.      Objective           Vitals:  No vitals were obtained today due to virtual visit.    Physical Exam   GENERAL: Healthy, alert and no distress  EYES: Eyes grossly normal to inspection.  No discharge or erythema, or obvious " scleral/conjunctival abnormalities.  RESP: No audible wheeze, cough, or visible cyanosis.  No visible retractions or increased work of breathing.    SKIN: Visible skin clear. No significant rash, abnormal pigmentation or lesions.  NEURO: Cranial nerves grossly intact.  Mentation and speech appropriate for age.  PSYCH: Mentation appears normal, affect normal/bright, judgement and insight intact, normal speech and appearance well-groomed.    basic metabolic panel  Last Comprehensive Metabolic Panel:  Sodium   Date Value Ref Range Status   12/13/2020 139 133 - 144 mmol/L Final     Potassium   Date Value Ref Range Status   12/13/2020 3.7 3.4 - 5.3 mmol/L Final     Chloride   Date Value Ref Range Status   12/13/2020 110 (H) 94 - 109 mmol/L Final     Carbon Dioxide   Date Value Ref Range Status   12/13/2020 26 20 - 32 mmol/L Final     Anion Gap   Date Value Ref Range Status   12/13/2020 3 3 - 14 mmol/L Final     Glucose   Date Value Ref Range Status   12/13/2020 108 (H) 70 - 99 mg/dL Final     Urea Nitrogen   Date Value Ref Range Status   12/13/2020 5 (L) 7 - 30 mg/dL Final     Creatinine   Date Value Ref Range Status   12/13/2020 0.74 0.52 - 1.04 mg/dL Final     GFR Estimate   Date Value Ref Range Status   12/13/2020 >90 >60 mL/min/[1.73_m2] Final     Comment:     Non  GFR Calc  Starting 12/18/2018, serum creatinine based estimated GFR (eGFR) will be   calculated using the Chronic Kidney Disease Epidemiology Collaboration   (CKD-EPI) equation.       Calcium   Date Value Ref Range Status   12/13/2020 8.7 8.5 - 10.1 mg/dL Final             Video-Visit Details    Type of service:  Video Visit    Video End Time:7:33 AM    Originating Location (pt. Location): Home    Distant Location (provider location):  [facility]     Platform used for Video Visit: Cell[employer]

## 2021-03-11 ENCOUNTER — MYC MEDICAL ADVICE (OUTPATIENT)
Dept: OBGYN | Facility: CLINIC | Age: 41
End: 2021-03-11

## 2021-03-11 NOTE — TELEPHONE ENCOUNTER
Patient had a hysterectomy on 12/04/2020. She has had pinkish discharge for 2 days (see mychart message) . Any concerns? Veronica Ngo RN

## 2021-03-12 NOTE — TELEPHONE ENCOUNTER
A small amount of discharge with that appearance can be very normal after a hysterectomy.  If it is a small amount and not causing any itching, burning, etc, and doesn't have a foul odor, ok to monitor.    Cydney Aguilera MD

## 2021-03-14 ENCOUNTER — HEALTH MAINTENANCE LETTER (OUTPATIENT)
Age: 41
End: 2021-03-14

## 2021-03-15 ENCOUNTER — TELEPHONE (OUTPATIENT)
Dept: OBGYN | Facility: CLINIC | Age: 41
End: 2021-03-15

## 2021-03-15 NOTE — TELEPHONE ENCOUNTER
==View-only below this line===      ----- Message -----       From:Millicent Celaya       Sent:3/11/2021  6:17 AM CST         To:TIFFANY CHAPMAN MD    Subject:RE: Appointment Request    Hello,    Is there a way for Dr. Chapman to give me a call? I have pinkish discharge vaginally (I have a picture I'd also like to send her, but cannot attach through [employer] messages).    I'm concerned because it has been close to 4 months since my hysterectomy. This started yesterday and every time I urinate, I have some of this colored discharge. No pain however. Not sure if this has to also do with the yeast infection I thought I had, which I took Monistat-1 for.    Like I said, I really would like to speak with the doctor about this; It worries me and I need to know if this is normal or not.    Thanks     Timbo Celaya       ----- Message -----       From:Nneka BEST       Sent:3/2/2021  1:42 PM CST         To:Millicent Celaya    Subject:RE: Appointment Request    No I'm sorry she is not here on the 5th or 6th the she is here on 4/1/2021 and this is all that is available right now 11:15, 2:30, 2:45, 3:15. The next date after the April 1st would be April 13th.       ----- Message -----       From:Millicent Celaya       Sent:3/2/2021  1:22 PM CST         To:TIFFANY CHAPMAN MD    Subject:RE: Appointment Request    I'd still like to have a visit with her - Is there anything available on April 5th or 6th?    I mentioned in a previous pain during urination after my surgery and want to discuss this, as it is still going on.     Thanks       ----- Message -----       From:Nneka BEST       Sent:3/2/2021 12:23 PM CST         To:Millicent Celaya    Subject:RE: Appointment Request    I'm sorry Timbo, but to schedule this visit with OBGYN Dr Chapman would not be until 4/1/2021       ----- Message -----       From:Millicent Celaya       Sent:3/1/2021  3:28 PM CST         To:TIFFANY CHAPMAN MD    Subject:Appointment  Request    Appointment Request From: Millicent Celaya    With Provider: TIFFANY ASH MD [[facility]]    Preferred Date Range: Any

## 2021-03-22 NOTE — TELEPHONE ENCOUNTER
Can we get her in to see me at  this week or next? (11:15 on 3/25 would work, etc)    She probably has some granulation tissue at the cuff and I will need to put some silver nitrate on that to get rid of it.     Thanks  Purnima Chapman MD

## 2021-04-13 ENCOUNTER — OFFICE VISIT (OUTPATIENT)
Dept: OBGYN | Facility: CLINIC | Age: 41
End: 2021-04-13
Payer: COMMERCIAL

## 2021-04-13 VITALS
SYSTOLIC BLOOD PRESSURE: 118 MMHG | WEIGHT: 144.8 LBS | DIASTOLIC BLOOD PRESSURE: 86 MMHG | BODY MASS INDEX: 23.37 KG/M2 | TEMPERATURE: 98.6 F

## 2021-04-13 DIAGNOSIS — Z90.710 S/P HYSTERECTOMY: Primary | ICD-10-CM

## 2021-04-13 PROBLEM — T81.43XA POSTPROCEDURAL INTRAABDOMINAL ABSCESS (H): Status: RESOLVED | Noted: 2020-12-11 | Resolved: 2021-04-13

## 2021-04-13 PROBLEM — K65.1 POSTPROCEDURAL INTRAABDOMINAL ABSCESS (H): Status: RESOLVED | Noted: 2020-12-11 | Resolved: 2021-04-13

## 2021-04-13 LAB
ALBUMIN UR-MCNC: NEGATIVE MG/DL
APPEARANCE UR: CLEAR
BACTERIA #/AREA URNS HPF: ABNORMAL /HPF
BILIRUB UR QL STRIP: NEGATIVE
COLOR UR AUTO: YELLOW
GLUCOSE UR STRIP-MCNC: NEGATIVE MG/DL
HGB UR QL STRIP: NEGATIVE
KETONES UR STRIP-MCNC: NEGATIVE MG/DL
LEUKOCYTE ESTERASE UR QL STRIP: NEGATIVE
NITRATE UR QL: NEGATIVE
NON-SQ EPI CELLS #/AREA URNS LPF: ABNORMAL /LPF
PH UR STRIP: 6.5 PH (ref 5–7)
RBC #/AREA URNS AUTO: ABNORMAL /HPF
SOURCE: ABNORMAL
SP GR UR STRIP: 1.02 (ref 1–1.03)
UROBILINOGEN UR STRIP-ACNC: 0.2 EU/DL (ref 0.2–1)
WBC #/AREA URNS AUTO: ABNORMAL /HPF

## 2021-04-13 PROCEDURE — 81001 URINALYSIS AUTO W/SCOPE: CPT | Performed by: OBSTETRICS & GYNECOLOGY

## 2021-04-13 PROCEDURE — 99212 OFFICE O/P EST SF 10 MIN: CPT | Performed by: OBSTETRICS & GYNECOLOGY

## 2021-04-13 NOTE — PROGRESS NOTES
NIRAV Celaya is a 40 year old female presents for post operative check. She is  4  month(s) status post TLH.  Had complication with cuff abscess but that has resolved. Has had a pinkish discharge vaginally on and off, has not seen it recently. Also am urine is still very concentrated but seems overall to be improving    O.  Blood pressure 118/86, temperature 98.6  F (37  C), temperature source Oral, weight 65.7 kg (144 lb 12.8 oz), last menstrual period 11/19/2020, not currently breastfeeding.    Vaginal cuff is well-healed with small area of granulation tissue at the left fornix.  Xylocaine jelly was applied and then silver nitrate used to ablate the granulation tissue.  Patient tolerated well.  No bleeding    A. /P.  (Z90.710) S/P hysterectomy  (primary encounter diagnosis)  Comment: Small granulation tissue  Plan: Discussed how this can be normal especially after her hysterectomy with cuff abscess.  She will let me know if has any further vaginal bleeding or spotting.       Follow up prn or when due for next annual exam.    Purnima Chapman MD

## 2021-07-13 ENCOUNTER — OFFICE VISIT (OUTPATIENT)
Dept: OBGYN | Facility: CLINIC | Age: 41
End: 2021-07-13
Payer: COMMERCIAL

## 2021-07-13 VITALS
DIASTOLIC BLOOD PRESSURE: 88 MMHG | HEIGHT: 66 IN | WEIGHT: 133.6 LBS | SYSTOLIC BLOOD PRESSURE: 118 MMHG | BODY MASS INDEX: 21.47 KG/M2

## 2021-07-13 DIAGNOSIS — N63.20 LEFT BREAST LUMP: Primary | ICD-10-CM

## 2021-07-13 DIAGNOSIS — R63.4 WEIGHT LOSS: ICD-10-CM

## 2021-07-13 PROCEDURE — 36415 COLL VENOUS BLD VENIPUNCTURE: CPT | Performed by: OBSTETRICS & GYNECOLOGY

## 2021-07-13 PROCEDURE — 84443 ASSAY THYROID STIM HORMONE: CPT | Performed by: OBSTETRICS & GYNECOLOGY

## 2021-07-13 PROCEDURE — 87389 HIV-1 AG W/HIV-1&-2 AB AG IA: CPT | Performed by: OBSTETRICS & GYNECOLOGY

## 2021-07-13 PROCEDURE — 99213 OFFICE O/P EST LOW 20 MIN: CPT | Performed by: OBSTETRICS & GYNECOLOGY

## 2021-07-13 PROCEDURE — 86481 TB AG RESPONSE T-CELL SUSP: CPT | Performed by: OBSTETRICS & GYNECOLOGY

## 2021-07-13 PROCEDURE — 80053 COMPREHEN METABOLIC PANEL: CPT | Performed by: OBSTETRICS & GYNECOLOGY

## 2021-07-13 ASSESSMENT — MIFFLIN-ST. JEOR: SCORE: 1282.76

## 2021-07-13 NOTE — PROGRESS NOTES
"CC: Breast lump  HPI:  Millicent Celaya is a 41 year old female Patient's last menstrual period was 11/19/2020 (within days).  Patient does self breast exams in the shower and a few months ago, noted left breast mass without pain.  Has not had mammogram done yet.  Wanted to get this examined today.    Of note the patient has lost 30 pounds since December and is not eating differently.  She is not actively trying to lose weight.  No change in bowel movements, appetite, stool, cough or any other symptoms.  Denies feeling jittery or issues sleeping.  Unsure why she is lost weight.    Allergies: Patient has no known allergies.    EXAM:  Blood pressure 118/88, height 1.676 m (5' 6\"), weight 60.6 kg (133 lb 9.6 oz), last menstrual period 11/19/2020, not currently breastfeeding.  General - pleasant female in no acute distress.  Breasts-left breast with about 3 cm mass at the 2 o'clock position 1 cm from areola.  Psychiactric - appropriate mood and affect  Neurological - alert and oriented X 3      ASSESSMENT/PLAN:  (N63.20) Left breast lump  (primary encounter diagnosis)  Comment: About 3 cm  Plan: US Breast Left Complete 4 Quadrants, MA         Diagnostic Digital Bilateral        We will send for diagnostic mammogram and breast ultrasound.  Discussed she may need a breast biopsy.    (R63.4) Weight loss  Comment: 30 pounds since December  Plan: CBC with platelets, TSH with free T4 reflex,         HIV Antigen Antibody Combo, Quantiferon TB Gold        Plus, Comprehensive metabolic panel (BMP + Alb,        Alk Phos, ALT, AST, Total. Bili, TP)        Check labs today and will be fairly reassured if all are normal.      Purnima Chapman MD    "

## 2021-07-14 LAB
ALBUMIN SERPL-MCNC: 4.2 G/DL (ref 3.4–5)
ALP SERPL-CCNC: 92 U/L (ref 40–150)
ALT SERPL W P-5'-P-CCNC: 21 U/L
ANION GAP SERPL CALCULATED.3IONS-SCNC: 9 MMOL/L (ref 3–14)
AST SERPL W P-5'-P-CCNC: 20 U/L (ref 0–45)
BILIRUB SERPL-MCNC: 1.8 MG/DL (ref 0.2–1.3)
BUN SERPL-MCNC: 11 MG/DL (ref 7–30)
CALCIUM SERPL-MCNC: 9.2 MG/DL (ref 8.5–10.1)
CHLORIDE BLD-SCNC: 103 MMOL/L
CO2 SERPL-SCNC: 24 MMOL/L (ref 20–32)
CREAT SERPL-MCNC: 0.75 MG/DL
GFR SERPL CREATININE-BSD FRML MDRD: >90 ML/MIN/1.73M2
GLUCOSE BLD-MCNC: 86 MG/DL (ref 70–99)
HIV 1+2 AB+HIV1 P24 AG SERPL QL IA: NONREACTIVE
POTASSIUM BLD-SCNC: 3.6 MMOL/L (ref 3.4–5.3)
PROT SERPL-MCNC: 7.7 G/DL (ref 6.8–8.8)
SODIUM SERPL-SCNC: 136 MMOL/L (ref 133–144)
TSH SERPL DL<=0.005 MIU/L-ACNC: 2.98 MU/L (ref 0.4–4)

## 2021-07-16 LAB
GAMMA INTERFERON BACKGROUND BLD IA-ACNC: 0.07 IU/ML
M TB IFN-G BLD-IMP: NEGATIVE
M TB IFN-G CD4+ BCKGRND COR BLD-ACNC: 4.47 IU/ML
MITOGEN IGNF BCKGRD COR BLD-ACNC: -0.01 IU/ML
MITOGEN IGNF BCKGRD COR BLD-ACNC: -0.02 IU/ML
QUANTIFERON MITOGEN: 4.54 IU/ML
QUANTIFERON NIL TUBE: 0.07 IU/ML
QUANTIFERON TB1 TUBE: 0.05 IU/ML
QUANTIFERON TB2 TUBE: 0.06

## 2021-07-20 ENCOUNTER — HOSPITAL ENCOUNTER (OUTPATIENT)
Dept: MAMMOGRAPHY | Facility: CLINIC | Age: 41
End: 2021-07-20
Attending: OBSTETRICS & GYNECOLOGY
Payer: COMMERCIAL

## 2021-07-20 DIAGNOSIS — N63.20 LEFT BREAST LUMP: ICD-10-CM

## 2021-07-20 PROCEDURE — 77062 BREAST TOMOSYNTHESIS BI: CPT

## 2021-07-20 PROCEDURE — 76642 ULTRASOUND BREAST LIMITED: CPT | Mod: LT

## 2021-07-26 ENCOUNTER — HOSPITAL ENCOUNTER (OUTPATIENT)
Dept: MAMMOGRAPHY | Facility: CLINIC | Age: 41
End: 2021-07-26
Attending: OBSTETRICS & GYNECOLOGY
Payer: COMMERCIAL

## 2021-07-26 DIAGNOSIS — N63.20 LEFT BREAST LUMP: ICD-10-CM

## 2021-07-26 PROCEDURE — 272N000031 US BREAST BIOPSY CORE NEEDLE LEFT

## 2021-07-26 PROCEDURE — 999N000065 MA POST PROCEDURE LEFT

## 2021-07-26 PROCEDURE — 250N000009 HC RX 250: Performed by: RADIOLOGY

## 2021-07-26 PROCEDURE — 88305 TISSUE EXAM BY PATHOLOGIST: CPT | Mod: TC | Performed by: OBSTETRICS & GYNECOLOGY

## 2021-07-26 RX ADMIN — LIDOCAINE HYDROCHLORIDE 5 ML: 10 INJECTION, SOLUTION INFILTRATION; PERINEURAL at 09:44

## 2021-07-26 NOTE — DISCHARGE INSTRUCTIONS

## 2021-07-27 ENCOUNTER — TELEPHONE (OUTPATIENT)
Dept: MAMMOGRAPHY | Facility: CLINIC | Age: 41
End: 2021-07-27

## 2021-07-27 LAB
PATH REPORT.COMMENTS IMP SPEC: NORMAL
PATH REPORT.COMMENTS IMP SPEC: NORMAL
PATH REPORT.FINAL DX SPEC: NORMAL
PATH REPORT.GROSS SPEC: NORMAL
PATH REPORT.MICROSCOPIC SPEC OTHER STN: NORMAL
PHOTO IMAGE: NORMAL

## 2021-07-27 PROCEDURE — 88305 TISSUE EXAM BY PATHOLOGIST: CPT | Mod: 26 | Performed by: PATHOLOGY

## 2021-07-27 NOTE — TELEPHONE ENCOUNTER
After review by Breast Center Radiologist, Dr. Marco Antonio Easley, Ms. Celaya returned my call,  verified, and was given her 2021 Left Breast Biopsy results (Stromal Fibrosis) and recommended Follow up (6 month follow up LEFT breast ultrasound).  Biopsy site without issues or concerns.   I encouraged her to contact her doctor with any further breast concerns.

## 2021-10-24 ENCOUNTER — HEALTH MAINTENANCE LETTER (OUTPATIENT)
Age: 41
End: 2021-10-24

## 2022-02-13 ENCOUNTER — HEALTH MAINTENANCE LETTER (OUTPATIENT)
Age: 42
End: 2022-02-13

## 2022-07-18 ENCOUNTER — OFFICE VISIT (OUTPATIENT)
Dept: FAMILY MEDICINE | Facility: CLINIC | Age: 42
End: 2022-07-18
Payer: COMMERCIAL

## 2022-07-18 VITALS
WEIGHT: 149 LBS | HEIGHT: 62 IN | TEMPERATURE: 97.2 F | DIASTOLIC BLOOD PRESSURE: 87 MMHG | SYSTOLIC BLOOD PRESSURE: 132 MMHG | OXYGEN SATURATION: 93 % | BODY MASS INDEX: 27.42 KG/M2 | HEART RATE: 73 BPM

## 2022-07-18 DIAGNOSIS — Z00.00 ROUTINE GENERAL MEDICAL EXAMINATION AT A HEALTH CARE FACILITY: Primary | ICD-10-CM

## 2022-07-18 DIAGNOSIS — Z13.0 SCREENING FOR IRON DEFICIENCY ANEMIA: ICD-10-CM

## 2022-07-18 DIAGNOSIS — Z13.220 LIPID SCREENING: ICD-10-CM

## 2022-07-18 DIAGNOSIS — Z13.1 SCREENING FOR DIABETES MELLITUS: ICD-10-CM

## 2022-07-18 DIAGNOSIS — Z13.29 SCREENING FOR THYROID DISORDER: ICD-10-CM

## 2022-07-18 DIAGNOSIS — Z12.31 VISIT FOR SCREENING MAMMOGRAM: ICD-10-CM

## 2022-07-18 DIAGNOSIS — L70.0 CYSTIC ACNE: ICD-10-CM

## 2022-07-18 LAB
ANION GAP SERPL CALCULATED.3IONS-SCNC: 5 MMOL/L (ref 3–14)
BUN SERPL-MCNC: 18 MG/DL (ref 7–30)
CALCIUM SERPL-MCNC: 9 MG/DL (ref 8.5–10.1)
CHLORIDE BLD-SCNC: 108 MMOL/L (ref 94–109)
CHOLEST SERPL-MCNC: 153 MG/DL
CO2 SERPL-SCNC: 25 MMOL/L (ref 20–32)
CREAT SERPL-MCNC: 0.74 MG/DL (ref 0.52–1.04)
ERYTHROCYTE [DISTWIDTH] IN BLOOD BY AUTOMATED COUNT: 11.4 % (ref 10–15)
FASTING STATUS PATIENT QL REPORTED: NO
GFR SERPL CREATININE-BSD FRML MDRD: >90 ML/MIN/1.73M2
GLUCOSE BLD-MCNC: 99 MG/DL (ref 70–99)
HCT VFR BLD AUTO: 40.5 % (ref 35–47)
HDLC SERPL-MCNC: 66 MG/DL
HGB BLD-MCNC: 13.7 G/DL (ref 11.7–15.7)
LDLC SERPL CALC-MCNC: 80 MG/DL
MCH RBC QN AUTO: 33.4 PG (ref 26.5–33)
MCHC RBC AUTO-ENTMCNC: 33.8 G/DL (ref 31.5–36.5)
MCV RBC AUTO: 99 FL (ref 78–100)
NONHDLC SERPL-MCNC: 87 MG/DL
PLATELET # BLD AUTO: 253 10E3/UL (ref 150–450)
POTASSIUM BLD-SCNC: 4.2 MMOL/L (ref 3.4–5.3)
RBC # BLD AUTO: 4.1 10E6/UL (ref 3.8–5.2)
SODIUM SERPL-SCNC: 138 MMOL/L (ref 133–144)
TRIGL SERPL-MCNC: 37 MG/DL
TSH SERPL DL<=0.005 MIU/L-ACNC: 2.45 MU/L (ref 0.4–4)
WBC # BLD AUTO: 5.5 10E3/UL (ref 4–11)

## 2022-07-18 PROCEDURE — 84443 ASSAY THYROID STIM HORMONE: CPT | Performed by: NURSE PRACTITIONER

## 2022-07-18 PROCEDURE — 80061 LIPID PANEL: CPT | Performed by: NURSE PRACTITIONER

## 2022-07-18 PROCEDURE — 36415 COLL VENOUS BLD VENIPUNCTURE: CPT | Performed by: NURSE PRACTITIONER

## 2022-07-18 PROCEDURE — 99396 PREV VISIT EST AGE 40-64: CPT | Performed by: NURSE PRACTITIONER

## 2022-07-18 PROCEDURE — 99213 OFFICE O/P EST LOW 20 MIN: CPT | Mod: 25 | Performed by: NURSE PRACTITIONER

## 2022-07-18 PROCEDURE — 80048 BASIC METABOLIC PNL TOTAL CA: CPT | Performed by: NURSE PRACTITIONER

## 2022-07-18 PROCEDURE — 85027 COMPLETE CBC AUTOMATED: CPT | Performed by: NURSE PRACTITIONER

## 2022-07-18 RX ORDER — SPIRONOLACTONE 50 MG/1
50 TABLET, FILM COATED ORAL DAILY
Qty: 90 TABLET | Refills: 3 | Status: SHIPPED | OUTPATIENT
Start: 2022-07-18 | End: 2023-08-02

## 2022-07-18 NOTE — PROGRESS NOTES
SUBJECTIVE:   CC: Millicent Celaya is an 42 year old woman who presents for preventive health visit.     ADDITIONAL: Feels like she doest need to do a physical       Patient has been advised of split billing requirements and indicates understanding: Yes  Healthy Habits:     Taking medications regularly:  0    PHQ-2 Total Score: 0  History of Present Illness       Reason for visit:  Need my RX refilled and I need to come in to complete this - blood work is needed    She eats 0-1 servings of fruits and vegetables daily.She consumes 0 sweetened beverage(s) daily.She exercises with enough effort to increase her heart rate 20 to 29 minutes per day.  She exercises with enough effort to increase her heart rate 3 or less days per week.   She is taking medications regularly.        PROBLEMS TO ADD ON...  Moving to [place] in a month and half.         Today's PHQ-2 Score:   PHQ-2 ( 1999 [employer]) 7/15/2022   Q1: Little interest or pleasure in doing things 0   Q2: Feeling down, depressed or hopeless 0   PHQ-2 Score 0   PHQ-2 Total Score (12-17 Years)- Positive if 3 or more points; Administer PHQ-A if positive -   Q1: Little interest or pleasure in doing things Not at all   Q2: Feeling down, depressed or hopeless Not at all   PHQ-2 Score 0       Abuse: Current or Past (Physical, Sexual or Emotional) - No  Do you feel safe in your environment? Yes    Have you ever done Advance Care Planning? (For example, a Health Directive, POLST, or a discussion with a medical provider or your loved ones about your wishes): Yes, advance care planning is on file.    Social History     Tobacco Use     Smoking status: Never Smoker     Smokeless tobacco: Never Used   Substance Use Topics     Alcohol use: Not Currently     Comment: no drink in several months      If you drink alcohol do you typically have >3 drinks per day or >7 drinks per week? No    Alcohol Use 12/12/2017   Prescreen: >3 drinks/day or >7 drinks/week? The patient does not drink >3  drinks per day nor >7 drinks per week.   No flowsheet data found.    Reviewed orders with patient.  Reviewed health maintenance and updated orders accordingly - Yes  Lab work is in process    Breast Cancer Screening:    Breast CA Risk Assessment (FHS-7) 7/18/2022   Do you have a family history of breast, colon, or ovarian cancer? No / Unknown         Mammogram Screening - Offered annual screening and updated Health Maintenance for mutual plan based on risk factor consideration    Pertinent mammograms are reviewed under the imaging tab.    History of abnormal Pap smear: NO - age 30-65 PAP every 5 years with negative HPV co-testing recommended  PAP / HPV Latest Ref Rng & Units 10/7/2020 6/1/2016   PAP (Historical) - NIL Negative   HPV16 NEG:Negative Negative -   HPV18 NEG:Negative Negative -   HRHPV NEG:Negative Positive(A) -     Reviewed and updated as needed this visit by clinical staff                    Reviewed and updated as needed this visit by Provider                       Review of Systems  CONSTITUTIONAL: NEGATIVE for fever, chills, change in weight  INTEGUMENTARU/SKIN: NEGATIVE for worrisome rashes, moles or lesions  EYES: NEGATIVE for vision changes or irritation  ENT: NEGATIVE for ear, mouth and throat problems  RESP: NEGATIVE for significant cough or SOB  BREAST: NEGATIVE for masses, tenderness or discharge  CV: NEGATIVE for chest pain, palpitations or peripheral edema  GI: NEGATIVE for nausea, abdominal pain, heartburn, or change in bowel habits  : NEGATIVE for unusual urinary or vaginal symptoms. Periods are regular.  MUSCULOSKELETAL: NEGATIVE for significant arthralgias or myalgia  NEURO: NEGATIVE for weakness, dizziness or paresthesias  PSYCHIATRIC: NEGATIVE for changes in mood or affect     OBJECTIVE:   LMP 11/19/2020 (Within Days)   Physical Exam  GENERAL: healthy, alert and no distress  EYES: Eyes grossly normal to inspection, PERRL and conjunctivae and sclerae normal  HENT: ear canals and TM's  "normal, nose and mouth without ulcers or lesions  NECK: no adenopathy, no asymmetry, masses, or scars and thyroid normal to palpation  RESP: lungs clear to auscultation - no rales, rhonchi or wheezes  CV: regular rate and rhythm, normal S1 S2, no S3 or S4, no murmur, click or rub, no peripheral edema and peripheral pulses strong  ABDOMEN: soft, nontender, no hepatosplenomegaly, no masses and bowel sounds normal  MS: no gross musculoskeletal defects noted, no edema  SKIN: no suspicious lesions or rashes  NEURO: Normal strength and tone, mentation intact and speech normal  PSYCH: mentation appears normal, affect normal/bright        ASSESSMENT/PLAN:   (Z00.00) Routine general medical examination at a health care facility  (primary encounter diagnosis)  Comment: Reviewed medical/social/family history and health maintenance  Plan:       (Z12.31) Visit for screening mammogram  Comment:   Plan: MA SCREENING DIGITAL BILAT - Future  (s+30)            (L70.0) Cystic acne  Comment: Stable, tolerating med, no changes at this time  Plan: BASIC METABOLIC PANEL, spironolactone         (ALDACTONE) 50 MG tablet            (Z13.29) Screening for thyroid disorder  Comment:   Plan: TSH with free T4 reflex            (Z13.0) Screening for iron deficiency anemia  Comment:   Plan: CBC with platelets            (Z13.1) Screening for diabetes mellitus  Comment:   Plan:     (Z13.220) Lipid screening  Comment:   Plan: Lipid panel reflex to direct LDL Non-fasting              Patient has been advised of split billing requirements and indicates understanding: Yes    COUNSELING:  Reviewed preventive health counseling, as reflected in patient instructions    Estimated body mass index is 21.56 kg/m  as calculated from the following:    Height as of 7/13/21: 1.676 m (5' 6\").    Weight as of 7/13/21: 60.6 kg (133 lb 9.6 oz).        She reports that she has never smoked. She has never used smokeless tobacco.      Counseling Resources:  ATP IV " Guidelines  Pooled Cohorts Equation Calculator  Breast Cancer Risk Calculator  BRCA-Related Cancer Risk Assessment: FHS-7 Tool  FRAX Risk Assessment  ICSI Preventive Guidelines  Dietary Guidelines for Americans, 2010  USDA's MyPlate  ASA Prophylaxis  Lung CA Screening    CHILANGO Thacker [facility]

## 2022-10-15 ENCOUNTER — HEALTH MAINTENANCE LETTER (OUTPATIENT)
Age: 42
End: 2022-10-15

## 2023-03-24 NOTE — TELEPHONE ENCOUNTER
aldactone      Last Written Prescription Date: 8-15-17  Last Fill Quantity: 60, # refills: 1  Last Office Visit with G, P or [facility] prescribing provider: 7-14-17       No results found for: POTASSIUM  No results found for: CR  BP Readings from Last 3 Encounters:   07/14/17 136/88   02/13/17 143/81   01/16/17 131/74     Message left on answering machine asking patient to call triage.   1. Is she on Aldactone QD or BID?  Is she seeing improvement?  2. If not, see last OV note for derm referral ph #   [facility]

## 2023-08-02 DIAGNOSIS — L70.0 CYSTIC ACNE: ICD-10-CM

## 2023-08-04 RX ORDER — SPIRONOLACTONE 50 MG/1
50 TABLET, FILM COATED ORAL DAILY
Qty: 90 TABLET | Refills: 0 | Status: SHIPPED | OUTPATIENT
Start: 2023-08-04

## 2023-08-04 NOTE — TELEPHONE ENCOUNTER
Medication is being filled for 1 time refill only due to:  Patient needs labs Potassium. Creatinine. Sodium. Patient needs to be seen because it has been more than one year since last visit.    Last Written Prescription Date:  7/18/2022  Last Fill Quantity: 90,  # refills: 3   Last office visit: 7/18/2022 with JENNIFER Neves. 12/16/2020 with Dr. Padron. Last virtual visit: 3/1/2021 with prescribing provider:  Dr. Padron   Future Office Visit:  none    Scheduling:   Please contact patient to schedule an annual preventative. Thank you.     Gladis Bone, BSN RN  [facility]

## 2023-08-04 NOTE — TELEPHONE ENCOUNTER
Patient moved to [place] a year ago so will take Charlie Merida off as PCP. Patient sees a provider in [place]. Advised for next refill to call providers office for refill so [facility] has the current provider.

## 2023-08-20 ENCOUNTER — HEALTH MAINTENANCE LETTER (OUTPATIENT)
Age: 43
End: 2023-08-20

## 2023-10-29 ENCOUNTER — HEALTH MAINTENANCE LETTER (OUTPATIENT)
Age: 43
End: 2023-10-29

## 2024-10-13 ENCOUNTER — HEALTH MAINTENANCE LETTER (OUTPATIENT)
Age: 44
End: 2024-10-13

## 2024-12-21 ENCOUNTER — HEALTH MAINTENANCE LETTER (OUTPATIENT)
Age: 44
End: 2024-12-21

## (undated) DEVICE — PAD PERI INDIV WRAP 11" 2022A

## (undated) DEVICE — SUCTION TIP YANKAUER W/O VENT K86

## (undated) DEVICE — GOWN XLG DISP 9545

## (undated) DEVICE — ESU FCP OLYMPUS PK CUTTING 5MMX33CM PK-CF0533

## (undated) DEVICE — APPLICATORS COTTON TIP 6"X2 STERILE LF C15053-006

## (undated) DEVICE — GLOVE PROTEXIS BLUE W/NEU-THERA 6.5  2D73EB65

## (undated) DEVICE — TUBING SMOKE EVAC PNEUVIEW 9660-XE

## (undated) DEVICE — PANTIES MESH LG/XLG 2PK 706M2

## (undated) DEVICE — GLOVE PROTEXIS W/NEU-THERA 6.5  2D73TE65

## (undated) DEVICE — SUCTION MANIFOLD DORNOCH ULTRA CART UL-CL500

## (undated) DEVICE — ENDO TROCAR FIRST ENTRY KII FIOS ADV FIX 05X100MM CFF03

## (undated) DEVICE — ESU GROUND PAD UNIVERSAL W/O CORD

## (undated) DEVICE — SPONGE RAY-TEC 4X8" 7318

## (undated) DEVICE — ENDO TROCAR SLEEVE KII ADV FIXATION 05X100MM CFS02

## (undated) DEVICE — SOL WATER IRRIG 1000ML BOTTLE 2F7114

## (undated) DEVICE — KIT POSITIONER PINK PAD XL ADVANCED 40588

## (undated) DEVICE — CATH TRAY FOLEY SURESTEP 16FR WDRAIN BAG STLK LATEX A300316A

## (undated) DEVICE — GLOVE PROTEXIS BLUE W/NEU-THERA 7.0  2D73EB70

## (undated) DEVICE — TUBING C02 INSUFFLATION LAP FILTER HEATER 6198

## (undated) DEVICE — GLOVE PROTEXIS W/NEU-THERA 6.0  2D73TE60

## (undated) DEVICE — LINEN GOWN X4 5410

## (undated) DEVICE — PACK SET-UP STD 9102

## (undated) DEVICE — TUBING SUCTION MEDI-VAC 1/4"X20' N620A

## (undated) DEVICE — ESU HOLDER LAP INST DISP PURPLE LONG 330MM H-PRO-330

## (undated) DEVICE — LINEN TOWEL PACK X5 5464

## (undated) DEVICE — Device

## (undated) DEVICE — SOL NACL 0.9% IRRIG 1000ML BOTTLE 2F7124

## (undated) DEVICE — SUCTION IRR STRYKERFLOW II W/TIP 250-070-520

## (undated) DEVICE — TUBING IRRIG CYSTO/BLADDER SET 81" LF 2C4040

## (undated) DEVICE — SOL NACL 0.9% INJ 1000ML BAG 2B1324X

## (undated) DEVICE — SU VICRYL 0 CT-2 27" J334H

## (undated) DEVICE — SU VICRYL 0 CT-1 36" J346H

## (undated) DEVICE — PAD CHUX UNDERPAD 30X36" P3036C

## (undated) DEVICE — BARRIER INTERCEED 3X4" 4350

## (undated) DEVICE — ADH SKIN CLOSURE PREMIERPRO EXOFIN 1.0ML 3470

## (undated) DEVICE — RETR ELEV / UTERINE MANIPULATOR V-CARE MED CUP 60-6085-201A

## (undated) DEVICE — ESU HANDPIECE OLYMPUS PK SPATULA PK-SP0533

## (undated) RX ORDER — LIDOCAINE HYDROCHLORIDE 20 MG/ML
INJECTION, SOLUTION EPIDURAL; INFILTRATION; INTRACAUDAL; PERINEURAL
Status: DISPENSED
Start: 2020-12-04

## (undated) RX ORDER — ACETAMINOPHEN 325 MG/1
TABLET ORAL
Status: DISPENSED
Start: 2020-12-04

## (undated) RX ORDER — HYDROMORPHONE HYDROCHLORIDE 1 MG/ML
INJECTION, SOLUTION INTRAMUSCULAR; INTRAVENOUS; SUBCUTANEOUS
Status: DISPENSED
Start: 2020-12-04

## (undated) RX ORDER — ONDANSETRON 2 MG/ML
INJECTION INTRAMUSCULAR; INTRAVENOUS
Status: DISPENSED
Start: 2020-12-04

## (undated) RX ORDER — PROPOFOL 10 MG/ML
INJECTION, EMULSION INTRAVENOUS
Status: DISPENSED
Start: 2020-12-04

## (undated) RX ORDER — GLYCOPYRROLATE 0.2 MG/ML
INJECTION, SOLUTION INTRAMUSCULAR; INTRAVENOUS
Status: DISPENSED
Start: 2020-12-04

## (undated) RX ORDER — EPHEDRINE SULFATE 50 MG/ML
INJECTION, SOLUTION INTRAMUSCULAR; INTRAVENOUS; SUBCUTANEOUS
Status: DISPENSED
Start: 2020-12-04

## (undated) RX ORDER — CEFAZOLIN SODIUM 1 G/3ML
INJECTION, POWDER, FOR SOLUTION INTRAMUSCULAR; INTRAVENOUS
Status: DISPENSED
Start: 2020-12-04

## (undated) RX ORDER — PHENAZOPYRIDINE HYDROCHLORIDE 200 MG/1
TABLET, FILM COATED ORAL
Status: DISPENSED
Start: 2020-12-04

## (undated) RX ORDER — FENTANYL CITRATE 50 UG/ML
INJECTION, SOLUTION INTRAMUSCULAR; INTRAVENOUS
Status: DISPENSED
Start: 2020-12-04

## (undated) RX ORDER — FENTANYL CITRATE-0.9 % NACL/PF 10 MCG/ML
PLASTIC BAG, INJECTION (ML) INTRAVENOUS
Status: DISPENSED
Start: 2020-12-04

## (undated) RX ORDER — MEPERIDINE HYDROCHLORIDE 25 MG/ML
INJECTION INTRAMUSCULAR; INTRAVENOUS; SUBCUTANEOUS
Status: DISPENSED
Start: 2020-12-04

## (undated) RX ORDER — OXYCODONE HYDROCHLORIDE 5 MG/1
TABLET ORAL
Status: DISPENSED
Start: 2020-12-04

## (undated) RX ORDER — DEXAMETHASONE SODIUM PHOSPHATE 4 MG/ML
INJECTION, SOLUTION INTRA-ARTICULAR; INTRALESIONAL; INTRAMUSCULAR; INTRAVENOUS; SOFT TISSUE
Status: DISPENSED
Start: 2020-12-04